# Patient Record
Sex: MALE | Race: BLACK OR AFRICAN AMERICAN | NOT HISPANIC OR LATINO
[De-identification: names, ages, dates, MRNs, and addresses within clinical notes are randomized per-mention and may not be internally consistent; named-entity substitution may affect disease eponyms.]

---

## 2018-01-03 PROBLEM — Z00.00 ENCOUNTER FOR PREVENTIVE HEALTH EXAMINATION: Status: ACTIVE | Noted: 2018-01-03

## 2019-06-24 ENCOUNTER — RX RENEWAL (OUTPATIENT)
Age: 54
End: 2019-06-24

## 2019-06-24 RX ORDER — PEN NEEDLE, DIABETIC 29 G X1/2"
32G X 4 MM NEEDLE, DISPOSABLE MISCELLANEOUS
Qty: 3 | Refills: 1 | Status: ACTIVE | COMMUNITY
Start: 2019-06-24 | End: 1900-01-01

## 2019-06-26 ENCOUNTER — RX RENEWAL (OUTPATIENT)
Age: 54
End: 2019-06-26

## 2019-06-27 ENCOUNTER — MEDICATION RENEWAL (OUTPATIENT)
Age: 54
End: 2019-06-27

## 2019-07-10 ENCOUNTER — RX RENEWAL (OUTPATIENT)
Age: 54
End: 2019-07-10

## 2019-07-11 ENCOUNTER — APPOINTMENT (OUTPATIENT)
Dept: ENDOCRINOLOGY | Facility: CLINIC | Age: 54
End: 2019-07-11
Payer: MEDICAID

## 2019-07-11 VITALS
OXYGEN SATURATION: 96 % | HEART RATE: 96 BPM | BODY MASS INDEX: 39.17 KG/M2 | SYSTOLIC BLOOD PRESSURE: 120 MMHG | WEIGHT: 315 LBS | HEIGHT: 75 IN | DIASTOLIC BLOOD PRESSURE: 80 MMHG | TEMPERATURE: 98.6 F

## 2019-07-11 DIAGNOSIS — Z56.0 UNEMPLOYMENT, UNSPECIFIED: ICD-10-CM

## 2019-07-11 DIAGNOSIS — E66.9 OBESITY, UNSPECIFIED: ICD-10-CM

## 2019-07-11 DIAGNOSIS — Z78.9 OTHER SPECIFIED HEALTH STATUS: ICD-10-CM

## 2019-07-11 DIAGNOSIS — E11.65 TYPE 2 DIABETES MELLITUS WITH HYPERGLYCEMIA: ICD-10-CM

## 2019-07-11 DIAGNOSIS — N52.9 MALE ERECTILE DYSFUNCTION, UNSPECIFIED: ICD-10-CM

## 2019-07-11 DIAGNOSIS — R79.89 OTHER SPECIFIED ABNORMAL FINDINGS OF BLOOD CHEMISTRY: ICD-10-CM

## 2019-07-11 DIAGNOSIS — L97.529 NON-PRESSURE CHRONIC ULCER OF OTHER PART OF LEFT FOOT WITH UNSPECIFIED SEVERITY: ICD-10-CM

## 2019-07-11 DIAGNOSIS — E55.9 VITAMIN D DEFICIENCY, UNSPECIFIED: ICD-10-CM

## 2019-07-11 PROCEDURE — 83036 HEMOGLOBIN GLYCOSYLATED A1C: CPT | Mod: QW

## 2019-07-11 PROCEDURE — 36415 COLL VENOUS BLD VENIPUNCTURE: CPT

## 2019-07-11 PROCEDURE — 82962 GLUCOSE BLOOD TEST: CPT

## 2019-07-11 PROCEDURE — 99215 OFFICE O/P EST HI 40 MIN: CPT | Mod: 25

## 2019-07-11 RX ORDER — SITAGLIPTIN 100 MG/1
100 TABLET, FILM COATED ORAL
Refills: 0 | Status: ACTIVE | COMMUNITY

## 2019-07-11 RX ORDER — REPAGLINIDE 2 MG/1
2 TABLET ORAL
Qty: 360 | Refills: 0 | Status: ACTIVE | COMMUNITY
Start: 2019-06-26 | End: 1900-01-01

## 2019-07-11 SDOH — ECONOMIC STABILITY - INCOME SECURITY: UNEMPLOYMENT, UNSPECIFIED: Z56.0

## 2019-07-11 NOTE — HISTORY OF PRESENT ILLNESS
[FreeTextEntry1] : Mr. Mead  returns today in follow up with regard to a history of type 2 diabetes mellitus.  Diabetes has been present for eight years . There is no known history of retinopathy, nephropathy. He too denies any history of neuropathy to fingers and feet. .  Last Hba1c returned at 9.6 % and  POCT bg 263 mg/dl .  Current dm medication include  Januvia 100 mg po one tablet daily , Repaglinide 2 mg po daily  two tablet twice  a day in am and prior to dinner and  glimepiride 2 mg  two tablets twice  a day , basaglar 10 units-had stopped for some time twice  a day  .  HGM of late has not been tested.*/ . There was one episode this morning of hyperglycemia , patient felt light headed . Patient had laser eye surgery  three week ago. He also had left toe ulcer since fall last year. In March x ray  done  and was negative for osteomyelitis. He denies any chest pain, sob, or ophthalmologic complaints. He too . He is up to date with his ophthalmologic visit.\par He is willing to try kelly.\par \par laser surgery  three week ago. Patient had left toe ulcer  since fall last year. In March x ray  done  and was negative for osteomyelitis.

## 2019-07-11 NOTE — PHYSICAL EXAM
[Alert] : alert [No Acute Distress] : no acute distress [Well Nourished] : well nourished [Well Developed] : well developed [Normal Sclera/Conjunctiva] : normal sclera/conjunctiva [EOMI] : extra ocular movement intact [No Proptosis] : no proptosis [Normal Oropharynx] : the oropharynx was normal [No Thyroid Nodules] : there were no palpable thyroid nodules [Thyroid Not Enlarged] : the thyroid was not enlarged [No Accessory Muscle Use] : no accessory muscle use [No Respiratory Distress] : no respiratory distress [Clear to Auscultation] : lungs were clear to auscultation bilaterally [Normal Rate] : heart rate was normal  [Normal S1, S2] : normal S1 and S2 [Regular Rhythm] : with a regular rhythm [Pedal Pulses Normal] : the pedal pulses are present [Normal Bowel Sounds] : normal bowel sounds [No Edema] : there was no peripheral edema [Soft] : abdomen soft [Not Tender] : non-tender [Not Distended] : not distended [Post Cervical Nodes] : posterior cervical nodes [Anterior Cervical Nodes] : anterior cervical nodes [Axillary Nodes] : axillary nodes [Spine Straight] : spine straight [Normal] : normal and non tender [No Spinal Tenderness] : no spinal tenderness [No Stigmata of Cushings Syndrome] : no stigmata of cushings syndrome [Normal Strength/Tone] : muscle strength and tone were normal [No Rash] : no rash [Normal Gait] : normal gait [Normal Reflexes] : deep tendon reflexes were 2+ and symmetric [No Tremors] : no tremors [Oriented x3] : oriented to person, place, and time [Acanthosis Nigricans] : no acanthosis nigricans

## 2019-07-16 LAB
GLUCOSE BLDC GLUCOMTR-MCNC: 263
HBA1C MFR BLD HPLC: 9.6

## 2019-07-20 RX ORDER — LANCETS
EACH MISCELLANEOUS
Qty: 4 | Refills: 3 | Status: ACTIVE | COMMUNITY
Start: 2019-07-20 | End: 1900-01-01

## 2019-08-14 ENCOUNTER — APPOINTMENT (OUTPATIENT)
Dept: ENDOCRINOLOGY | Facility: CLINIC | Age: 54
End: 2019-08-14

## 2019-08-15 LAB
25(OH)D3 SERPL-MCNC: 25.3 NG/ML
ALBUMIN SERPL ELPH-MCNC: 3.1 G/DL
ALP BLD-CCNC: 87 U/L
ALT SERPL-CCNC: 15 U/L
ANION GAP SERPL CALC-SCNC: 11 MMOL/L
AST SERPL-CCNC: 17 U/L
BASOPHILS # BLD AUTO: 0.04 K/UL
BASOPHILS NFR BLD AUTO: 0.4 %
BILIRUB SERPL-MCNC: 0.3 MG/DL
BUN SERPL-MCNC: 26 MG/DL
CALCIUM SERPL-MCNC: 9.1 MG/DL
CHLORIDE SERPL-SCNC: 103 MMOL/L
CO2 SERPL-SCNC: 24 MMOL/L
CREAT SERPL-MCNC: 1.85 MG/DL
CREAT SPEC-SCNC: 214 MG/DL
EOSINOPHIL # BLD AUTO: 0.14 K/UL
EOSINOPHIL NFR BLD AUTO: 1.5 %
ESTIMATED AVERAGE GLUCOSE: 209 MG/DL
FRUCTOSAMINE SERPL-MCNC: 306 UMOL/L
GLUCOSE SERPL-MCNC: 302 MG/DL
GLYCOMARK.: 2.9 UG/ML
HBA1C MFR BLD HPLC: 8.9 %
HCT VFR BLD CALC: 25.5 %
HDLC SERPL-MCNC: 36 MG/DL
HGB BLD-MCNC: 8.5 G/DL
IMM GRANULOCYTES NFR BLD AUTO: 0.3 %
LDLC SERPL DIRECT ASSAY-MCNC: 97 MG/DL
LYMPHOCYTES # BLD AUTO: 1.86 K/UL
LYMPHOCYTES NFR BLD AUTO: 19.4 %
MAN DIFF?: NORMAL
MCHC RBC-ENTMCNC: 26.7 PG
MCHC RBC-ENTMCNC: 33.3 GM/DL
MCV RBC AUTO: 80.2 FL
MICROALBUMIN 24H UR DL<=1MG/L-MCNC: 45.1 MG/DL
MICROALBUMIN/CREAT 24H UR-RTO: 211 MG/G
MONOCYTES # BLD AUTO: 0.48 K/UL
MONOCYTES NFR BLD AUTO: 5 %
NEUTROPHILS # BLD AUTO: 7.05 K/UL
NEUTROPHILS NFR BLD AUTO: 73.4 %
PLATELET # BLD AUTO: 250 K/UL
POTASSIUM SERPL-SCNC: 4.3 MMOL/L
PROT SERPL-MCNC: 8.3 G/DL
PSA FREE FLD-MCNC: 17 %
PSA FREE SERPL-MCNC: 0.43 NG/ML
PSA SERPL-MCNC: 2.54 NG/ML
RBC # BLD: 3.18 M/UL
RBC # FLD: 14.3 %
SODIUM SERPL-SCNC: 138 MMOL/L
T4 FREE SERPL-MCNC: 1 NG/DL
TESTOST BND SERPL-MCNC: 5 PG/ML
TESTOST SERPL-MCNC: 296.6 NG/DL
TRIGL SERPL-MCNC: 63 MG/DL
TSH SERPL-ACNC: 2.75 UIU/ML
WBC # FLD AUTO: 9.6 K/UL

## 2019-08-19 ENCOUNTER — RX RENEWAL (OUTPATIENT)
Age: 54
End: 2019-08-19

## 2019-11-29 ENCOUNTER — RX RENEWAL (OUTPATIENT)
Age: 54
End: 2019-11-29

## 2019-11-29 RX ORDER — INSULIN GLARGINE 100 [IU]/ML
100 INJECTION, SOLUTION SUBCUTANEOUS
Qty: 2 | Refills: 3 | Status: ACTIVE | COMMUNITY
Start: 2019-11-29 | End: 1900-01-01

## 2019-12-24 ENCOUNTER — RX RENEWAL (OUTPATIENT)
Age: 54
End: 2019-12-24

## 2019-12-24 RX ORDER — ERGOCALCIFEROL 1.25 MG/1
1.25 MG CAPSULE, LIQUID FILLED ORAL
Qty: 12 | Refills: 1 | Status: ACTIVE | COMMUNITY
Start: 2019-08-19 | End: 1900-01-01

## 2020-02-26 ENCOUNTER — RX RENEWAL (OUTPATIENT)
Age: 55
End: 2020-02-26

## 2020-02-26 RX ORDER — GLIMEPIRIDE 2 MG/1
2 TABLET ORAL
Qty: 360 | Refills: 1 | Status: ACTIVE | COMMUNITY
Start: 2020-02-26 | End: 1900-01-01

## 2020-05-08 ENCOUNTER — EMERGENCY (EMERGENCY)
Facility: HOSPITAL | Age: 55
LOS: 1 days | Discharge: HOME | End: 2020-05-08
Attending: EMERGENCY MEDICINE
Payer: SELF-PAY

## 2020-05-08 VITALS
DIASTOLIC BLOOD PRESSURE: 87 MMHG | OXYGEN SATURATION: 98 % | SYSTOLIC BLOOD PRESSURE: 172 MMHG | RESPIRATION RATE: 20 BRPM | HEART RATE: 87 BPM

## 2020-05-08 VITALS
DIASTOLIC BLOOD PRESSURE: 76 MMHG | SYSTOLIC BLOOD PRESSURE: 186 MMHG | HEART RATE: 83 BPM | OXYGEN SATURATION: 100 % | TEMPERATURE: 98 F | RESPIRATION RATE: 19 BRPM

## 2020-05-08 LAB
ALBUMIN SERPL ELPH-MCNC: 2.9 G/DL — LOW (ref 3.3–5)
ALBUMIN SERPL ELPH-MCNC: 2.9 G/DL — LOW (ref 3.3–5)
ALP SERPL-CCNC: 82 U/L — SIGNIFICANT CHANGE UP (ref 40–120)
ALP SERPL-CCNC: 83 U/L — SIGNIFICANT CHANGE UP (ref 40–120)
ALT FLD-CCNC: 11 U/L — SIGNIFICANT CHANGE UP (ref 10–45)
ALT FLD-CCNC: 11 U/L — SIGNIFICANT CHANGE UP (ref 10–45)
ANION GAP SERPL CALC-SCNC: 10 MMOL/L — SIGNIFICANT CHANGE UP (ref 5–17)
ANION GAP SERPL CALC-SCNC: 13 MMOL/L — SIGNIFICANT CHANGE UP (ref 5–17)
APPEARANCE UR: CLEAR — SIGNIFICANT CHANGE UP
APTT BLD: 32.1 SEC — SIGNIFICANT CHANGE UP (ref 27.5–36.3)
AST SERPL-CCNC: 16 U/L — SIGNIFICANT CHANGE UP (ref 10–40)
AST SERPL-CCNC: 18 U/L — SIGNIFICANT CHANGE UP (ref 10–40)
BASOPHILS # BLD AUTO: 0.02 K/UL — SIGNIFICANT CHANGE UP (ref 0–0.2)
BASOPHILS # BLD AUTO: 0.03 K/UL — SIGNIFICANT CHANGE UP (ref 0–0.2)
BASOPHILS NFR BLD AUTO: 0.2 % — SIGNIFICANT CHANGE UP (ref 0–2)
BASOPHILS NFR BLD AUTO: 0.3 % — SIGNIFICANT CHANGE UP (ref 0–2)
BILIRUB SERPL-MCNC: 0.3 MG/DL — SIGNIFICANT CHANGE UP (ref 0.2–1.2)
BILIRUB SERPL-MCNC: 0.3 MG/DL — SIGNIFICANT CHANGE UP (ref 0.2–1.2)
BILIRUB UR-MCNC: NEGATIVE — SIGNIFICANT CHANGE UP
BLD GP AB SCN SERPL QL: NEGATIVE — SIGNIFICANT CHANGE UP
BUN SERPL-MCNC: 21 MG/DL — SIGNIFICANT CHANGE UP (ref 7–23)
BUN SERPL-MCNC: 23 MG/DL — SIGNIFICANT CHANGE UP (ref 7–23)
CALCIUM SERPL-MCNC: 9 MG/DL — SIGNIFICANT CHANGE UP (ref 8.4–10.5)
CALCIUM SERPL-MCNC: 9.2 MG/DL — SIGNIFICANT CHANGE UP (ref 8.4–10.5)
CHLORIDE SERPL-SCNC: 102 MMOL/L — SIGNIFICANT CHANGE UP (ref 96–108)
CHLORIDE SERPL-SCNC: 103 MMOL/L — SIGNIFICANT CHANGE UP (ref 96–108)
CO2 SERPL-SCNC: 23 MMOL/L — SIGNIFICANT CHANGE UP (ref 22–31)
CO2 SERPL-SCNC: 23 MMOL/L — SIGNIFICANT CHANGE UP (ref 22–31)
COLOR SPEC: YELLOW — SIGNIFICANT CHANGE UP
CREAT SERPL-MCNC: 1.3 MG/DL — SIGNIFICANT CHANGE UP (ref 0.5–1.3)
CREAT SERPL-MCNC: 1.49 MG/DL — HIGH (ref 0.5–1.3)
DIFF PNL FLD: ABNORMAL
EOSINOPHIL # BLD AUTO: 0.11 K/UL — SIGNIFICANT CHANGE UP (ref 0–0.5)
EOSINOPHIL # BLD AUTO: 0.33 K/UL — SIGNIFICANT CHANGE UP (ref 0–0.5)
EOSINOPHIL NFR BLD AUTO: 1 % — SIGNIFICANT CHANGE UP (ref 0–6)
EOSINOPHIL NFR BLD AUTO: 3.3 % — SIGNIFICANT CHANGE UP (ref 0–6)
GLUCOSE SERPL-MCNC: 138 MG/DL — HIGH (ref 70–99)
GLUCOSE SERPL-MCNC: 177 MG/DL — HIGH (ref 70–99)
GLUCOSE UR QL: NEGATIVE — SIGNIFICANT CHANGE UP
HCT VFR BLD CALC: 24.2 % — LOW (ref 39–50)
HCT VFR BLD CALC: 24.3 % — LOW (ref 39–50)
HGB BLD-MCNC: 8 G/DL — LOW (ref 13–17)
HGB BLD-MCNC: 8.1 G/DL — LOW (ref 13–17)
IMM GRANULOCYTES NFR BLD AUTO: 0.4 % — SIGNIFICANT CHANGE UP (ref 0–1.5)
IMM GRANULOCYTES NFR BLD AUTO: 0.5 % — SIGNIFICANT CHANGE UP (ref 0–1.5)
INR BLD: 1.2 RATIO — HIGH (ref 0.88–1.16)
KETONES UR-MCNC: NEGATIVE — SIGNIFICANT CHANGE UP
LEUKOCYTE ESTERASE UR-ACNC: ABNORMAL
LYMPHOCYTES # BLD AUTO: 1.47 K/UL — SIGNIFICANT CHANGE UP (ref 1–3.3)
LYMPHOCYTES # BLD AUTO: 13.8 % — SIGNIFICANT CHANGE UP (ref 13–44)
LYMPHOCYTES # BLD AUTO: 3.71 K/UL — HIGH (ref 1–3.3)
LYMPHOCYTES # BLD AUTO: 37 % — SIGNIFICANT CHANGE UP (ref 13–44)
MCHC RBC-ENTMCNC: 25.4 PG — LOW (ref 27–34)
MCHC RBC-ENTMCNC: 25.9 PG — LOW (ref 27–34)
MCHC RBC-ENTMCNC: 33.1 GM/DL — SIGNIFICANT CHANGE UP (ref 32–36)
MCHC RBC-ENTMCNC: 33.3 GM/DL — SIGNIFICANT CHANGE UP (ref 32–36)
MCV RBC AUTO: 76.8 FL — LOW (ref 80–100)
MCV RBC AUTO: 77.6 FL — LOW (ref 80–100)
MONOCYTES # BLD AUTO: 0.54 K/UL — SIGNIFICANT CHANGE UP (ref 0–0.9)
MONOCYTES # BLD AUTO: 0.62 K/UL — SIGNIFICANT CHANGE UP (ref 0–0.9)
MONOCYTES NFR BLD AUTO: 5.1 % — SIGNIFICANT CHANGE UP (ref 2–14)
MONOCYTES NFR BLD AUTO: 6.2 % — SIGNIFICANT CHANGE UP (ref 2–14)
NEUTROPHILS # BLD AUTO: 5.28 K/UL — SIGNIFICANT CHANGE UP (ref 1.8–7.4)
NEUTROPHILS # BLD AUTO: 8.47 K/UL — HIGH (ref 1.8–7.4)
NEUTROPHILS NFR BLD AUTO: 52.7 % — SIGNIFICANT CHANGE UP (ref 43–77)
NEUTROPHILS NFR BLD AUTO: 79.5 % — HIGH (ref 43–77)
NITRITE UR-MCNC: NEGATIVE — SIGNIFICANT CHANGE UP
NRBC # BLD: 0 /100 WBCS — SIGNIFICANT CHANGE UP (ref 0–0)
NRBC # BLD: 0 /100 WBCS — SIGNIFICANT CHANGE UP (ref 0–0)
PH UR: 6.5 — SIGNIFICANT CHANGE UP (ref 5–8)
PLATELET # BLD AUTO: 257 K/UL — SIGNIFICANT CHANGE UP (ref 150–400)
PLATELET # BLD AUTO: 259 K/UL — SIGNIFICANT CHANGE UP (ref 150–400)
POTASSIUM SERPL-MCNC: 3.5 MMOL/L — SIGNIFICANT CHANGE UP (ref 3.5–5.3)
POTASSIUM SERPL-MCNC: 3.6 MMOL/L — SIGNIFICANT CHANGE UP (ref 3.5–5.3)
POTASSIUM SERPL-SCNC: 3.5 MMOL/L — SIGNIFICANT CHANGE UP (ref 3.5–5.3)
POTASSIUM SERPL-SCNC: 3.6 MMOL/L — SIGNIFICANT CHANGE UP (ref 3.5–5.3)
PROT SERPL-MCNC: 8.7 G/DL — HIGH (ref 6–8.3)
PROT SERPL-MCNC: 8.9 G/DL — HIGH (ref 6–8.3)
PROT UR-MCNC: 100 — SIGNIFICANT CHANGE UP
PROTHROM AB SERPL-ACNC: 13.8 SEC — HIGH (ref 10–12.9)
RBC # BLD: 3.13 M/UL — LOW (ref 4.2–5.8)
RBC # BLD: 3.15 M/UL — LOW (ref 4.2–5.8)
RBC # FLD: 15.4 % — HIGH (ref 10.3–14.5)
RBC # FLD: 15.7 % — HIGH (ref 10.3–14.5)
RH IG SCN BLD-IMP: NEGATIVE — SIGNIFICANT CHANGE UP
SARS-COV-2 RNA SPEC QL NAA+PROBE: SIGNIFICANT CHANGE UP
SARS-COV-2 RNA SPEC QL NAA+PROBE: SIGNIFICANT CHANGE UP
SODIUM SERPL-SCNC: 135 MMOL/L — SIGNIFICANT CHANGE UP (ref 135–145)
SODIUM SERPL-SCNC: 139 MMOL/L — SIGNIFICANT CHANGE UP (ref 135–145)
SP GR SPEC: 1.01 — SIGNIFICANT CHANGE UP (ref 1.01–1.02)
UROBILINOGEN FLD QL: NEGATIVE — SIGNIFICANT CHANGE UP
WBC # BLD: 10.02 K/UL — SIGNIFICANT CHANGE UP (ref 3.8–10.5)
WBC # BLD: 10.65 K/UL — HIGH (ref 3.8–10.5)
WBC # FLD AUTO: 10.02 K/UL — SIGNIFICANT CHANGE UP (ref 3.8–10.5)
WBC # FLD AUTO: 10.65 K/UL — HIGH (ref 3.8–10.5)

## 2020-05-08 PROCEDURE — 93010 ELECTROCARDIOGRAM REPORT: CPT

## 2020-05-08 PROCEDURE — 72125 CT NECK SPINE W/O DYE: CPT | Mod: 26

## 2020-05-08 PROCEDURE — 72141 MRI NECK SPINE W/O DYE: CPT | Mod: 26

## 2020-05-08 PROCEDURE — 70450 CT HEAD/BRAIN W/O DYE: CPT | Mod: 26

## 2020-05-08 PROCEDURE — 71260 CT THORAX DX C+: CPT | Mod: 26

## 2020-05-08 PROCEDURE — 70551 MRI BRAIN STEM W/O DYE: CPT | Mod: 26

## 2020-05-08 PROCEDURE — 99218: CPT

## 2020-05-08 PROCEDURE — 74177 CT ABD & PELVIS W/CONTRAST: CPT | Mod: 26

## 2020-05-08 PROCEDURE — 70498 CT ANGIOGRAPHY NECK: CPT | Mod: 26

## 2020-05-08 PROCEDURE — 70549 MR ANGIOGRAPH NECK W/O&W/DYE: CPT | Mod: 26

## 2020-05-08 PROCEDURE — 70544 MR ANGIOGRAPHY HEAD W/O DYE: CPT | Mod: 26,59

## 2020-05-08 RX ORDER — GLUCAGON INJECTION, SOLUTION 0.5 MG/.1ML
1 INJECTION, SOLUTION SUBCUTANEOUS ONCE
Refills: 0 | Status: DISCONTINUED | OUTPATIENT
Start: 2020-05-08 | End: 2020-05-12

## 2020-05-08 RX ORDER — DEXTROSE 50 % IN WATER 50 %
12.5 SYRINGE (ML) INTRAVENOUS ONCE
Refills: 0 | Status: DISCONTINUED | OUTPATIENT
Start: 2020-05-08 | End: 2020-05-12

## 2020-05-08 RX ORDER — CEFTRIAXONE 500 MG/1
1000 INJECTION, POWDER, FOR SOLUTION INTRAMUSCULAR; INTRAVENOUS ONCE
Refills: 0 | Status: COMPLETED | OUTPATIENT
Start: 2020-05-08 | End: 2020-05-08

## 2020-05-08 RX ORDER — INSULIN LISPRO 100/ML
VIAL (ML) SUBCUTANEOUS
Refills: 0 | Status: DISCONTINUED | OUTPATIENT
Start: 2020-05-08 | End: 2020-05-12

## 2020-05-08 RX ORDER — LABETALOL HCL 100 MG
10 TABLET ORAL ONCE
Refills: 0 | Status: COMPLETED | OUTPATIENT
Start: 2020-05-08 | End: 2020-05-08

## 2020-05-08 RX ORDER — DEXTROSE 50 % IN WATER 50 %
15 SYRINGE (ML) INTRAVENOUS ONCE
Refills: 0 | Status: DISCONTINUED | OUTPATIENT
Start: 2020-05-08 | End: 2020-05-12

## 2020-05-08 RX ORDER — INSULIN GLARGINE 100 [IU]/ML
15 INJECTION, SOLUTION SUBCUTANEOUS AT BEDTIME
Refills: 0 | Status: DISCONTINUED | OUTPATIENT
Start: 2020-05-08 | End: 2020-05-08

## 2020-05-08 RX ORDER — DEXTROSE 50 % IN WATER 50 %
25 SYRINGE (ML) INTRAVENOUS ONCE
Refills: 0 | Status: DISCONTINUED | OUTPATIENT
Start: 2020-05-08 | End: 2020-05-12

## 2020-05-08 RX ORDER — ACETAMINOPHEN 500 MG
1000 TABLET ORAL ONCE
Refills: 0 | Status: COMPLETED | OUTPATIENT
Start: 2020-05-08 | End: 2020-05-08

## 2020-05-08 RX ORDER — CEPHALEXIN 500 MG
1 CAPSULE ORAL
Qty: 14 | Refills: 0
Start: 2020-05-08 | End: 2020-05-14

## 2020-05-08 RX ORDER — INSULIN LISPRO 100/ML
VIAL (ML) SUBCUTANEOUS AT BEDTIME
Refills: 0 | Status: DISCONTINUED | OUTPATIENT
Start: 2020-05-08 | End: 2020-05-12

## 2020-05-08 RX ORDER — ACETAMINOPHEN 500 MG
975 TABLET ORAL EVERY 6 HOURS
Refills: 0 | Status: DISCONTINUED | OUTPATIENT
Start: 2020-05-08 | End: 2020-05-12

## 2020-05-08 RX ORDER — SODIUM CHLORIDE 9 MG/ML
1000 INJECTION INTRAMUSCULAR; INTRAVENOUS; SUBCUTANEOUS ONCE
Refills: 0 | Status: COMPLETED | OUTPATIENT
Start: 2020-05-08 | End: 2020-05-08

## 2020-05-08 RX ORDER — ACETAMINOPHEN 500 MG
975 TABLET ORAL ONCE
Refills: 0 | Status: DISCONTINUED | OUTPATIENT
Start: 2020-05-08 | End: 2020-05-08

## 2020-05-08 RX ORDER — SODIUM CHLORIDE 9 MG/ML
1000 INJECTION, SOLUTION INTRAVENOUS
Refills: 0 | Status: DISCONTINUED | OUTPATIENT
Start: 2020-05-08 | End: 2020-05-12

## 2020-05-08 RX ORDER — TETANUS TOXOID, REDUCED DIPHTHERIA TOXOID AND ACELLULAR PERTUSSIS VACCINE, ADSORBED 5; 2.5; 8; 8; 2.5 [IU]/.5ML; [IU]/.5ML; UG/.5ML; UG/.5ML; UG/.5ML
0.5 SUSPENSION INTRAMUSCULAR ONCE
Refills: 0 | Status: COMPLETED | OUTPATIENT
Start: 2020-05-08 | End: 2020-05-08

## 2020-05-08 RX ORDER — SODIUM CHLORIDE 9 MG/ML
3 INJECTION INTRAMUSCULAR; INTRAVENOUS; SUBCUTANEOUS EVERY 8 HOURS
Refills: 0 | Status: DISCONTINUED | OUTPATIENT
Start: 2020-05-08 | End: 2020-05-12

## 2020-05-08 RX ORDER — MORPHINE SULFATE 50 MG/1
4 CAPSULE, EXTENDED RELEASE ORAL ONCE
Refills: 0 | Status: DISCONTINUED | OUTPATIENT
Start: 2020-05-08 | End: 2020-05-08

## 2020-05-08 RX ADMIN — Medication 975 MILLIGRAM(S): at 22:08

## 2020-05-08 RX ADMIN — MORPHINE SULFATE 4 MILLIGRAM(S): 50 CAPSULE, EXTENDED RELEASE ORAL at 06:14

## 2020-05-08 RX ADMIN — Medication 10 MILLIGRAM(S): at 16:17

## 2020-05-08 RX ADMIN — Medication 1: at 22:43

## 2020-05-08 RX ADMIN — Medication 1000 MILLIGRAM(S): at 04:34

## 2020-05-08 RX ADMIN — SODIUM CHLORIDE 1000 MILLILITER(S): 9 INJECTION INTRAMUSCULAR; INTRAVENOUS; SUBCUTANEOUS at 04:33

## 2020-05-08 RX ADMIN — TETANUS TOXOID, REDUCED DIPHTHERIA TOXOID AND ACELLULAR PERTUSSIS VACCINE, ADSORBED 0.5 MILLILITER(S): 5; 2.5; 8; 8; 2.5 SUSPENSION INTRAMUSCULAR at 06:33

## 2020-05-08 RX ADMIN — SODIUM CHLORIDE 3 MILLILITER(S): 9 INJECTION INTRAMUSCULAR; INTRAVENOUS; SUBCUTANEOUS at 21:57

## 2020-05-08 RX ADMIN — CEFTRIAXONE 100 MILLIGRAM(S): 500 INJECTION, POWDER, FOR SOLUTION INTRAMUSCULAR; INTRAVENOUS at 08:34

## 2020-05-08 RX ADMIN — Medication 400 MILLIGRAM(S): at 03:49

## 2020-05-08 NOTE — ED ADULT NURSE NOTE - NSIMPLEMENTINTERV_GEN_ALL_ED
Implemented All Fall with Harm Risk Interventions:  [place] to call system. Call bell, personal items and telephone within reach. Instruct patient to call for assistance. Room bathroom lighting operational. Non-slip footwear when patient is off stretcher. Physically safe environment: no spills, clutter or unnecessary equipment. Stretcher in lowest position, wheels locked, appropriate side rails in place. Provide visual cue, wrist band, yellow gown, etc. Monitor gait and stability. Monitor for mental status changes and reorient to person, place, and time. Review medications for side effects contributing to fall risk. Reinforce activity limits and safety measures with patient and family. Provide visual clues: red socks.

## 2020-05-08 NOTE — ED CDU PROVIDER INITIAL DAY NOTE - DETAILS
- frequent re-eval  - vitals q 4hrs  - neurochecks q 4hrs  - pain control  - MRI/MRA head and neck  - neurosx following  - case discussed with attending Dr. Stacy and Dr. De La Fuente (who got signout from Dr. Guerra)

## 2020-05-08 NOTE — CHART NOTE - NSCHARTNOTEFT_GEN_A_CORE
MRI brain, C spine and MRA neck reviewed.  Patient has no Vertebral artery injury. No indication for any anticoagulation or anti platelet agent.  Patient does have some ligamentous injury involving, the ALL, Alar ligaments and interspinous ligaments. Reviewed final read with Radiology.    - Would recommend C collar at all times. Can call Carson Paula at [phone] as outpatient to have collar fitted (Would discharge with Yolanda NICOLAS). Please have patient follow up with Dr. Radford in 4 weeks in clinic   - No neurosurgical contraindication to discharge at this point

## 2020-05-08 NOTE — ED CDU PROVIDER DISPOSITION NOTE - PATIENT PORTAL LINK FT
You can access the FollowMyHealth Patient Portal offered by [facility] by registering at the following website: [email or web address]. By joining [employer]’s FollowMyHealth portal, you will also be able to view your health information using other applications (apps) compatible with our system.
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
## 2020-05-08 NOTE — ED PROVIDER NOTE - PHYSICAL EXAMINATION
Gen: NAD, non-toxic, conversational  Eyes: PERRLA, EOMI   HENT: Normocephalic, atraumatic. External ears normal, no rhinorrhea, moist mucous membranes.   CV: RRR, no M/R/G  Resp: CTAB, non-labored, speaking without difficulty on room air  Abd: soft, obese, non tender, non rigid, no guarding or rebound tenderness  Skin: abrasion to the left elbow, abrasion to the forehead with dried blood, no active bleeding, chronic vascular changes to lower extremities worse on the left  Neuro: AOx3, speech is fluent and appropriate, sensory intact, normal rectal tone  Msk: moving all 4 extremities, no midline cervical spine tenderness, no midline tenderness throughout the rest of the thoracic/lumbar spine,
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
## 2020-05-08 NOTE — ED CDU PROVIDER INITIAL DAY NOTE - PROGRESS NOTE DETAILS
CDU NOTE DAVE UGARTE: Pt resting comfortably, had 4-5/10 neck pain and 2/10 RUE, pt given tylenol. NAD, VSS. neuro exam was normal aside from ttp c-spine. MRI/A head negative, MRA N negative. MRI N shows marked paravertebral soft tissue swelling C2-6, disruption of anterior longitudinal ligament at C3-4 and C4-5, small avulsed fx of C3, alar and interspinous ligamentous injuries, disc bulge/osteophytes/facet degenerative changes resulting in multilevel mild to moderate spinal canal stenosis throughout the cervical spine. Spoke with neurosx re: MRI/A results, pt stable for discharge with collar and outpt f/u. case and plan discussed with Dr. De La Fuente; pt stable for discharge with outpt f/u.

## 2020-05-08 NOTE — ED CDU PROVIDER DISPOSITION NOTE - NSFOLLOWUPINSTRUCTIONS_ED_ALL_ED_FT
1. Rest and KEEP CERVICAL COLLAR ON AT ALL TIMES.  Take Tylenol 650-1000mg every 6-8 hrs as needed for pain.   2. Take keflex for urine infection as prescribed.   3. Follow up with PMD within 48-72 hrs. Your blood pressure was high, this should be monitored and evaluated. Start a low sodium diet. You had some abnormal blood and urine tests showing increased Creatinine, anemia, blood in urine; repeat blood work and urine within 1 week.  You also had an abnormal chest CT, recommend repeat CT in 6 months.   4. Follow up with Carson Paula at [phone] as outpatient to have collar fitted. You must wear collar at all times.    5. Follow up with Neurosurgeon Dr. Radford in 4 weeks in clinic. call [phone] for an appointment.   6. Any worsening pain, swelling, weakness, numbness return to ED.
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
## 2020-05-08 NOTE — ED PROVIDER NOTE - NS ED ROS FT
General: No fevers / chills  HENT: No head trauma, ear pain, runny nose, or sore throat  Eyes: No visual changes  CP: No chest pain, palpitations, or lightheadedness  Resp: No shortness of breath, no cough  GI: No abdominal pain, diarrhea, constipation, nausea, or vomiting  : No urinary fz, dysuria, or hematuria  Neuro: No numbness, tingling, or weakness  Msk: +neck pain

## 2020-05-08 NOTE — ED CDU PROVIDER DISPOSITION NOTE - CARE PROVIDER_API CALL
Baltazar Radford (DO)  Neurosurgery  [address] [facility], [facility] [address]  [place] [address]  Phone: [phone]  Fax: [phone]  Follow Up Time:

## 2020-05-08 NOTE — ED ADULT NURSE REASSESSMENT NOTE - COMFORT CARE
warm blanket provided
repositioned/plan of care explained/darkened lights/meal provided/warm blanket provided/po fluids offered
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
## 2020-05-09 PROCEDURE — 87086 URINE CULTURE/COLONY COUNT: CPT

## 2020-05-09 PROCEDURE — G0378: CPT

## 2020-05-09 PROCEDURE — 81001 URINALYSIS AUTO W/SCOPE: CPT

## 2020-05-09 PROCEDURE — 72125 CT NECK SPINE W/O DYE: CPT

## 2020-05-09 PROCEDURE — 86900 BLOOD TYPING SEROLOGIC ABO: CPT

## 2020-05-09 PROCEDURE — 96375 TX/PRO/DX INJ NEW DRUG ADDON: CPT

## 2020-05-09 PROCEDURE — 70551 MRI BRAIN STEM W/O DYE: CPT

## 2020-05-09 PROCEDURE — 85027 COMPLETE CBC AUTOMATED: CPT

## 2020-05-09 PROCEDURE — 85730 THROMBOPLASTIN TIME PARTIAL: CPT

## 2020-05-09 PROCEDURE — 70549 MR ANGIOGRAPH NECK W/O&W/DYE: CPT

## 2020-05-09 PROCEDURE — 96365 THER/PROPH/DIAG IV INF INIT: CPT | Mod: XU

## 2020-05-09 PROCEDURE — 93005 ELECTROCARDIOGRAM TRACING: CPT

## 2020-05-09 PROCEDURE — 70450 CT HEAD/BRAIN W/O DYE: CPT

## 2020-05-09 PROCEDURE — 90471 IMMUNIZATION ADMIN: CPT

## 2020-05-09 PROCEDURE — 80053 COMPREHEN METABOLIC PANEL: CPT

## 2020-05-09 PROCEDURE — 71260 CT THORAX DX C+: CPT

## 2020-05-09 PROCEDURE — 86901 BLOOD TYPING SEROLOGIC RH(D): CPT

## 2020-05-09 PROCEDURE — 70498 CT ANGIOGRAPHY NECK: CPT

## 2020-05-09 PROCEDURE — 72141 MRI NECK SPINE W/O DYE: CPT

## 2020-05-09 PROCEDURE — 70544 MR ANGIOGRAPHY HEAD W/O DYE: CPT

## 2020-05-09 PROCEDURE — 74177 CT ABD & PELVIS W/CONTRAST: CPT

## 2020-05-09 PROCEDURE — 99285 EMERGENCY DEPT VISIT HI MDM: CPT | Mod: 25

## 2020-05-09 PROCEDURE — 85610 PROTHROMBIN TIME: CPT

## 2020-05-09 PROCEDURE — 82962 GLUCOSE BLOOD TEST: CPT

## 2020-05-09 PROCEDURE — 90715 TDAP VACCINE 7 YRS/> IM: CPT

## 2020-05-09 PROCEDURE — 87635 SARS-COV-2 COVID-19 AMP PRB: CPT

## 2020-05-09 PROCEDURE — 86850 RBC ANTIBODY SCREEN: CPT

## 2020-05-10 LAB
CULTURE RESULTS: NO GROWTH — SIGNIFICANT CHANGE UP
SPECIMEN SOURCE: SIGNIFICANT CHANGE UP

## 2021-03-11 ENCOUNTER — INPATIENT (INPATIENT)
Facility: HOSPITAL | Age: 56
LOS: 7 days | Discharge: HOME | End: 2021-03-19
Attending: INTERNAL MEDICINE | Admitting: INTERNAL MEDICINE
Payer: MEDICAID

## 2021-03-11 VITALS
TEMPERATURE: 100 F | HEART RATE: 93 BPM | SYSTOLIC BLOOD PRESSURE: 176 MMHG | DIASTOLIC BLOOD PRESSURE: 82 MMHG | OXYGEN SATURATION: 94 % | RESPIRATION RATE: 16 BRPM

## 2021-03-11 DIAGNOSIS — I10 ESSENTIAL (PRIMARY) HYPERTENSION: ICD-10-CM

## 2021-03-11 DIAGNOSIS — E11.65 TYPE 2 DIABETES MELLITUS WITH HYPERGLYCEMIA: ICD-10-CM

## 2021-03-11 DIAGNOSIS — R73.9 HYPERGLYCEMIA, UNSPECIFIED: ICD-10-CM

## 2021-03-11 DIAGNOSIS — Z01.818 ENCOUNTER FOR OTHER PREPROCEDURAL EXAMINATION: ICD-10-CM

## 2021-03-11 DIAGNOSIS — E11.621 TYPE 2 DIABETES MELLITUS WITH FOOT ULCER: ICD-10-CM

## 2021-03-11 PROBLEM — E11.9 TYPE 2 DIABETES MELLITUS WITHOUT COMPLICATIONS: Chronic | Status: ACTIVE | Noted: 2020-05-08

## 2021-03-11 LAB
ALBUMIN SERPL ELPH-MCNC: 2.9 G/DL — LOW (ref 3.3–5)
ALP SERPL-CCNC: 162 U/L — HIGH (ref 40–120)
ALT FLD-CCNC: 15 U/L — SIGNIFICANT CHANGE UP (ref 4–41)
ANION GAP SERPL CALC-SCNC: 10 MMOL/L — SIGNIFICANT CHANGE UP (ref 7–14)
APTT BLD: 31.6 SEC — SIGNIFICANT CHANGE UP (ref 27–36.3)
AST SERPL-CCNC: 15 U/L — SIGNIFICANT CHANGE UP (ref 4–40)
B-OH-BUTYR SERPL-SCNC: <0 MMOL/L — SIGNIFICANT CHANGE UP (ref 0–0.4)
BASE EXCESS BLDV CALC-SCNC: 0.6 MMOL/L — SIGNIFICANT CHANGE UP (ref -3–2)
BASOPHILS # BLD AUTO: 0.03 K/UL — SIGNIFICANT CHANGE UP (ref 0–0.2)
BASOPHILS NFR BLD AUTO: 0.2 % — SIGNIFICANT CHANGE UP (ref 0–2)
BILIRUB SERPL-MCNC: 0.3 MG/DL — SIGNIFICANT CHANGE UP (ref 0.2–1.2)
BUN SERPL-MCNC: 34 MG/DL — HIGH (ref 7–23)
CALCIUM SERPL-MCNC: 9.1 MG/DL — SIGNIFICANT CHANGE UP (ref 8.4–10.5)
CHLORIDE SERPL-SCNC: 98 MMOL/L — SIGNIFICANT CHANGE UP (ref 98–107)
CO2 SERPL-SCNC: 25 MMOL/L — SIGNIFICANT CHANGE UP (ref 22–31)
CREAT SERPL-MCNC: 1.53 MG/DL — HIGH (ref 0.5–1.3)
EOSINOPHIL # BLD AUTO: 0.16 K/UL — SIGNIFICANT CHANGE UP (ref 0–0.5)
EOSINOPHIL NFR BLD AUTO: 1.1 % — SIGNIFICANT CHANGE UP (ref 0–6)
GLUCOSE BLDC GLUCOMTR-MCNC: 478 MG/DL — CRITICAL HIGH (ref 70–99)
GLUCOSE SERPL-MCNC: 659 MG/DL — CRITICAL HIGH (ref 70–99)
HCO3 BLDV-SCNC: 24 MMOL/L — SIGNIFICANT CHANGE UP (ref 20–27)
HCT VFR BLD CALC: 24.4 % — LOW (ref 39–50)
HGB BLD-MCNC: 8.2 G/DL — LOW (ref 13–17)
IANC: 12.32 K/UL — HIGH (ref 1.5–8.5)
IMM GRANULOCYTES NFR BLD AUTO: 0.8 % — SIGNIFICANT CHANGE UP (ref 0–1.5)
INR BLD: 1.26 RATIO — HIGH (ref 0.88–1.16)
LYMPHOCYTES # BLD AUTO: 1.24 K/UL — SIGNIFICANT CHANGE UP (ref 1–3.3)
LYMPHOCYTES # BLD AUTO: 8.3 % — LOW (ref 13–44)
MAGNESIUM SERPL-MCNC: 1.8 MG/DL — SIGNIFICANT CHANGE UP (ref 1.6–2.6)
MCHC RBC-ENTMCNC: 25.3 PG — LOW (ref 27–34)
MCHC RBC-ENTMCNC: 33.6 GM/DL — SIGNIFICANT CHANGE UP (ref 32–36)
MCV RBC AUTO: 75.3 FL — LOW (ref 80–100)
MONOCYTES # BLD AUTO: 1.03 K/UL — HIGH (ref 0–0.9)
MONOCYTES NFR BLD AUTO: 6.9 % — SIGNIFICANT CHANGE UP (ref 2–14)
NEUTROPHILS # BLD AUTO: 12.32 K/UL — HIGH (ref 1.8–7.4)
NEUTROPHILS NFR BLD AUTO: 82.7 % — HIGH (ref 43–77)
NRBC # BLD: 0 /100 WBCS — SIGNIFICANT CHANGE UP
NRBC # FLD: 0 K/UL — SIGNIFICANT CHANGE UP
PCO2 BLDV: 49 MMHG — SIGNIFICANT CHANGE UP (ref 41–51)
PH BLDV: 7.34 — SIGNIFICANT CHANGE UP (ref 7.32–7.43)
PLATELET # BLD AUTO: 233 K/UL — SIGNIFICANT CHANGE UP (ref 150–400)
PO2 BLDV: 40 MMHG — SIGNIFICANT CHANGE UP (ref 35–40)
POTASSIUM SERPL-MCNC: 4.7 MMOL/L — SIGNIFICANT CHANGE UP (ref 3.5–5.3)
POTASSIUM SERPL-SCNC: 4.7 MMOL/L — SIGNIFICANT CHANGE UP (ref 3.5–5.3)
PROT SERPL-MCNC: 8.4 G/DL — HIGH (ref 6–8.3)
PROTHROM AB SERPL-ACNC: 14.2 SEC — HIGH (ref 10.6–13.6)
RBC # BLD: 3.24 M/UL — LOW (ref 4.2–5.8)
RBC # FLD: 13.9 % — SIGNIFICANT CHANGE UP (ref 10.3–14.5)
SAO2 % BLDV: 73.5 % — SIGNIFICANT CHANGE UP (ref 60–85)
SODIUM SERPL-SCNC: 133 MMOL/L — LOW (ref 135–145)
WBC # BLD: 14.9 K/UL — HIGH (ref 3.8–10.5)
WBC # FLD AUTO: 14.9 K/UL — HIGH (ref 3.8–10.5)

## 2021-03-11 PROCEDURE — 99223 1ST HOSP IP/OBS HIGH 75: CPT

## 2021-03-11 PROCEDURE — 93010 ELECTROCARDIOGRAM REPORT: CPT

## 2021-03-11 PROCEDURE — 71045 X-RAY EXAM CHEST 1 VIEW: CPT | Mod: 26

## 2021-03-11 PROCEDURE — 73630 X-RAY EXAM OF FOOT: CPT | Mod: 26,LT

## 2021-03-11 PROCEDURE — 99285 EMERGENCY DEPT VISIT HI MDM: CPT | Mod: 25

## 2021-03-11 PROCEDURE — 93971 EXTREMITY STUDY: CPT | Mod: 26,LT

## 2021-03-11 RX ORDER — INSULIN GLARGINE 100 [IU]/ML
0 INJECTION, SOLUTION SUBCUTANEOUS
Qty: 0 | Refills: 0 | DISCHARGE

## 2021-03-11 RX ORDER — INSULIN GLARGINE 100 [IU]/ML
15 INJECTION, SOLUTION SUBCUTANEOUS AT BEDTIME
Refills: 0 | Status: DISCONTINUED | OUTPATIENT
Start: 2021-03-11 | End: 2021-03-14

## 2021-03-11 RX ORDER — INSULIN HUMAN 100 [IU]/ML
6 INJECTION, SOLUTION SUBCUTANEOUS ONCE
Refills: 0 | Status: DISCONTINUED | OUTPATIENT
Start: 2021-03-11 | End: 2021-03-11

## 2021-03-11 RX ORDER — SODIUM CHLORIDE 9 MG/ML
1000 INJECTION, SOLUTION INTRAVENOUS ONCE
Refills: 0 | Status: COMPLETED | OUTPATIENT
Start: 2021-03-11 | End: 2021-03-11

## 2021-03-11 RX ORDER — DEXTROSE 50 % IN WATER 50 %
12.5 SYRINGE (ML) INTRAVENOUS ONCE
Refills: 0 | Status: DISCONTINUED | OUTPATIENT
Start: 2021-03-11 | End: 2021-03-19

## 2021-03-11 RX ORDER — PIPERACILLIN AND TAZOBACTAM 4; .5 G/20ML; G/20ML
3.38 INJECTION, POWDER, LYOPHILIZED, FOR SOLUTION INTRAVENOUS EVERY 8 HOURS
Refills: 0 | Status: DISCONTINUED | OUTPATIENT
Start: 2021-03-11 | End: 2021-03-19

## 2021-03-11 RX ORDER — SODIUM CHLORIDE 9 MG/ML
1000 INJECTION, SOLUTION INTRAVENOUS
Refills: 0 | Status: DISCONTINUED | OUTPATIENT
Start: 2021-03-11 | End: 2021-03-14

## 2021-03-11 RX ORDER — DEXTROSE 50 % IN WATER 50 %
15 SYRINGE (ML) INTRAVENOUS ONCE
Refills: 0 | Status: DISCONTINUED | OUTPATIENT
Start: 2021-03-11 | End: 2021-03-19

## 2021-03-11 RX ORDER — INSULIN GLARGINE 100 [IU]/ML
20 INJECTION, SOLUTION SUBCUTANEOUS AT BEDTIME
Refills: 0 | Status: DISCONTINUED | OUTPATIENT
Start: 2021-03-11 | End: 2021-03-11

## 2021-03-11 RX ORDER — INSULIN LISPRO 100/ML
VIAL (ML) SUBCUTANEOUS EVERY 6 HOURS
Refills: 0 | Status: DISCONTINUED | OUTPATIENT
Start: 2021-03-11 | End: 2021-03-14

## 2021-03-11 RX ORDER — DEXTROSE 50 % IN WATER 50 %
25 SYRINGE (ML) INTRAVENOUS ONCE
Refills: 0 | Status: DISCONTINUED | OUTPATIENT
Start: 2021-03-11 | End: 2021-03-19

## 2021-03-11 RX ORDER — VANCOMYCIN HCL 1 G
1000 VIAL (EA) INTRAVENOUS ONCE
Refills: 0 | Status: COMPLETED | OUTPATIENT
Start: 2021-03-11 | End: 2021-03-11

## 2021-03-11 RX ORDER — INSULIN LISPRO 100/ML
VIAL (ML) SUBCUTANEOUS
Refills: 0 | Status: DISCONTINUED | OUTPATIENT
Start: 2021-03-11 | End: 2021-03-11

## 2021-03-11 RX ORDER — GLUCAGON INJECTION, SOLUTION 0.5 MG/.1ML
1 INJECTION, SOLUTION SUBCUTANEOUS ONCE
Refills: 0 | Status: DISCONTINUED | OUTPATIENT
Start: 2021-03-11 | End: 2021-03-19

## 2021-03-11 RX ORDER — SODIUM CHLORIDE 9 MG/ML
1000 INJECTION, SOLUTION INTRAVENOUS
Refills: 0 | Status: DISCONTINUED | OUTPATIENT
Start: 2021-03-11 | End: 2021-03-11

## 2021-03-11 RX ORDER — INSULIN LISPRO 100/ML
VIAL (ML) SUBCUTANEOUS AT BEDTIME
Refills: 0 | Status: DISCONTINUED | OUTPATIENT
Start: 2021-03-11 | End: 2021-03-11

## 2021-03-11 RX ORDER — AMLODIPINE BESYLATE 2.5 MG/1
2.5 TABLET ORAL DAILY
Refills: 0 | Status: DISCONTINUED | OUTPATIENT
Start: 2021-03-11 | End: 2021-03-13

## 2021-03-11 RX ADMIN — Medication 100 MILLIGRAM(S): at 18:47

## 2021-03-11 RX ADMIN — INSULIN HUMAN 6 UNIT(S): 100 INJECTION, SOLUTION SUBCUTANEOUS at 18:40

## 2021-03-11 RX ADMIN — SODIUM CHLORIDE 150 MILLILITER(S): 9 INJECTION, SOLUTION INTRAVENOUS at 19:17

## 2021-03-11 RX ADMIN — Medication 250 MILLIGRAM(S): at 19:17

## 2021-03-11 RX ADMIN — AMLODIPINE BESYLATE 2.5 MILLIGRAM(S): 2.5 TABLET ORAL at 19:59

## 2021-03-11 RX ADMIN — SODIUM CHLORIDE 1000 MILLILITER(S): 9 INJECTION, SOLUTION INTRAVENOUS at 16:26

## 2021-03-11 RX ADMIN — INSULIN GLARGINE 15 UNIT(S): 100 INJECTION, SOLUTION SUBCUTANEOUS at 20:00

## 2021-03-11 RX ADMIN — PIPERACILLIN AND TAZOBACTAM 25 GRAM(S): 4; .5 INJECTION, POWDER, LYOPHILIZED, FOR SOLUTION INTRAVENOUS at 22:14

## 2021-03-11 RX ADMIN — SODIUM CHLORIDE 75 MILLILITER(S): 9 INJECTION, SOLUTION INTRAVENOUS at 21:58

## 2021-03-11 NOTE — ED PROVIDER NOTE - ATTENDING CONTRIBUTION TO CARE
56M DM on pills p/w hyperglycemia to 500s.  Also reports some LLE swelling x 1 week.  Eval for DKA/HHS.  LLE swollen greater than R.  L foot with DM foot ulcer L distal lateral foot, complex, with diffuse midfoot and toes swelling.  Able to move toes.  Likely severe DM foot infection c/b hyperglycemia.  Check DVT study, rx abx fluids, xrays of foot and tib fib, podiatry eval.   EKG SR at 94 no prerna no std no twi.    qtc 427.  Given diabetic foot ulcer and cellulitis would admit for further w/u and treat.  VS:  (+)LG temp, tachycardia    GEN - NAD;  malaise;   A+O x3  Obese  HEAD - NC/AT     ENT - PEERL, EOMI, mucous membranes  dry , no discharge      NECK: Neck supple, non-tender without lymphadenopathy, no masses, no JVD  PULM - CTA b/l,  symmetric breath sounds  COR -  normal heart sounds    ABD - , ND, NT, soft,  BACK - no CVA tenderness, nontender spine     EXTREMS - (+)LLE edema + warmth to knee, no deformity, warm and well perfused .  L foot with DM foot ulcer L distal lateral foot, complex, with diffuse midfoot and toes swelling.  Able to move toes.  SKIN - no rash    or bruising    except as noted.   NEUROLOGIC - alert, face symmetric, speech fluent, sensation nl, motor no focal deficit.
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
## 2021-03-11 NOTE — H&P ADULT - NSHPPOADEEPVENOUSTHROMB_GEN_A_CORE
Patient went to get a xray and is asking if she can get a work excuse for today.  She is in a lot of pain. She would like to stop by on her way home after the xray to pick it up   no
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
## 2021-03-11 NOTE — H&P ADULT - NSHPREVIEWOFSYSTEMS_GEN_ALL_CORE
CONSTITUTIONAL: No weakness, fevers or chills  EYES/ENT: No visual changes;  No vertigo or throat pain   NECK: No pain or stiffness  RESPIRATORY: No cough, wheezing, hemoptysis; No shortness of breath  CARDIOVASCULAR: No chest pain or palpitations  GASTROINTESTINAL: No abdominal or epigastric pain. No nausea, vomiting, or hematemesis; No diarrhea or constipation. No melena or hematochezia.  GENITOURINARY: No dysuria, frequency or hematuria  NEUROLOGICAL: No numbness or weakness  SKIN: No itching, burning, rashes, or lesions   MSK: left foot infected  PSYCH: No Depression, no anxiety  All other review of systems is negative unless indicated above.

## 2021-03-11 NOTE — ED PROVIDER NOTE - CLINICAL SUMMARY MEDICAL DECISION MAKING FREE TEXT BOX
hyperglycemia for 1 day w. LE edema for 1 wk. no other symptoms, tachycardiac, concerning for dka vs HHS, and DVT, will u/s and give IVF, and DKA labs. nontoxic appearing.

## 2021-03-11 NOTE — ED PROVIDER NOTE - PHYSICAL EXAMINATION
General: NAD, good hygiene, large body habitus   HENT: Atraumatic, EOMI, no conjunctivae injection, moist mucosa.  Neck: normal ROM and trachea midline   Cardiovascular: tachycardiac, S1&2, no M or R, radial pulses equal and b/l  Respiratory: CTABL, no wheezes or crackles, no decreased breath sounds  Abdominal: soft and non-tender non distended, neg for guarding, no CVA tenderness   Extremities: left side LE edema of the legs/feet  Skin: warm, well perfused  Neurologic: nonfocal, AAOx3  Psych: normal mood and affect General: NAD, good hygiene, large body habitus   HENT: Atraumatic, EOMI, no conjunctivae injection, moist mucosa.  Neck: normal ROM and trachea midline   Cardiovascular: tachycardiac, S1&2, no M or R, radial pulses equal and b/l  Respiratory: CTABL, no wheezes or crackles, no decreased breath sounds  Abdominal: soft and non-tender non distended, neg for guarding, no CVA tenderness   Extremities: left side LE edema of the legs, left 4th and 5th toe showed extensive infection with pus drainage.   Skin: warm, well perfused  Neurologic: nonfocal, AAOx3  Psych: normal mood and affect

## 2021-03-11 NOTE — H&P ADULT - PROBLEM SELECTOR PLAN 3
- Patient not on bp meds. However, bp remains elevated during previous and this admission.  - Will start low dose amlodipine and monitor response. Uptitrate as needed.

## 2021-03-11 NOTE — H&P ADULT - PROBLEM SELECTOR PLAN 4
- RCRI score 0 with 3.9% MACE and able to perform met>4 activities; no previous cardiac/pulm history.  - No current contraindication to proceed with planned low risk procedure pending optimization of BP and diabetes/hyperglycemia.

## 2021-03-11 NOTE — H&P ADULT - HISTORY OF PRESENT ILLNESS
55yo M hx of HTN, diabetes presented with cc of left foot infection 1 week. Patient reported continue to ambulate without significant pain. Swelling is worsening with redness/warmth around the area. Patient was seen by PCP earlier today at a routine follow up and was advised to come to ED for further eval. Patient otherwise reported baseline functional status and able to ambulate two flight of stairs without sob or cp. Patient was noted hyperglycemic in the ED 600s and hypertensive 180s systolic. Given regular insulin 6u.

## 2021-03-11 NOTE — CONSULT NOTE ADULT - SUBJECTIVE AND OBJECTIVE BOX
Podiatry pager #: [phone] ([place])/ [phone] ([facility])    Patient is a 56y old  Male who presents with a chief complaint of     HPI:      PAST MEDICAL & SURGICAL HISTORY:  Diabetes    No significant past surgical history        MEDICATIONS  (STANDING):  clindamycin IVPB 900 milliGRAM(s) IV Intermittent Once  vancomycin  IVPB. 1000 milliGRAM(s) IV Intermittent once    MEDICATIONS  (PRN):      Allergies    No Known Drug Allergies  shellfish (Angioedema)    Intolerances        VITALS:    Vital Signs Last 24 Hrs  T(C): 37.7 (11 Mar 2021 15:20), Max: 37.7 (11 Mar 2021 15:20)  T(F): 99.8 (11 Mar 2021 15:20), Max: 99.8 (11 Mar 2021 15:20)  HR: 93 (11 Mar 2021 15:20) (93 - 93)  BP: 176/82 (11 Mar 2021 15:20) (176/82 - 176/82)  BP(mean): --  RR: 16 (11 Mar 2021 15:20) (16 - 16)  SpO2: 94% (11 Mar 2021 15:20) (94% - 94%)    LABS:                          8.2    14.90 )-----------( 233      ( 11 Mar 2021 17:54 )             24.4       03-11    133<L>  |  98  |  34<H>  ----------------------------<  x   4.7   |  25  |  1.53<H>    Ca    9.1      11 Mar 2021 17:54  Mg     1.8     03-11    TPro  8.4<H>  /  Alb  2.9<L>  /  TBili  0.3  /  DBili  x   /  AST  15  /  ALT  15  /  AlkPhos  162<H>  03-11      CAPILLARY BLOOD GLUCOSE      POCT Blood Glucose.: 586 mg/dL (11 Mar 2021 15:27)      PT/INR - ( 11 Mar 2021 17:54 )   PT: 14.2 sec;   INR: 1.26 ratio         PTT - ( 11 Mar 2021 17:54 )  PTT:31.6 sec    LOWER EXTREMITY PHYSICAL EXAM:    Vascular: DP/PT 2/4 B/L, CFT <3 seconds B/L, Temperature gradient warm to cool B/L  Neuro: Epicritic sensation dimished to the level of ankle B/L  Musculoskeletal/Ortho: unremarkable  Skin: left foot dorsal 5th met wound to bone, tracking up extensor tendon, 3+ edema, scant purulence, mild malodor, plantar blister, etiology: diabetic neuropathy     RADIOLOGY & ADDITIONAL STUDIES:    < from: Xray Foot AP + Lateral + Oblique, Left (03.11.21 @ 17:06) >    EXAM:  RAD FOOT MIN 3 VIEWS LT        PROCEDURE DATE:  Mar 11 2021         INTERPRETATION:  CLINICAL INDICATION: left foot infection; lateral forefoot soft tissue ulceration    EXAM:  Frontal, oblique, and lateral left foot from 3/11/2021 at 1706. No similar prior studies available for comparison.    IMPRESSION:  Diffuse soft tissue swelling. Focal soft tissue defect/ulceration over 5th IP joint region. Eroded appearing 5th proximal phalanx head with subluxed fused block distal phalanx suspicious for osteomyelitis. No tracking gas collections beyond this region and no additional suspected areas of acute osteomyelitis.    Flattened and slightly sclerotic appearing hallux distal phalanx may reflect sequelae of old trauma and/or old infection.    Intact and normally aligned remaining osteoarticular structures with preserved remaining joint spaces and no joint margin erosions.    Thick plantar and posterior calcaneal and smaller dorsal midfoot articular margin enthesophytes.    No discrete lytic or blastic lesions.              JUAN J MELVIN MD; Attending Radiologist  This document has been electronically signed. Mar 11 2021  5:36PM    < end of copied text >

## 2021-03-11 NOTE — H&P ADULT - NSHPPHYSICALEXAM_GEN_ALL_CORE
T(C): 37.7 (03-11-21 @ 15:20), Max: 37.7 (03-11-21 @ 15:20)  HR: 93 (03-11-21 @ 15:20) (93 - 93)  BP: 176/82 (03-11-21 @ 15:20) (176/82 - 176/82)  RR: 16 (03-11-21 @ 15:20) (16 - 16)  SpO2: 94% (03-11-21 @ 15:20) (94% - 94%)  Wt(kg): --  GENERAL: NAD, well-developed  HEAD:  Atraumatic, Normocephalic  EYES: EOMI, PERRLA, conjunctiva and sclera clear  NECK: Supple, No JVD  CHEST/LUNG: Clear to auscultation bilaterally; No wheeze  HEART: Regular rate and rhythm; No murmurs, rubs, or gallops  ABDOMEN: Soft, Nontender, Nondistended; Bowel sounds present  EXTREMITIES: left LE swelling, foot wound in dressing, CDI  PSYCH: AAOx3  NEUROLOGY: non-focal  SKIN: No rashes or lesions

## 2021-03-11 NOTE — ED PROVIDER NOTE - OBJECTIVE STATEMENT
56M, DM2, p.w elevated FS 500s today w. left leg swelling for 1 wk w.o any pain, fever, cough, shortness of breath. No dysuria or polyuria. Patient doesn't take insulin.

## 2021-03-11 NOTE — ED PROVIDER NOTE - PROGRESS NOTE DETAILS
Coleman Chew, PGY 3: after taking the socks off, patient has open wound and severe infection of the 4/5 toes, will cover for OM and podiatry is notified and will see the patient.

## 2021-03-11 NOTE — ED ADULT NURSE NOTE - OBJECTIVE STATEMENT
break coverage RN - pt received in room 2, A&OX4. Pt has history of DM, HTN, is non complaint with medications. Pt was at doctor's office today for evaluation of LLE swelling, was sent to ED d/t elevated blood sugar. Pt's fingerstick upon arrival to . Pt denies any CP, SOB, fevers, N/v/d, abdominal pain or any other symptoms. BLE noted to edematous, L>R. Resp even and unlabored. arrives w/ 18G IV placed to L forearm. Labs drawn & sent. Will continue to monitor. break coverage RN - pt received in room 2, A&OX4. Pt has history of DM, HTN, is non complaint with medications. Pt was at doctor's office today for evaluation of LLE swelling, was sent to ED d/t elevated blood sugar. Pt's fingerstick upon arrival to . Pt denies any CP, SOB, fevers, N/v/d, abdominal pain or any other symptoms. BLE noted to edematous, L>R. L pinky toe noted with purulent drainage, redness. Resp even and unlabored. arrives w/ 18G IV placed to L forearm. Labs drawn & sent. Will continue to monitor.

## 2021-03-11 NOTE — H&P ADULT - PROBLEM SELECTOR PLAN 2
- Restart lantus 15u at bedtime.  - Monitor FSG and cover with SSI.  - C/w IVF for hydration.  - NPO for now.

## 2021-03-11 NOTE — H&P ADULT - NSHPLABSRESULTS_GEN_ALL_CORE
(03-11 @ 17:54)                      8.2  14.90 )-----------( 233                 24.4    Neutrophils = 12.32 (82.7%)  Lymphocytes = 1.24 (8.3%)  Eosinophils = 0.16 (1.1%)  Basophils = 0.03 (0.2%)  Monocytes = 1.03 (6.9%)  Bands = --%    03-11    133<L>  |  98  |  34<H>  ----------------------------<  659<HH>  4.7   |  25  |  1.53<H>    Ca    9.1      11 Mar 2021 17:54  Mg     1.8     03-11    TPro  8.4<H>  /  Alb  2.9<L>  /  TBili  0.3  /  DBili  x   /  AST  15  /  ALT  15  /  AlkPhos  162<H>  03-11    ( 11 Mar 2021 17:54 )   PT: 14.2 sec;   INR: 1.26 ratio;    PTT:31.6 sec    Venous Blood Gas:  03-11 @ 17:54  7.34/49/40/24/73.5  VBG Lactate: -

## 2021-03-12 LAB
A1C WITH ESTIMATED AVERAGE GLUCOSE RESULT: 12.2 % — HIGH (ref 4–5.6)
ALBUMIN SERPL ELPH-MCNC: 2 G/DL — LOW (ref 3.3–5)
ALP SERPL-CCNC: 121 U/L — HIGH (ref 40–120)
ALT FLD-CCNC: 14 U/L — SIGNIFICANT CHANGE UP (ref 4–41)
ANION GAP SERPL CALC-SCNC: 7 MMOL/L — SIGNIFICANT CHANGE UP (ref 7–14)
AST SERPL-CCNC: 14 U/L — SIGNIFICANT CHANGE UP (ref 4–40)
BILIRUB SERPL-MCNC: 0.4 MG/DL — SIGNIFICANT CHANGE UP (ref 0.2–1.2)
BUN SERPL-MCNC: 26 MG/DL — HIGH (ref 7–23)
CALCIUM SERPL-MCNC: 8.8 MG/DL — SIGNIFICANT CHANGE UP (ref 8.4–10.5)
CHLORIDE SERPL-SCNC: 103 MMOL/L — SIGNIFICANT CHANGE UP (ref 98–107)
CO2 SERPL-SCNC: 24 MMOL/L — SIGNIFICANT CHANGE UP (ref 22–31)
CREAT SERPL-MCNC: 1.32 MG/DL — HIGH (ref 0.5–1.3)
ESTIMATED AVERAGE GLUCOSE: 303 MG/DL — HIGH (ref 68–114)
GLUCOSE BLDC GLUCOMTR-MCNC: 209 MG/DL — HIGH (ref 70–99)
GLUCOSE BLDC GLUCOMTR-MCNC: 215 MG/DL — HIGH (ref 70–99)
GLUCOSE BLDC GLUCOMTR-MCNC: 218 MG/DL — HIGH (ref 70–99)
GLUCOSE BLDC GLUCOMTR-MCNC: 249 MG/DL — HIGH (ref 70–99)
GLUCOSE BLDC GLUCOMTR-MCNC: 419 MG/DL — HIGH (ref 70–99)
GLUCOSE SERPL-MCNC: 225 MG/DL — HIGH (ref 70–99)
HCT VFR BLD CALC: 21.7 % — LOW (ref 39–50)
HCT VFR BLD CALC: 22.5 % — LOW (ref 39–50)
HCV AB S/CO SERPL IA: 0.17 S/CO — SIGNIFICANT CHANGE UP (ref 0–0.99)
HCV AB SERPL-IMP: SIGNIFICANT CHANGE UP
HGB BLD-MCNC: 7.3 G/DL — LOW (ref 13–17)
HGB BLD-MCNC: 7.5 G/DL — LOW (ref 13–17)
MAGNESIUM SERPL-MCNC: 1.8 MG/DL — SIGNIFICANT CHANGE UP (ref 1.6–2.6)
MCHC RBC-ENTMCNC: 25.7 PG — LOW (ref 27–34)
MCHC RBC-ENTMCNC: 25.8 PG — LOW (ref 27–34)
MCHC RBC-ENTMCNC: 33.3 GM/DL — SIGNIFICANT CHANGE UP (ref 32–36)
MCHC RBC-ENTMCNC: 33.6 GM/DL — SIGNIFICANT CHANGE UP (ref 32–36)
MCV RBC AUTO: 76.4 FL — LOW (ref 80–100)
MCV RBC AUTO: 77.3 FL — LOW (ref 80–100)
NRBC # BLD: 0 /100 WBCS — SIGNIFICANT CHANGE UP
NRBC # BLD: 0 /100 WBCS — SIGNIFICANT CHANGE UP
NRBC # FLD: 0 K/UL — SIGNIFICANT CHANGE UP
NRBC # FLD: 0 K/UL — SIGNIFICANT CHANGE UP
PLATELET # BLD AUTO: 221 K/UL — SIGNIFICANT CHANGE UP (ref 150–400)
PLATELET # BLD AUTO: 243 K/UL — SIGNIFICANT CHANGE UP (ref 150–400)
POTASSIUM SERPL-MCNC: 4.5 MMOL/L — SIGNIFICANT CHANGE UP (ref 3.5–5.3)
POTASSIUM SERPL-SCNC: 4.5 MMOL/L — SIGNIFICANT CHANGE UP (ref 3.5–5.3)
PROT SERPL-MCNC: 7.1 G/DL — SIGNIFICANT CHANGE UP (ref 6–8.3)
RBC # BLD: 2.84 M/UL — LOW (ref 4.2–5.8)
RBC # BLD: 2.91 M/UL — LOW (ref 4.2–5.8)
RBC # FLD: 13.7 % — SIGNIFICANT CHANGE UP (ref 10.3–14.5)
RBC # FLD: 14 % — SIGNIFICANT CHANGE UP (ref 10.3–14.5)
SARS-COV-2 RNA SPEC QL NAA+PROBE: SIGNIFICANT CHANGE UP
SODIUM SERPL-SCNC: 134 MMOL/L — LOW (ref 135–145)
WBC # BLD: 14.67 K/UL — HIGH (ref 3.8–10.5)
WBC # BLD: 15.05 K/UL — HIGH (ref 3.8–10.5)
WBC # FLD AUTO: 14.67 K/UL — HIGH (ref 3.8–10.5)
WBC # FLD AUTO: 15.05 K/UL — HIGH (ref 3.8–10.5)

## 2021-03-12 RX ORDER — AMLODIPINE BESYLATE 2.5 MG/1
5 TABLET ORAL ONCE
Refills: 0 | Status: COMPLETED | OUTPATIENT
Start: 2021-03-12 | End: 2021-03-12

## 2021-03-12 RX ADMIN — PIPERACILLIN AND TAZOBACTAM 25 GRAM(S): 4; .5 INJECTION, POWDER, LYOPHILIZED, FOR SOLUTION INTRAVENOUS at 22:43

## 2021-03-12 RX ADMIN — Medication 2: at 12:56

## 2021-03-12 RX ADMIN — SODIUM CHLORIDE 75 MILLILITER(S): 9 INJECTION, SOLUTION INTRAVENOUS at 02:50

## 2021-03-12 RX ADMIN — INSULIN GLARGINE 15 UNIT(S): 100 INJECTION, SOLUTION SUBCUTANEOUS at 23:08

## 2021-03-12 RX ADMIN — Medication 2: at 23:07

## 2021-03-12 RX ADMIN — PIPERACILLIN AND TAZOBACTAM 25 GRAM(S): 4; .5 INJECTION, POWDER, LYOPHILIZED, FOR SOLUTION INTRAVENOUS at 15:56

## 2021-03-12 RX ADMIN — Medication 6: at 00:45

## 2021-03-12 RX ADMIN — AMLODIPINE BESYLATE 5 MILLIGRAM(S): 2.5 TABLET ORAL at 23:48

## 2021-03-12 RX ADMIN — Medication 2: at 06:18

## 2021-03-12 RX ADMIN — PIPERACILLIN AND TAZOBACTAM 25 GRAM(S): 4; .5 INJECTION, POWDER, LYOPHILIZED, FOR SOLUTION INTRAVENOUS at 06:03

## 2021-03-12 RX ADMIN — Medication 2: at 17:27

## 2021-03-12 NOTE — PROGRESS NOTE ADULT - ASSESSMENT
Plan:   Pt is scheduled for LF partial 5th ray resection with Dr. Magana at 4pm. Patient is aware of procedure and is NPO since midnight.  CXR on sunrise.  EKG on sunrise.  Medical clearance since 3/11and documented in chart.  Consent signed and in chart.  Procedure was explained to patient in detail. All alternatives, risks and complications were discussed. All questions answered. Plan:   Pt is scheduled for LF partial 5th ray resection with Dr. Magana at 4pm. Patient is aware of procedure and is NPO since midnight.  CXR on sunrise.  EKG on sunrise.  Medical clearance since 3/11and documented in chart.  Consent signed and in chart.  Procedure was explained to patient in detail. All alternatives, risks and complications were discussed. All questions answered.  At this time patient decided against surgery. Lengthy explanation and still refused

## 2021-03-13 ENCOUNTER — TRANSCRIPTION ENCOUNTER (OUTPATIENT)
Age: 56
End: 2021-03-13

## 2021-03-13 LAB
-  AMIKACIN: SIGNIFICANT CHANGE UP
-  AMOXICILLIN/CLAVULANIC ACID: SIGNIFICANT CHANGE UP
-  AMPICILLIN/SULBACTAM: SIGNIFICANT CHANGE UP
-  AMPICILLIN: SIGNIFICANT CHANGE UP
-  AZTREONAM: SIGNIFICANT CHANGE UP
-  CEFAZOLIN: SIGNIFICANT CHANGE UP
-  CEFEPIME: SIGNIFICANT CHANGE UP
-  CEFOXITIN: SIGNIFICANT CHANGE UP
-  CEFTRIAXONE: SIGNIFICANT CHANGE UP
-  CIPROFLOXACIN: SIGNIFICANT CHANGE UP
-  ERTAPENEM: SIGNIFICANT CHANGE UP
-  GENTAMICIN: SIGNIFICANT CHANGE UP
-  IMIPENEM: SIGNIFICANT CHANGE UP
-  LEVOFLOXACIN: SIGNIFICANT CHANGE UP
-  MEROPENEM: SIGNIFICANT CHANGE UP
-  PIPERACILLIN/TAZOBACTAM: SIGNIFICANT CHANGE UP
-  TOBRAMYCIN: SIGNIFICANT CHANGE UP
-  TRIMETHOPRIM/SULFAMETHOXAZOLE: SIGNIFICANT CHANGE UP
ALBUMIN SERPL ELPH-MCNC: 2.2 G/DL — LOW (ref 3.3–5)
ALP SERPL-CCNC: 126 U/L — HIGH (ref 40–120)
ALT FLD-CCNC: 14 U/L — SIGNIFICANT CHANGE UP (ref 4–41)
ANION GAP SERPL CALC-SCNC: 6 MMOL/L — LOW (ref 7–14)
AST SERPL-CCNC: 17 U/L — SIGNIFICANT CHANGE UP (ref 4–40)
BILIRUB SERPL-MCNC: 0.4 MG/DL — SIGNIFICANT CHANGE UP (ref 0.2–1.2)
BUN SERPL-MCNC: 20 MG/DL — SIGNIFICANT CHANGE UP (ref 7–23)
CALCIUM SERPL-MCNC: 8.8 MG/DL — SIGNIFICANT CHANGE UP (ref 8.4–10.5)
CHLORIDE SERPL-SCNC: 103 MMOL/L — SIGNIFICANT CHANGE UP (ref 98–107)
CO2 SERPL-SCNC: 27 MMOL/L — SIGNIFICANT CHANGE UP (ref 22–31)
CREAT SERPL-MCNC: 1.37 MG/DL — HIGH (ref 0.5–1.3)
GLUCOSE BLDC GLUCOMTR-MCNC: 183 MG/DL — HIGH (ref 70–99)
GLUCOSE BLDC GLUCOMTR-MCNC: 203 MG/DL — HIGH (ref 70–99)
GLUCOSE BLDC GLUCOMTR-MCNC: 214 MG/DL — HIGH (ref 70–99)
GLUCOSE BLDC GLUCOMTR-MCNC: 288 MG/DL — HIGH (ref 70–99)
GLUCOSE SERPL-MCNC: 171 MG/DL — HIGH (ref 70–99)
HCT VFR BLD CALC: 23.1 % — LOW (ref 39–50)
HGB BLD-MCNC: 7.8 G/DL — LOW (ref 13–17)
INR BLD: 1.39 RATIO — HIGH (ref 0.88–1.16)
MAGNESIUM SERPL-MCNC: 1.7 MG/DL — SIGNIFICANT CHANGE UP (ref 1.6–2.6)
MCHC RBC-ENTMCNC: 26.3 PG — LOW (ref 27–34)
MCHC RBC-ENTMCNC: 33.8 GM/DL — SIGNIFICANT CHANGE UP (ref 32–36)
MCV RBC AUTO: 77.8 FL — LOW (ref 80–100)
METHOD TYPE: SIGNIFICANT CHANGE UP
NRBC # BLD: 0 /100 WBCS — SIGNIFICANT CHANGE UP
NRBC # FLD: 0 K/UL — SIGNIFICANT CHANGE UP
PHOSPHATE SERPL-MCNC: 3.3 MG/DL — SIGNIFICANT CHANGE UP (ref 2.5–4.5)
PLATELET # BLD AUTO: 252 K/UL — SIGNIFICANT CHANGE UP (ref 150–400)
POTASSIUM SERPL-MCNC: 3.8 MMOL/L — SIGNIFICANT CHANGE UP (ref 3.5–5.3)
POTASSIUM SERPL-SCNC: 3.8 MMOL/L — SIGNIFICANT CHANGE UP (ref 3.5–5.3)
PROT SERPL-MCNC: 7.3 G/DL — SIGNIFICANT CHANGE UP (ref 6–8.3)
PROTHROM AB SERPL-ACNC: 15.6 SEC — HIGH (ref 10.6–13.6)
RBC # BLD: 2.97 M/UL — LOW (ref 4.2–5.8)
RBC # FLD: 13.8 % — SIGNIFICANT CHANGE UP (ref 10.3–14.5)
SARS-COV-2 RNA SPEC QL NAA+PROBE: SIGNIFICANT CHANGE UP
SODIUM SERPL-SCNC: 136 MMOL/L — SIGNIFICANT CHANGE UP (ref 135–145)
WBC # BLD: 12.92 K/UL — HIGH (ref 3.8–10.5)
WBC # FLD AUTO: 12.92 K/UL — HIGH (ref 3.8–10.5)

## 2021-03-13 PROCEDURE — 99223 1ST HOSP IP/OBS HIGH 75: CPT

## 2021-03-13 RX ORDER — AMLODIPINE BESYLATE 2.5 MG/1
5 TABLET ORAL DAILY
Refills: 0 | Status: DISCONTINUED | OUTPATIENT
Start: 2021-03-14 | End: 2021-03-15

## 2021-03-13 RX ORDER — HYDRALAZINE HCL 50 MG
25 TABLET ORAL ONCE
Refills: 0 | Status: COMPLETED | OUTPATIENT
Start: 2021-03-13 | End: 2021-03-13

## 2021-03-13 RX ORDER — HYDRALAZINE HCL 50 MG
10 TABLET ORAL THREE TIMES A DAY
Refills: 0 | Status: DISCONTINUED | OUTPATIENT
Start: 2021-03-13 | End: 2021-03-16

## 2021-03-13 RX ORDER — AMLODIPINE BESYLATE 2.5 MG/1
2.5 TABLET ORAL ONCE
Refills: 0 | Status: COMPLETED | OUTPATIENT
Start: 2021-03-13 | End: 2021-03-13

## 2021-03-13 RX ADMIN — AMLODIPINE BESYLATE 2.5 MILLIGRAM(S): 2.5 TABLET ORAL at 06:11

## 2021-03-13 RX ADMIN — Medication 2: at 12:50

## 2021-03-13 RX ADMIN — INSULIN GLARGINE 15 UNIT(S): 100 INJECTION, SOLUTION SUBCUTANEOUS at 23:05

## 2021-03-13 RX ADMIN — PIPERACILLIN AND TAZOBACTAM 25 GRAM(S): 4; .5 INJECTION, POWDER, LYOPHILIZED, FOR SOLUTION INTRAVENOUS at 06:11

## 2021-03-13 RX ADMIN — PIPERACILLIN AND TAZOBACTAM 25 GRAM(S): 4; .5 INJECTION, POWDER, LYOPHILIZED, FOR SOLUTION INTRAVENOUS at 22:52

## 2021-03-13 RX ADMIN — Medication 3: at 17:57

## 2021-03-13 RX ADMIN — Medication 1: at 06:21

## 2021-03-13 RX ADMIN — Medication 10 MILLIGRAM(S): at 17:25

## 2021-03-13 RX ADMIN — PIPERACILLIN AND TAZOBACTAM 25 GRAM(S): 4; .5 INJECTION, POWDER, LYOPHILIZED, FOR SOLUTION INTRAVENOUS at 12:53

## 2021-03-13 RX ADMIN — Medication 2: at 23:04

## 2021-03-13 RX ADMIN — AMLODIPINE BESYLATE 2.5 MILLIGRAM(S): 2.5 TABLET ORAL at 08:39

## 2021-03-13 RX ADMIN — Medication 10 MILLIGRAM(S): at 22:52

## 2021-03-13 NOTE — CONSULT NOTE ADULT - ASSESSMENT
ASSESSMENT:    RECOMMENDATIONS:    ALYSSA Gandhi, MD  Fellow, Infectious Diseases  Pager: [phone]  If no response, after 5pm and on Weekends: Call [phone] 56/M with PMH DM (HbA1c 12.2).  P/w worsening lt foot pain, swelling discharge X 1 week. No systemic symptoms, prior trauma  In ED, afebrile with elevated BP. Labs revealed WBC 14,900, Hb 8.2, BUN 34, Cr 1.53, hyperglycemic.   Eval by Podiatry in ER - left foot dorsal 5th met wound to bone, tracking up extensor tendon, 3+ edema, scant purulence, mild malodor, plantar blister. I&D performed, was planned for Lt foot partial 5th ray resection 3/12, but pt refused  ID consulted for abx recs  ======  Left 5th toe diabetic foot infection  - prelim wound cx showing Strep. agalactiae, Rare K. pneumoniae  - HbA1c 12.2. D/w patient about risk of worsening infection in DM, concern for underlying bone involvement and that abx alone would not control the infection. Discussed that surgery with adjunctive abx would help in appropriate mgmt  - LLE U/S no DVT with Lt groin LNpathy. CXR clear, Hba1c 12.2. COVID19 negative    RECOMMENDATIONS:  - continue Zosyn 3.375g IV q8h  - f/u with Podiatry  - f/u blood cx and wound cx  - local dressing recs per Podiatry    ALYSSA Gandhi, MD  Fellow, Infectious Diseases  Pager: [phone]  If no response, after 5pm and on Weekends: Call [phone]

## 2021-03-13 NOTE — CONSULT NOTE ADULT - SUBJECTIVE AND OBJECTIVE BOX
Patient is a 56y old  Male who presents with a chief complaint of Foot infection (12 Mar 2021 10:51)    HPI:  55yo M hx of HTN, diabetes presented with cc of left foot infection 1 week. Patient reported continue to ambulate without significant pain. Swelling is worsening with redness/warmth around the area. Patient was seen by PCP earlier today at a routine follow up and was advised to come to ED for further eval. Patient otherwise reported baseline functional status and able to ambulate two flight of stairs without sob or cp. Patient was noted hyperglycemic in the ED 600s and hypertensive 180s systolic. Given regular insulin 6u. (11 Mar 2021 19:44)  =======  - Eval by Podiatry in ER - Skin: left foot dorsal 5th met wound to bone, tracking up extensor tendon, 3+ edema, scant purulence, mild malodor, plantar blister. Incision and drainage performed using sterile #15 blade along dorsal area of tracking down to SubQ but not beyond, flushed, packed, hemostasis achieved w/ surgicel. Wound culture taken  - Was planned for Lt foot partial 5th ray resection 3/12, but pt refused  - ID consulted for recs    prior hospital charts reviewed [  ]  primary team notes reviewed [  ]  other consultant notes reviewed [  ]    PAST MEDICAL & SURGICAL HISTORY:  Diabetes    No significant past surgical history      Allergies  No Known Drug Allergies  shellfish (Angioedema)    ANTIMICROBIALS (past 90 days)  MEDICATIONS  (STANDING):  clindamycin IVPB   100 mL/Hr IV Intermittent (03-11-21 @ 18:47)    piperacillin/tazobactam IVPB..   25 mL/Hr IV Intermittent (03-13-21 @ 06:11)   25 mL/Hr IV Intermittent (03-12-21 @ 22:43)   25 mL/Hr IV Intermittent (03-12-21 @ 15:56)   25 mL/Hr IV Intermittent (03-12-21 @ 06:03)   25 mL/Hr IV Intermittent (03-11-21 @ 22:14)    vancomycin  IVPB.   250 mL/Hr IV Intermittent (03-11-21 @ 19:17)      ANTIMICROBIALS:    piperacillin/tazobactam IVPB.. 3.375 every 8 hours    OTHER MEDS: MEDICATIONS  (STANDING):  amLODIPine   Tablet 2.5 daily  dextrose 40% Gel 15 once  dextrose 50% Injectable 25 once  dextrose 50% Injectable 12.5 once  dextrose 50% Injectable 25 once  glucagon  Injectable 1 once  insulin glargine Injectable (LANTUS) 15 at bedtime  insulin lispro (ADMELOG) corrective regimen sliding scale  every 6 hours    SOCIAL HISTORY:   hx smoking  non-smoker    FAMILY HISTORY:  No pertinent family history in first degree relatives      REVIEW OF SYSTEMS  [  ] ROS unobtainable because:    [  ] All other systems negative except as noted below:	    Constitutional:  [ ] fever [ ] chills  [ ] weight loss  [ ] weakness  Skin:  [ ] rash [ ] phlebitis	  Eyes: [ ] icterus [ ] pain  [ ] discharge	  ENMT: [ ] sore throat  [ ] thrush [ ] ulcers [ ] exudates  Respiratory: [ ] dyspnea [ ] hemoptysis [ ] cough [ ] sputum	  Cardiovascular:  [ ] chest pain [ ] palpitations [ ] edema	  Gastrointestinal:  [ ] nausea [ ] vomiting [ ] diarrhea [ ] constipation [ ] pain	  Genitourinary:  [ ] dysuria [ ] frequency [ ] hematuria [ ] discharge [ ] flank pain  [ ] incontinence  Musculoskeletal:  [ ] myalgias [ ] arthralgias [ ] arthritis  [ ] back pain  Neurological:  [ ] headache [ ] seizures  [ ] confusion/altered mental status  Psychiatric:  [ ] anxiety [ ] depression	  Hematology/Lymphatics:  [ ] lymphadenopathy  Endocrine:  [ ] adrenal [ ] thyroid  Allergic/Immunologic:	 [ ] transplant [ ] seasonal    Vital Signs Last 24 Hrs  T(F): 98.9 (03-13-21 @ 06:10), Max: 99.8 (03-11-21 @ 15:20)  Vital Signs Last 24 Hrs  HR: 78 (03-13-21 @ 06:10) (60 - 89)  BP: 151/68 (03-13-21 @ 06:10) (151/68 - 180/80)  RR: 17 (03-13-21 @ 06:10)  SpO2: 95% (03-13-21 @ 06:10) (95% - 97%)  Wt(kg): --    EXAM:  Constitutional: Not in acute distress  Eyes: pupils bilaterally reactive to light. No icterus.  Oral cavity: Clear, no lesions  Neck: No neck vein distension noted  RS: Chest clear to auscultation bilaterally. No wheeze/rhonchi/crepitations.  CVS: S1, S2 heard. Regular rate and rhythm. No murmurs/rubs/gallops.  Abdomen: Soft. No guarding/rigidity/tenderness.  : No acute abnormalities  Extremities: Warm. No pedal edema  Skin: No lesions noted  Vascular: No evidence of phlebitis  Neuro: Alert, oriented to time/place/person                          7.5    15.05 )-----------( 243      ( 12 Mar 2021 11:38 )             22.5     03-12    134<L>  |  103  |  26<H>  ----------------------------<  225<H>  4.5   |  24  |  1.32<H>    Ca    8.8      12 Mar 2021 08:52  Mg     1.8     03-12    TPro  7.1  /  Alb  2.0<L>  /  TBili  0.4  /  DBili  x   /  AST  14  /  ALT  14  /  AlkPhos  121<H>  03-12    MICROBIOLOGY:  Culture - Abscess with Gram Stain (collected 11 Mar 2021 23:12)  Source: .Abscess foot  Preliminary Report (12 Mar 2021 21:08):    Rare Klebsiella pneumoniae    Moderate Streptococcus agalactiae (Group B)    Streptococcus agalactiae (Group B) isolated    Group B streptococci are susceptible to ampicillin,    penicillin and cefazolin, but may be resistant to    erythromycin and clindamycin.    Recommendations for intrapartum prophylaxis for Group B    streptococci are penicillin or ampicillin.    Culture - Blood (collected 11 Mar 2021 23:05)  Source: .Blood Blood-Venous  Preliminary Report (13 Mar 2021 01:02):    No growth to date.    Culture - Blood (collected 11 Mar 2021 23:04)  Source: .Blood Blood-Peripheral  Preliminary Report (13 Mar 2021 01:02):    No growth to date.                    RADIOLOGY:  imaging below personally reviewed  < from: Xray Chest 1 View AP/PA (03.11.21 @ 20:55) >  Asymmetric elevation of the right hemidiaphragm. Right basilar linear atelectasis, otherwise clear lungs.  < end of copied text >    < from: US Duplex Venous Lower Ext Ltd, Left (ED) (03.11.21 @ 17:04) >  Impression: no proximal DVT (  left    ) lower extremity.  Multiple lymph nodes noted L femoral area especially.  < end of copied text >      OTHER TESTS:     Patient is a 56y old  Male who presents with a chief complaint of Foot infection (12 Mar 2021 10:51)    HPI:  55yo M hx of HTN, diabetes presented with cc of left foot infection 1 week. Patient reported continue to ambulate without significant pain. Swelling is worsening with redness/warmth around the area. Patient was seen by PCP earlier today at a routine follow up and was advised to come to ED for further eval. Patient otherwise reported baseline functional status and able to ambulate two flight of stairs without sob or cp. Patient was noted hyperglycemic in the ED 600s and hypertensive 180s systolic. Given regular insulin 6u. (11 Mar 2021 19:44)  =======  - Eval by Podiatry in ER - Skin: left foot dorsal 5th met wound to bone, tracking up extensor tendon, 3+ edema, scant purulence, mild malodor, plantar blister. Incision and drainage performed using sterile #15 blade along dorsal area of tracking down to SubQ but not beyond, flushed, packed, hemostasis achieved w/ surgicel. Wound culture taken  - Was planned for Lt foot partial 5th ray resection 3/12, but pt refused  - ID consulted for recs  -------  - Above HPI reviewed and reconciled with patient  - Denied fevers, chills, cough, coryza, dysuria, loose stools, recent travel, sick contacts, trauma to leg, pets/outdoors exposure, recent dental work    prior hospital charts reviewed [x]  primary team notes reviewed [x]  other consultant notes reviewed [x]    PAST MEDICAL & SURGICAL HISTORY:  Diabetes    No significant past surgical history      Allergies  No Known Drug Allergies  shellfish (Angioedema)    ANTIMICROBIALS (past 90 days)  MEDICATIONS  (STANDING):  clindamycin IVPB   100 mL/Hr IV Intermittent (03-11-21 @ 18:47)    piperacillin/tazobactam IVPB..   25 mL/Hr IV Intermittent (03-13-21 @ 06:11)   25 mL/Hr IV Intermittent (03-12-21 @ 22:43)   25 mL/Hr IV Intermittent (03-12-21 @ 15:56)   25 mL/Hr IV Intermittent (03-12-21 @ 06:03)   25 mL/Hr IV Intermittent (03-11-21 @ 22:14)    vancomycin  IVPB.   250 mL/Hr IV Intermittent (03-11-21 @ 19:17)      ANTIMICROBIALS:    piperacillin/tazobactam IVPB.. 3.375 every 8 hours    OTHER MEDS: MEDICATIONS  (STANDING):  amLODIPine   Tablet 2.5 daily  dextrose 40% Gel 15 once  dextrose 50% Injectable 25 once  dextrose 50% Injectable 12.5 once  dextrose 50% Injectable 25 once  glucagon  Injectable 1 once  insulin glargine Injectable (LANTUS) 15 at bedtime  insulin lispro (ADMELOG) corrective regimen sliding scale  every 6 hours    SOCIAL HISTORY:  - lives in [place]  - denied smoking/alcohol/recreational drug use    FAMILY HISTORY: No h/o DM in family      REVIEW OF SYSTEMS  [  ] ROS unobtainable because:    [x] All other systems negative except as noted below:	  Constitutional:  [ ] fever [ ] chills  [ ] weight loss  [ ] weakness  Skin:  [ ] rash [ ] phlebitis	  Eyes: [ ] icterus [ ] pain  [ ] discharge	  ENMT: [ ] sore throat  [ ] thrush [ ] ulcers [ ] exudates  Respiratory: [ ] dyspnea [ ] hemoptysis [ ] cough [ ] sputum	  Cardiovascular:  [ ] chest pain [ ] palpitations [ ] edema	  Gastrointestinal:  [ ] nausea [ ] vomiting [ ] diarrhea [ ] constipation [ ] pain	  Genitourinary:  [ ] dysuria [ ] frequency [ ] hematuria [ ] discharge [ ] flank pain  [ ] incontinence  Musculoskeletal:  [ ] myalgias [ ] arthralgias [ ] arthritis  [ ] back pain [x] leg wound  Neurological:  [ ] headache [ ] seizures  [ ] confusion/altered mental status  Psychiatric:  [ ] anxiety [ ] depression	  Hematology/Lymphatics:  [ ] lymphadenopathy  Endocrine:  [ ] adrenal [ ] thyroid  Allergic/Immunologic:	 [ ] transplant [ ] seasonal    Vital Signs Last 24 Hrs  T(F): 98.9 (03-13-21 @ 06:10), Max: 99.8 (03-11-21 @ 15:20)  Vital Signs Last 24 Hrs  HR: 78 (03-13-21 @ 06:10) (60 - 89)  BP: 151/68 (03-13-21 @ 06:10) (151/68 - 180/80)  RR: 17 (03-13-21 @ 06:10)  SpO2: 95% (03-13-21 @ 06:10) (95% - 97%)  Wt(kg): --    EXAM:  Constitutional: Not in acute distress. On RA  Eyes: pupils bilaterally reactive to light. No icterus.  Oral cavity: Clear. Dental caries and dental prosthesis noted.  Neck: No neck vein distension noted  RS: Chest clear to auscultation bilaterally. No wheeze/rhonchi/crepitations.  CVS: S1, S2 heard. Regular rate and rhythm. No murmurs/rubs/gallops.  Abdomen: Soft. No guarding/rigidity/tenderness.  : No acute abnormalities  Extremities: Warm. B/l Non-pitting pedal edema  Skin: Left 5th toe - swelling and purulent discharge noted. No tenderness or periwound erythema. Foot appears warm.  Vascular: No evidence of phlebitis  Neuro: Alert, oriented to time/place/person                          7.5    15.05 )-----------( 243      ( 12 Mar 2021 11:38 )             22.5     03-12    134<L>  |  103  |  26<H>  ----------------------------<  225<H>  4.5   |  24  |  1.32<H>    Ca    8.8      12 Mar 2021 08:52  Mg     1.8     03-12    TPro  7.1  /  Alb  2.0<L>  /  TBili  0.4  /  DBili  x   /  AST  14  /  ALT  14  /  AlkPhos  121<H>  03-12    MICROBIOLOGY:  Culture - Abscess with Gram Stain (collected 11 Mar 2021 23:12)  Source: .Abscess foot  Preliminary Report (12 Mar 2021 21:08):    Rare Klebsiella pneumoniae    Moderate Streptococcus agalactiae (Group B)    Streptococcus agalactiae (Group B) isolated    Group B streptococci are susceptible to ampicillin,    penicillin and cefazolin, but may be resistant to    erythromycin and clindamycin.    Recommendations for intrapartum prophylaxis for Group B    streptococci are penicillin or ampicillin.    Culture - Blood (collected 11 Mar 2021 23:05)  Source: .Blood Blood-Venous  Preliminary Report (13 Mar 2021 01:02):    No growth to date.    Culture - Blood (collected 11 Mar 2021 23:04)  Source: .Blood Blood-Peripheral  Preliminary Report (13 Mar 2021 01:02):    No growth to date.        RADIOLOGY:  imaging below personally reviewed  < from: Xray Chest 1 View AP/PA (03.11.21 @ 20:55) >  Asymmetric elevation of the right hemidiaphragm. Right basilar linear atelectasis, otherwise clear lungs.  < end of copied text >    < from: US Duplex Venous Lower Ext Ltd, Left (ED) (03.11.21 @ 17:04) >  Impression: no proximal DVT (  left    ) lower extremity.  Multiple lymph nodes noted L femoral area especially.  < end of copied text >      OTHER TESTS:

## 2021-03-13 NOTE — CONSULT NOTE ADULT - ATTENDING COMMENTS
Patient seen and examined  Above verified  Agree with above unless as noted below.  56/M with PMH DM (HbA1c 12.2).  P/w worsening lt foot pain, swelling discharge X 1 week. No systemic symptoms, prior trauma  In ED, afebrile with elevated BP. Labs revealed WBC 14,900, Hb 8.2, BUN 34, Cr 1.53, hyperglycemic.   Eval by Podiatry in ER - left foot dorsal 5th met wound to bone, tracking up extensor tendon, 3+ edema, scant purulence, mild malodor, plantar blister. I&D performed, was planned for Lt foot partial 5th ray resection 3/12, but pt refused  ID consulted for abx recs  Given extensive nature of toe involvement + poorly controlled DM I am not certain medical therapy alone will be curative  Have d/w pt-he in any case has reconsidered and d/w podiatry and is scheduled for toe resection tomorrow  Rec:  A) Toe cellulitis/OM  follow Cx  surgical plan per podiatry  continue zosyn  Tailor per results and course  duration will depend on margins and wound healing    B) DM  optimized DM control to promote wound healing  consider endocrine consult    Will tailor plan for ID issues  per course,results.Will defer to primary team on management of other issues.  Assessment, plan and recommendations as detailed above were discussed with the medical/primary  team.  Will Follow.  Beeper [phone] [facility] [id number].   Wknd/afterhours/No response-[phone] or Fellow on call

## 2021-03-13 NOTE — PROVIDER CONTACT NOTE (OTHER) - RECOMMENDATIONS
pressure to be closely monitored
provider was notified on course of action and new reading of 178/90, hydralazine 10mg was administred.
pressure to be closely monitored
pressure to be closely monitored
provider was notified on course of action and new reading of 178/90
provider was notified on course of action

## 2021-03-13 NOTE — PROGRESS NOTE ADULT - ASSESSMENT
56 t/o M L foot 5th dorsal metatarsal wound to bone  - pt seen and evaluated  - WBC 12.9  - left foot dorsal 5th met wound to bone, tracking up extensor tendon, 3+ edema, scant purulence, mild malodor, plantar blister, etiology: diabetic neuropathy   - s/p bedside I&D  - Consented for surgery in chart  - rec'd admit to medicine for IV ABx  - Pt refused surgery on friday, pt now agreeable to having surgery Sunday  - Pt to be ADDED ON TO OR TOMORROW FOR L FOOT PARTIAL 5TH RAY RESECTION  W/ DR Woodard  -please repeat COVID prior to OR  - NPO after midnight   - will follow  - discussed w/ attending

## 2021-03-13 NOTE — PROVIDER CONTACT NOTE (OTHER) - ACTION/TREATMENT ORDERED:
Give 6 units Admelog as per sliding scale.
Provider made aware. Ok to give one time dose of Norvasc 5mg PO. Will continue to monitor.
Provider made aware. One time dose of Norvasc 5mg PO to be ordered. Will continue to monitor.
provider ordered STat dose of hydralazine 10 mg PO. BP upon reassessment was 173/86
provider ordered STat dose of hydralazine 10 mg PO
provider requested manual reading

## 2021-03-13 NOTE — PROVIDER CONTACT NOTE (OTHER) - SITUATION
Provider was contacted that pt has elevated blood pressure. pt was made aware of the change provider asked for the value to be re-assessed with a manual monitor. provider ordered STAT dose
Patient's 12 AM 
Provider was contacted that pt has elevated blood pressure. pt was made aware of the change provider asked for the value to be re-assessed with a manual monitor.
pt blood pressure is elevated, all other vitals are stable. scheduled surgery for amputation.
Patient currently has BP of 160/75, ok to give 5mg PO Norvasc?
Patient has manual BP of 180/80
Provider was contacted that pt has elevated blood pressure. pt was made aware of the change provider asked for the value to be re-assessed with a manual monitor.

## 2021-03-13 NOTE — PROVIDER CONTACT NOTE (OTHER) - BACKGROUND
Patient admitted for hyperglycemia.
Patient admitted for hyperglycemia.
provider was notified that the systolic pressure was elevated
pt showed an elevated BP of 181/92
pt showed an elevated BP of 181/92, provider requested manual reading wich was 178/90
Patient admitted for hyperglycemia.
pt showed an elevated BP of 181/92, provider requested manual reading which was 178/90, on reassessment BP was 173/86.

## 2021-03-13 NOTE — PROVIDER CONTACT NOTE (OTHER) - ASSESSMENT
Patient has BP of 160/75, no s/s of acute distress noted. Denies headaches, blurry vision, dizziness.
Patient has manual BP of 180/80, no s/s of acute distress noted. Denies headaches, blurry vision, dizziness.
pt was asymptomatic , denies any pain no elevated HR denies chest pain denies headache and dizziness
Patient in no acute distress.
/76
pt was asymptomatic , denies any pain no elevated HR denies chest pain denies headache and dizziness
pt was asymptomatic , denies any pain no elevated HR denies chest pain denies headache and dizziness

## 2021-03-13 NOTE — PROVIDER CONTACT NOTE (OTHER) - REASON
blood pressure 179/76
pt has elevated blood pressure
12 AM 
pt has elevated blood pressure
pt has elevated blood pressure
Patient currently has BP of 160/75, ok to give 5mg PO Norvasc?
Patient has manual BP of 180/80

## 2021-03-14 ENCOUNTER — RESULT REVIEW (OUTPATIENT)
Age: 56
End: 2021-03-14

## 2021-03-14 LAB
ALBUMIN SERPL ELPH-MCNC: 2.1 G/DL — LOW (ref 3.3–5)
ALP SERPL-CCNC: 148 U/L — HIGH (ref 40–120)
ALT FLD-CCNC: 14 U/L — SIGNIFICANT CHANGE UP (ref 4–41)
ANION GAP SERPL CALC-SCNC: 10 MMOL/L — SIGNIFICANT CHANGE UP (ref 7–14)
APTT BLD: 30.4 SEC — SIGNIFICANT CHANGE UP (ref 27–36.3)
AST SERPL-CCNC: 15 U/L — SIGNIFICANT CHANGE UP (ref 4–40)
BASOPHILS # BLD AUTO: 0.04 K/UL — SIGNIFICANT CHANGE UP (ref 0–0.2)
BASOPHILS NFR BLD AUTO: 0.3 % — SIGNIFICANT CHANGE UP (ref 0–2)
BILIRUB SERPL-MCNC: 0.4 MG/DL — SIGNIFICANT CHANGE UP (ref 0.2–1.2)
BLD GP AB SCN SERPL QL: NEGATIVE — SIGNIFICANT CHANGE UP
BUN SERPL-MCNC: 19 MG/DL — SIGNIFICANT CHANGE UP (ref 7–23)
CALCIUM SERPL-MCNC: 8.7 MG/DL — SIGNIFICANT CHANGE UP (ref 8.4–10.5)
CHLORIDE SERPL-SCNC: 99 MMOL/L — SIGNIFICANT CHANGE UP (ref 98–107)
CO2 SERPL-SCNC: 25 MMOL/L — SIGNIFICANT CHANGE UP (ref 22–31)
CREAT SERPL-MCNC: 1.45 MG/DL — HIGH (ref 0.5–1.3)
EOSINOPHIL # BLD AUTO: 0.37 K/UL — SIGNIFICANT CHANGE UP (ref 0–0.5)
EOSINOPHIL NFR BLD AUTO: 2.7 % — SIGNIFICANT CHANGE UP (ref 0–6)
GLUCOSE BLDC GLUCOMTR-MCNC: 163 MG/DL — HIGH (ref 70–99)
GLUCOSE BLDC GLUCOMTR-MCNC: 180 MG/DL — HIGH (ref 70–99)
GLUCOSE BLDC GLUCOMTR-MCNC: 186 MG/DL — HIGH (ref 70–99)
GLUCOSE BLDC GLUCOMTR-MCNC: 212 MG/DL — HIGH (ref 70–99)
GLUCOSE BLDC GLUCOMTR-MCNC: 214 MG/DL — HIGH (ref 70–99)
GLUCOSE SERPL-MCNC: 211 MG/DL — HIGH (ref 70–99)
GRAM STN FLD: SIGNIFICANT CHANGE UP
HCT VFR BLD CALC: 22.8 % — LOW (ref 39–50)
HCT VFR BLD CALC: 23.7 % — LOW (ref 39–50)
HGB BLD-MCNC: 7.7 G/DL — LOW (ref 13–17)
HGB BLD-MCNC: 7.8 G/DL — LOW (ref 13–17)
IANC: 9.85 K/UL — HIGH (ref 1.5–8.5)
IMM GRANULOCYTES NFR BLD AUTO: 0.4 % — SIGNIFICANT CHANGE UP (ref 0–1.5)
INR BLD: 1.27 RATIO — HIGH (ref 0.88–1.16)
LYMPHOCYTES # BLD AUTO: 15.6 % — SIGNIFICANT CHANGE UP (ref 13–44)
LYMPHOCYTES # BLD AUTO: 2.12 K/UL — SIGNIFICANT CHANGE UP (ref 1–3.3)
MAGNESIUM SERPL-MCNC: 1.7 MG/DL — SIGNIFICANT CHANGE UP (ref 1.6–2.6)
MCHC RBC-ENTMCNC: 25.2 PG — LOW (ref 27–34)
MCHC RBC-ENTMCNC: 26.1 PG — LOW (ref 27–34)
MCHC RBC-ENTMCNC: 32.9 GM/DL — SIGNIFICANT CHANGE UP (ref 32–36)
MCHC RBC-ENTMCNC: 33.8 GM/DL — SIGNIFICANT CHANGE UP (ref 32–36)
MCV RBC AUTO: 76.7 FL — LOW (ref 80–100)
MCV RBC AUTO: 77.3 FL — LOW (ref 80–100)
MONOCYTES # BLD AUTO: 1.15 K/UL — HIGH (ref 0–0.9)
MONOCYTES NFR BLD AUTO: 8.5 % — SIGNIFICANT CHANGE UP (ref 2–14)
NEUTROPHILS # BLD AUTO: 9.85 K/UL — HIGH (ref 1.8–7.4)
NEUTROPHILS NFR BLD AUTO: 72.5 % — SIGNIFICANT CHANGE UP (ref 43–77)
NRBC # BLD: 0 /100 WBCS — SIGNIFICANT CHANGE UP
NRBC # BLD: 0 /100 WBCS — SIGNIFICANT CHANGE UP
NRBC # FLD: 0 K/UL — SIGNIFICANT CHANGE UP
NRBC # FLD: 0 K/UL — SIGNIFICANT CHANGE UP
PLATELET # BLD AUTO: 247 K/UL — SIGNIFICANT CHANGE UP (ref 150–400)
PLATELET # BLD AUTO: 249 K/UL — SIGNIFICANT CHANGE UP (ref 150–400)
POTASSIUM SERPL-MCNC: 4.1 MMOL/L — SIGNIFICANT CHANGE UP (ref 3.5–5.3)
POTASSIUM SERPL-SCNC: 4.1 MMOL/L — SIGNIFICANT CHANGE UP (ref 3.5–5.3)
PROT SERPL-MCNC: 7.5 G/DL — SIGNIFICANT CHANGE UP (ref 6–8.3)
PROTHROM AB SERPL-ACNC: 14.3 SEC — HIGH (ref 10.6–13.6)
RBC # BLD: 2.95 M/UL — LOW (ref 4.2–5.8)
RBC # BLD: 3.09 M/UL — LOW (ref 4.2–5.8)
RBC # FLD: 13.6 % — SIGNIFICANT CHANGE UP (ref 10.3–14.5)
RBC # FLD: 13.6 % — SIGNIFICANT CHANGE UP (ref 10.3–14.5)
RH IG SCN BLD-IMP: NEGATIVE — SIGNIFICANT CHANGE UP
SODIUM SERPL-SCNC: 134 MMOL/L — LOW (ref 135–145)
SPECIMEN SOURCE: SIGNIFICANT CHANGE UP
WBC # BLD: 13.59 K/UL — HIGH (ref 3.8–10.5)
WBC # BLD: 14.73 K/UL — HIGH (ref 3.8–10.5)
WBC # FLD AUTO: 13.59 K/UL — HIGH (ref 3.8–10.5)
WBC # FLD AUTO: 14.73 K/UL — HIGH (ref 3.8–10.5)

## 2021-03-14 PROCEDURE — 88305 TISSUE EXAM BY PATHOLOGIST: CPT | Mod: 26

## 2021-03-14 PROCEDURE — 73630 X-RAY EXAM OF FOOT: CPT | Mod: 26,LT

## 2021-03-14 PROCEDURE — 99232 SBSQ HOSP IP/OBS MODERATE 35: CPT

## 2021-03-14 PROCEDURE — 88311 DECALCIFY TISSUE: CPT | Mod: 26

## 2021-03-14 PROCEDURE — 99223 1ST HOSP IP/OBS HIGH 75: CPT

## 2021-03-14 RX ORDER — INSULIN LISPRO 100/ML
4 VIAL (ML) SUBCUTANEOUS
Refills: 0 | Status: DISCONTINUED | OUTPATIENT
Start: 2021-03-14 | End: 2021-03-19

## 2021-03-14 RX ORDER — ONDANSETRON 8 MG/1
4 TABLET, FILM COATED ORAL ONCE
Refills: 0 | Status: DISCONTINUED | OUTPATIENT
Start: 2021-03-14 | End: 2021-03-14

## 2021-03-14 RX ORDER — INSULIN LISPRO 100/ML
VIAL (ML) SUBCUTANEOUS
Refills: 0 | Status: DISCONTINUED | OUTPATIENT
Start: 2021-03-14 | End: 2021-03-19

## 2021-03-14 RX ORDER — INSULIN LISPRO 100/ML
VIAL (ML) SUBCUTANEOUS AT BEDTIME
Refills: 0 | Status: DISCONTINUED | OUTPATIENT
Start: 2021-03-14 | End: 2021-03-19

## 2021-03-14 RX ORDER — OXYCODONE AND ACETAMINOPHEN 5; 325 MG/1; MG/1
1 TABLET ORAL EVERY 4 HOURS
Refills: 0 | Status: DISCONTINUED | OUTPATIENT
Start: 2021-03-14 | End: 2021-03-18

## 2021-03-14 RX ORDER — ACETAMINOPHEN 500 MG
650 TABLET ORAL EVERY 6 HOURS
Refills: 0 | Status: DISCONTINUED | OUTPATIENT
Start: 2021-03-14 | End: 2021-03-19

## 2021-03-14 RX ORDER — FENTANYL CITRATE 50 UG/ML
25 INJECTION INTRAVENOUS
Refills: 0 | Status: DISCONTINUED | OUTPATIENT
Start: 2021-03-14 | End: 2021-03-14

## 2021-03-14 RX ORDER — SODIUM CHLORIDE 9 MG/ML
1000 INJECTION, SOLUTION INTRAVENOUS
Refills: 0 | Status: DISCONTINUED | OUTPATIENT
Start: 2021-03-14 | End: 2021-03-19

## 2021-03-14 RX ORDER — HYDROMORPHONE HYDROCHLORIDE 2 MG/ML
0.5 INJECTION INTRAMUSCULAR; INTRAVENOUS; SUBCUTANEOUS EVERY 4 HOURS
Refills: 0 | Status: DISCONTINUED | OUTPATIENT
Start: 2021-03-14 | End: 2021-03-17

## 2021-03-14 RX ORDER — INSULIN GLARGINE 100 [IU]/ML
18 INJECTION, SOLUTION SUBCUTANEOUS AT BEDTIME
Refills: 0 | Status: DISCONTINUED | OUTPATIENT
Start: 2021-03-14 | End: 2021-03-19

## 2021-03-14 RX ADMIN — OXYCODONE AND ACETAMINOPHEN 1 TABLET(S): 5; 325 TABLET ORAL at 19:02

## 2021-03-14 RX ADMIN — OXYCODONE AND ACETAMINOPHEN 1 TABLET(S): 5; 325 TABLET ORAL at 23:54

## 2021-03-14 RX ADMIN — Medication 2: at 05:43

## 2021-03-14 RX ADMIN — AMLODIPINE BESYLATE 5 MILLIGRAM(S): 2.5 TABLET ORAL at 05:29

## 2021-03-14 RX ADMIN — Medication 1: at 17:49

## 2021-03-14 RX ADMIN — PIPERACILLIN AND TAZOBACTAM 25 GRAM(S): 4; .5 INJECTION, POWDER, LYOPHILIZED, FOR SOLUTION INTRAVENOUS at 21:34

## 2021-03-14 RX ADMIN — PIPERACILLIN AND TAZOBACTAM 25 GRAM(S): 4; .5 INJECTION, POWDER, LYOPHILIZED, FOR SOLUTION INTRAVENOUS at 05:29

## 2021-03-14 RX ADMIN — Medication 4 UNIT(S): at 17:49

## 2021-03-14 RX ADMIN — PIPERACILLIN AND TAZOBACTAM 25 GRAM(S): 4; .5 INJECTION, POWDER, LYOPHILIZED, FOR SOLUTION INTRAVENOUS at 14:45

## 2021-03-14 RX ADMIN — Medication 10 MILLIGRAM(S): at 14:55

## 2021-03-14 RX ADMIN — INSULIN GLARGINE 18 UNIT(S): 100 INJECTION, SOLUTION SUBCUTANEOUS at 22:12

## 2021-03-14 RX ADMIN — OXYCODONE AND ACETAMINOPHEN 1 TABLET(S): 5; 325 TABLET ORAL at 18:09

## 2021-03-14 RX ADMIN — Medication 1: at 13:12

## 2021-03-14 RX ADMIN — Medication 10 MILLIGRAM(S): at 21:35

## 2021-03-14 RX ADMIN — Medication 10 MILLIGRAM(S): at 05:29

## 2021-03-14 NOTE — PROGRESS NOTE ADULT - ASSESSMENT
Pt is scheduled for Left foot partial 5th ray amputation with Dr. Woodard at 9am. Patient is aware of procedure and is NPO since midnight.  CXR on sunrise.  EKG on sunrise.  Medical/Cardiac clearance since 3/11and documented in chart.  Consent signed and in chart.  Procedure was explained to patient in detail. All alternatives, risks and complications were discussed. All questions answered.

## 2021-03-14 NOTE — PROGRESS NOTE ADULT - ASSESSMENT
56/M with PMH DM (HbA1c 12.2).  P/w worsening lt foot pain, swelling discharge X 1 week. No systemic symptoms, prior trauma  In ED, afebrile with elevated BP. Labs revealed WBC 14,900, Hb 8.2, BUN 34, Cr 1.53, hyperglycemic.   Eval by Podiatry in ER - left foot dorsal 5th met wound to bone, tracking up extensor tendon, 3+ edema, scant purulence, mild malodor, plantar blister. I&D performed, was planned for Lt foot partial 5th ray resection 3/12, but pt refused  ID consulted for abx recs  Given extensive nature of toe involvement + poorly controlled DM I am not certain medical therapy alone will be curative  Have d/w pt-he in any case has reconsidered and d/w podiatry and is scheduled for toe resection today  Rec:  A) Toe cellulitis/OM  follow Cx  surgical plan per podiatry  continue zosyn  Tailor per results and course  duration will depend on margins and wound healing    B) DM  optimized DM control to promote wound healing  consider endocrine consult    Will tailor plan for ID issues  per course,results.Will defer to primary team on management of other issues.  Assessment, plan and recommendations as detailed above were discussed with the medical/primary  team.  Will Follow.  Beeper [phone] [facility] [id number].   Wknd/afterhours/No response-[phone] or Fellow on call .

## 2021-03-14 NOTE — CONSULT NOTE ADULT - ASSESSMENT
57 y/o male with severely uncontrolled T2DM (A1c 12.2%) complicated with neuropathy, retinopathy and now s/p partial ray amputation of left leg. Also with hypertension and HLD    T2DM  - Recommend cons carb diet  - Recommend Lantus 15 units QHS  - Recommend Admelog 3 units TID AC  - Recommend Admelog low SSI AC and HS  - Recommend insulin pen teaching by RN as a refresher   Outpatient Plan  - Basal-bolus insulin  - F/u [address] [facility], call [phone] for apt    Hypertension  - BP has significantly improved at this time  - Continue Hydralazine and Amlodipine  - Goal BP <140/80    Diabetic Foot Ulcer  - We had an extensive discussion regarding importance of glucose control at home  - Recommend tight glycemic control inpatient with follow-up outpatient for surgical site healing    R/o HLD  - Check fasting lipid in AM    Vladimir Beyer DO  Pager: [phone] 55 y/o male with severely uncontrolled T2DM (A1c 12.2%) complicated with neuropathy, retinopathy and now s/p partial ray amputation of left leg. Also with hypertension and HLD    T2DM  - Recommend cons carb diet  - Recommend Lantus 18 units QHS  - Recommend Admelog 4 units TID AC  - Recommend Admelog low SSI AC and HS  - Recommend insulin pen teaching by RN as a refresher   Outpatient Plan  - Basal-bolus insulin  - F/u [address] [facility], call [phone] for apt    Hypertension  - BP has significantly improved at this time  - Continue Hydralazine and Amlodipine  - Goal BP <140/80    Diabetic Foot Ulcer  - We had an extensive discussion regarding importance of glucose control at home  - Recommend tight glycemic control inpatient with follow-up outpatient for surgical site healing    R/o HLD  - Check fasting lipid in AM    Vladimir Beyer DO  Pager: [phone]

## 2021-03-14 NOTE — BRIEF OPERATIVE NOTE - OPERATION/FINDINGS
There was 10cc of purulence noted intra-op, there is high concern for residual bone infection and low concern for viability.

## 2021-03-14 NOTE — BRIEF OPERATIVE NOTE - SPECIMENS
LF deep wound culture, clean bone margin from the left 5th MT to pathology and microbiology, left 5th dirty toe to pathology

## 2021-03-14 NOTE — BRIEF OPERATIVE NOTE - COMMENTS
There is high concern for residual soft tissue infection, patient may need secondary procedure/ grafting.

## 2021-03-14 NOTE — CONSULT NOTE ADULT - ASSESSMENT
EKG - NSR     A/P     1) HTN - increase norvasc to 10mg daily cont hydralalzine     2) Left foot 5th toe infection - s/p amputation doing well     3) DM2 - on insulin

## 2021-03-14 NOTE — BRIEF OPERATIVE NOTE - NSICDXBRIEFPROCEDURE_GEN_ALL_CORE_FT
PROCEDURES:  Partial amputation of fifth ray of left foot by open approach 14-Mar-2021 14:10:55  Cirilo Cohen  Open biopsy of bone of foot 14-Mar-2021 14:10:42  Cirilo Cohen

## 2021-03-14 NOTE — CONSULT NOTE ADULT - SUBJECTIVE AND OBJECTIVE BOX
Reason For Consult: T2DM    HPI: 55yo M hx of HTN, diabetes presented with cc of left foot infection 1 week. Patient reported continue to ambulate without significant pain. Swelling is worsening with redness/warmth around the area. Patient was seen by PCP earlier today at a routine follow up and was advised to come to ED for further eval. Patient otherwise reported baseline functional status and able to ambulate two flight of stairs without sob or cp. Patient was noted hyperglycemic in the ED 600s and hypertensive 180s systolic. Given regular insulin 6u. (11 Mar 2021 19:44)    Pt is s/p Left foot partial 5th ray amputation    Endocrine History:      PAST MEDICAL & SURGICAL HISTORY:  Diabetes    No significant past surgical history        FAMILY HISTORY:  No pertinent family history in first degree relatives        Social History:    Outpatient Medications: Glimepiride 2 mg BID    MEDICATIONS  (STANDING):  amLODIPine   Tablet 5 milliGRAM(s) Oral daily  dextrose 40% Gel 15 Gram(s) Oral once  dextrose 5%. 1000 milliLiter(s) (50 mL/Hr) IV Continuous <Continuous>  dextrose 5%. 1000 milliLiter(s) (100 mL/Hr) IV Continuous <Continuous>  dextrose 50% Injectable 25 Gram(s) IV Push once  dextrose 50% Injectable 12.5 Gram(s) IV Push once  dextrose 50% Injectable 25 Gram(s) IV Push once  glucagon  Injectable 1 milliGRAM(s) IntraMuscular once  hydrALAZINE 10 milliGRAM(s) Oral three times a day  insulin glargine Injectable (LANTUS) 15 Unit(s) SubCutaneous at bedtime  insulin lispro (ADMELOG) corrective regimen sliding scale   SubCutaneous every 6 hours  lactated ringers. 1000 milliLiter(s) (75 mL/Hr) IV Continuous <Continuous>  piperacillin/tazobactam IVPB.. 3.375 Gram(s) IV Intermittent every 8 hours    MEDICATIONS  (PRN):  acetaminophen   Tablet .. 650 milliGRAM(s) Oral every 6 hours PRN Mild Pain (1 - 3)  fentaNYL    Injectable 25 MICROGram(s) IV Push every 5 minutes PRN Moderate Pain (4 - 6)  HYDROmorphone  Injectable 0.5 milliGRAM(s) IV Push every 4 hours PRN Severe Pain (7 - 10)  ondansetron Injectable 4 milliGRAM(s) IV Push once PRN Nausea and/or Vomiting  oxycodone    5 mG/acetaminophen 325 mG 1 Tablet(s) Oral every 4 hours PRN Moderate Pain (4 - 6)      Allergies  No Known Drug Allergies  shellfish (Angioedema)    Review of Systems:  Constitutional: No fever  Eyes: No blurry vision  Neuro: No tremors  HEENT: No pain  Cardiovascular: No chest pain, palpitations  Respiratory: No SOB, no cough  GI: No nausea, vomiting, abdominal pain  : No dysuria  Skin: no rash  Psych: no depression  Endocrine: no polyuria, polydipsia  Hem/lymph: no swelling  Osteoporosis: no fractures    ALL OTHER SYSTEMS REVIEWED AND NEGATIVE    UNABLE TO OBTAIN    PHYSICAL EXAM:  VITALS: T(C): 37 (03-14-21 @ 11:00)  T(F): 98.6 (03-14-21 @ 11:00), Max: 98.8 (03-14-21 @ 10:25)  HR: 70 (03-14-21 @ 11:15) (68 - 82)  BP: 127/63 (03-14-21 @ 11:15) (127/63 - 181/92)  RR:  (13 - 17)  SpO2:  (94% - 100%)  Wt(kg): 142.9 kg    GENERAL: NAD, well-groomed, well-developed  EYES: No proptosis, no lid lag, anicteric  HEENT:  Atraumatic, Normocephalic, moist mucous membranes  THYROID: Normal size, no palpable nodules  RESPIRATORY: Clear to auscultation bilaterally; No rales, rhonchi, wheezing, or rubs  CARDIOVASCULAR: Regular rate and rhythm; No murmurs; no peripheral edema  GI: Soft, nontender, non distended, normal bowel sounds  SKIN: Dry, intact, No rashes or lesions  MUSCULOSKELETAL: Full range of motion, normal strength  NEURO: sensation intact, extraocular movements intact, no tremor, normal reflexes  PSYCH: Alert and oriented x 3, normal affect, normal mood  CUSHING'S SIGNS: no striae    POCT Blood Glucose.: 212 mg/dL (03-14-21 @ 08:04)  POCT Blood Glucose.: 214 mg/dL (03-14-21 @ 05:36) A2    POCT Blood Glucose.: 214 mg/dL (03-13-21 @ 23:01) Lantus 15, A2  POCT Blood Glucose.: 288 mg/dL (03-13-21 @ 17:33) A3  POCT Blood Glucose.: 203 mg/dL (03-13-21 @ 12:15) A2  POCT Blood Glucose.: 183 mg/dL (03-13-21 @ 06:18) A1    POCT Blood Glucose.: 209 mg/dL (03-12-21 @ 22:10) Lantus 15  POCT Blood Glucose.: 215 mg/dL (03-12-21 @ 16:55)  POCT Blood Glucose.: 218 mg/dL (03-12-21 @ 12:11)  POCT Blood Glucose.: 249 mg/dL (03-12-21 @ 06:16)  POCT Blood Glucose.: 419 mg/dL (03-12-21 @ 00:31)    POCT Blood Glucose.: 478 mg/dL (03-11-21 @ 19:40) Lantus 15  POCT Blood Glucose.: 586 mg/dL (03-11-21 @ 15:27)                            7.7    13.59 )-----------( 249      ( 14 Mar 2021 09:36 )             22.8       03-14    134<L>  |  99  |  19  ----------------------------<  211<H>  4.1   |  25  |  1.45<H>    EGFR if : 61  EGFR if non : 53<L>    Ca    8.7      03-14  Mg     1.7     03-14  Phos  3.3     03-13    TPro  7.5  /  Alb  2.1<L>  /  TBili  0.4  /  DBili  x   /  AST  15  /  ALT  14  /  AlkPhos  148<H>  03-14                   Reason For Consult: T2DM    HPI: 57yo M hx of HTN, diabetes presented with cc of left foot infection 1 week. Patient reported continue to ambulate without significant pain. Swelling is worsening with redness/warmth around the area. Patient was seen by PCP earlier today at a routine follow up and was advised to come to ED for further eval. Patient otherwise reported baseline functional status and able to ambulate two flight of stairs without sob or cp. Patient was noted hyperglycemic in the ED 600s and hypertensive 180s systolic. Given regular insulin 6u. (11 Mar 2021 19:44)    Pt is s/p Left foot partial 5th ray amputation    Endocrine History:  T2DM x 10 years. Admits to neuropathy, retinopathy (gets laser eye surgery, last visit was Jan 2021). No known nephropathy. No h/o CAD or CVA  Admits to using insulin pens in the past, cannot recall name or times of the day. Discontinued pens maybe 3 years ago  Admits that he was taking Januvia in the past, stopped it maybe 1 year ago  States about 5 days ago, ran out of Metformin and has not been on any medications. He does not know med name and thinks its Metformin  Glimepiride listed in his MAR but when asked about it, he states not taking or does not really know his medication name.  Does not check FS, has not checked FS in months  When asked about his routine, states "he helps out at night" and does not disclose his line of work. States he is awake all night usually and sleeps during the day  He mostly has eggs and meat for breakfast and then salad & meat for dinner. States he does not snack through the night and asleep during the day  States he is not on any other medications at home, not on anti-hypertensives or lipid agents  NO tobacco or alcohol or soda or juice intake      PAST MEDICAL & SURGICAL HISTORY:  Diabetes    No significant past surgical history    FAMILY HISTORY:  No pertinent family history in first degree relatives  NO DM in family per patient    Social History: NO tobacco or alcohol or soda or juice intake    Outpatient Medications: Glimepiride 2 mg BID    MEDICATIONS  (STANDING):  amLODIPine   Tablet 5 milliGRAM(s) Oral daily  dextrose 40% Gel 15 Gram(s) Oral once  dextrose 5%. 1000 milliLiter(s) (50 mL/Hr) IV Continuous <Continuous>  dextrose 5%. 1000 milliLiter(s) (100 mL/Hr) IV Continuous <Continuous>  dextrose 50% Injectable 25 Gram(s) IV Push once  dextrose 50% Injectable 12.5 Gram(s) IV Push once  dextrose 50% Injectable 25 Gram(s) IV Push once  glucagon  Injectable 1 milliGRAM(s) IntraMuscular once  hydrALAZINE 10 milliGRAM(s) Oral three times a day  insulin glargine Injectable (LANTUS) 15 Unit(s) SubCutaneous at bedtime  insulin lispro (ADMELOG) corrective regimen sliding scale   SubCutaneous every 6 hours  lactated ringers. 1000 milliLiter(s) (75 mL/Hr) IV Continuous <Continuous>  piperacillin/tazobactam IVPB.. 3.375 Gram(s) IV Intermittent every 8 hours    MEDICATIONS  (PRN):  acetaminophen   Tablet .. 650 milliGRAM(s) Oral every 6 hours PRN Mild Pain (1 - 3)  fentaNYL    Injectable 25 MICROGram(s) IV Push every 5 minutes PRN Moderate Pain (4 - 6)  HYDROmorphone  Injectable 0.5 milliGRAM(s) IV Push every 4 hours PRN Severe Pain (7 - 10)  ondansetron Injectable 4 milliGRAM(s) IV Push once PRN Nausea and/or Vomiting  oxycodone    5 mG/acetaminophen 325 mG 1 Tablet(s) Oral every 4 hours PRN Moderate Pain (4 - 6)      Allergies  No Known Drug Allergies  shellfish (Angioedema)    Review of Systems:  Constitutional: No fever  Eyes: + blurry vision  Neuro: No tremors  HEENT: No pain  Cardiovascular: No chest pain, palpitations  Respiratory: No SOB, no cough  GI: No nausea, vomiting, abdominal pain  : No dysuria  Skin: no rash  Endocrine: + polyuria, polydipsia  Hem/lymph: + leg swelling    ALL OTHER SYSTEMS REVIEWED AND NEGATIVE      PHYSICAL EXAM:  VITALS: T(C): 37 (03-14-21 @ 11:00)  T(F): 98.6 (03-14-21 @ 11:00), Max: 98.8 (03-14-21 @ 10:25)  HR: 70 (03-14-21 @ 11:15) (68 - 82)  BP: 127/63 (03-14-21 @ 11:15) (127/63 - 181/92)  RR:  (13 - 17)  SpO2:  (94% - 100%)  Wt(kg): 142.9 kg    GENERAL: NAD, well-groomed, well-developed, obese male  EYES: No proptosis, no lid lag, anicteric  HEENT:  Atraumatic, Normocephalic, moist mucous membranes  THYROID: Normal size, no palpable nodules  RESPIRATORY: Clear to auscultation bilaterally; No rales, rhonchi, wheezing, or rubs  CARDIOVASCULAR: Regular rate and rhythm; No murmurs; no peripheral edema, left foot in ace wrap  GI: Soft, nontender, non distended, normal bowel sounds  SKIN: Dry, intact, No rashes or lesions  MUSCULOSKELETAL: Limited range of motion, normal strength  NEURO: sensation intact, extraocular movements intact, no tremor  PSYCH: Alert and oriented x 3, normal affect, normal mood  CUSHING'S SIGNS: no striae    POCT Blood Glucose.: 212 mg/dL (03-14-21 @ 08:04)  POCT Blood Glucose.: 214 mg/dL (03-14-21 @ 05:36) A2    POCT Blood Glucose.: 214 mg/dL (03-13-21 @ 23:01) Lantus 15, A2  POCT Blood Glucose.: 288 mg/dL (03-13-21 @ 17:33) A3  POCT Blood Glucose.: 203 mg/dL (03-13-21 @ 12:15) A2  POCT Blood Glucose.: 183 mg/dL (03-13-21 @ 06:18) A1    POCT Blood Glucose.: 209 mg/dL (03-12-21 @ 22:10) Lantus 15  POCT Blood Glucose.: 215 mg/dL (03-12-21 @ 16:55)  POCT Blood Glucose.: 218 mg/dL (03-12-21 @ 12:11)  POCT Blood Glucose.: 249 mg/dL (03-12-21 @ 06:16)  POCT Blood Glucose.: 419 mg/dL (03-12-21 @ 00:31)    POCT Blood Glucose.: 478 mg/dL (03-11-21 @ 19:40) Lantus 15  POCT Blood Glucose.: 586 mg/dL (03-11-21 @ 15:27)                            7.7    13.59 )-----------( 249      ( 14 Mar 2021 09:36 )             22.8       03-14    134<L>  |  99  |  19  ----------------------------<  211<H>  4.1   |  25  |  1.45<H>    EGFR if : 61  EGFR if non : 53<L>    Ca    8.7      03-14  Mg     1.7     03-14  Phos  3.3     03-13    TPro  7.5  /  Alb  2.1<L>  /  TBili  0.4  /  DBili  x   /  AST  15  /  ALT  14  /  AlkPhos  148<H>  03-14

## 2021-03-15 LAB
ALBUMIN SERPL ELPH-MCNC: 2.5 G/DL — LOW (ref 3.3–5)
ALP SERPL-CCNC: 127 U/L — HIGH (ref 40–120)
ALT FLD-CCNC: 13 U/L — SIGNIFICANT CHANGE UP (ref 4–41)
ANION GAP SERPL CALC-SCNC: 6 MMOL/L — LOW (ref 7–14)
AST SERPL-CCNC: 11 U/L — SIGNIFICANT CHANGE UP (ref 4–40)
BILIRUB SERPL-MCNC: 0.3 MG/DL — SIGNIFICANT CHANGE UP (ref 0.2–1.2)
BUN SERPL-MCNC: 22 MG/DL — SIGNIFICANT CHANGE UP (ref 7–23)
CALCIUM SERPL-MCNC: 8.4 MG/DL — SIGNIFICANT CHANGE UP (ref 8.4–10.5)
CHLORIDE SERPL-SCNC: 103 MMOL/L — SIGNIFICANT CHANGE UP (ref 98–107)
CO2 SERPL-SCNC: 29 MMOL/L — SIGNIFICANT CHANGE UP (ref 22–31)
CREAT SERPL-MCNC: 1.77 MG/DL — HIGH (ref 0.5–1.3)
GLUCOSE BLDC GLUCOMTR-MCNC: 117 MG/DL — HIGH (ref 70–99)
GLUCOSE BLDC GLUCOMTR-MCNC: 144 MG/DL — HIGH (ref 70–99)
GLUCOSE BLDC GLUCOMTR-MCNC: 147 MG/DL — HIGH (ref 70–99)
GLUCOSE BLDC GLUCOMTR-MCNC: 177 MG/DL — HIGH (ref 70–99)
GLUCOSE SERPL-MCNC: 183 MG/DL — HIGH (ref 70–99)
HCT VFR BLD CALC: 22.5 % — LOW (ref 39–50)
HGB BLD-MCNC: 7.6 G/DL — LOW (ref 13–17)
MAGNESIUM SERPL-MCNC: 1.9 MG/DL — SIGNIFICANT CHANGE UP (ref 1.6–2.6)
MCHC RBC-ENTMCNC: 26 PG — LOW (ref 27–34)
MCHC RBC-ENTMCNC: 33.8 GM/DL — SIGNIFICANT CHANGE UP (ref 32–36)
MCV RBC AUTO: 77.1 FL — LOW (ref 80–100)
NRBC # BLD: 0 /100 WBCS — SIGNIFICANT CHANGE UP
NRBC # FLD: 0 K/UL — SIGNIFICANT CHANGE UP
PHOSPHATE SERPL-MCNC: 3.7 MG/DL — SIGNIFICANT CHANGE UP (ref 2.5–4.5)
PLATELET # BLD AUTO: 227 K/UL — SIGNIFICANT CHANGE UP (ref 150–400)
POTASSIUM SERPL-MCNC: 4.2 MMOL/L — SIGNIFICANT CHANGE UP (ref 3.5–5.3)
POTASSIUM SERPL-SCNC: 4.2 MMOL/L — SIGNIFICANT CHANGE UP (ref 3.5–5.3)
PROT SERPL-MCNC: 7.5 G/DL — SIGNIFICANT CHANGE UP (ref 6–8.3)
RBC # BLD: 2.92 M/UL — LOW (ref 4.2–5.8)
RBC # FLD: 14 % — SIGNIFICANT CHANGE UP (ref 10.3–14.5)
SODIUM SERPL-SCNC: 138 MMOL/L — SIGNIFICANT CHANGE UP (ref 135–145)
WBC # BLD: 12.31 K/UL — HIGH (ref 3.8–10.5)
WBC # FLD AUTO: 12.31 K/UL — HIGH (ref 3.8–10.5)

## 2021-03-15 PROCEDURE — 99232 SBSQ HOSP IP/OBS MODERATE 35: CPT

## 2021-03-15 RX ORDER — AMLODIPINE BESYLATE 2.5 MG/1
10 TABLET ORAL DAILY
Refills: 0 | Status: DISCONTINUED | OUTPATIENT
Start: 2021-03-16 | End: 2021-03-19

## 2021-03-15 RX ORDER — SODIUM CHLORIDE 9 MG/ML
1000 INJECTION, SOLUTION INTRAVENOUS
Refills: 0 | Status: DISCONTINUED | OUTPATIENT
Start: 2021-03-15 | End: 2021-03-16

## 2021-03-15 RX ORDER — POLYETHYLENE GLYCOL 3350 17 G/17G
17 POWDER, FOR SOLUTION ORAL DAILY
Refills: 0 | Status: DISCONTINUED | OUTPATIENT
Start: 2021-03-15 | End: 2021-03-19

## 2021-03-15 RX ORDER — SENNA PLUS 8.6 MG/1
2 TABLET ORAL AT BEDTIME
Refills: 0 | Status: DISCONTINUED | OUTPATIENT
Start: 2021-03-15 | End: 2021-03-19

## 2021-03-15 RX ADMIN — SENNA PLUS 2 TABLET(S): 8.6 TABLET ORAL at 21:18

## 2021-03-15 RX ADMIN — Medication 10 MILLIGRAM(S): at 15:08

## 2021-03-15 RX ADMIN — PIPERACILLIN AND TAZOBACTAM 25 GRAM(S): 4; .5 INJECTION, POWDER, LYOPHILIZED, FOR SOLUTION INTRAVENOUS at 15:08

## 2021-03-15 RX ADMIN — SODIUM CHLORIDE 75 MILLILITER(S): 9 INJECTION, SOLUTION INTRAVENOUS at 10:25

## 2021-03-15 RX ADMIN — PIPERACILLIN AND TAZOBACTAM 25 GRAM(S): 4; .5 INJECTION, POWDER, LYOPHILIZED, FOR SOLUTION INTRAVENOUS at 21:18

## 2021-03-15 RX ADMIN — Medication 1: at 08:35

## 2021-03-15 RX ADMIN — Medication 4 UNIT(S): at 12:18

## 2021-03-15 RX ADMIN — AMLODIPINE BESYLATE 5 MILLIGRAM(S): 2.5 TABLET ORAL at 05:13

## 2021-03-15 RX ADMIN — INSULIN GLARGINE 18 UNIT(S): 100 INJECTION, SOLUTION SUBCUTANEOUS at 21:19

## 2021-03-15 RX ADMIN — Medication 4 UNIT(S): at 08:35

## 2021-03-15 RX ADMIN — Medication 4 UNIT(S): at 17:00

## 2021-03-15 RX ADMIN — Medication 10 MILLIGRAM(S): at 05:13

## 2021-03-15 RX ADMIN — Medication 10 MILLIGRAM(S): at 21:18

## 2021-03-15 RX ADMIN — OXYCODONE AND ACETAMINOPHEN 1 TABLET(S): 5; 325 TABLET ORAL at 00:30

## 2021-03-15 RX ADMIN — PIPERACILLIN AND TAZOBACTAM 25 GRAM(S): 4; .5 INJECTION, POWDER, LYOPHILIZED, FOR SOLUTION INTRAVENOUS at 05:13

## 2021-03-15 NOTE — PROGRESS NOTE ADULT - ASSESSMENT
57 y/o s/p LF partial 5th ray resection left open DOS 3/14/21  - pt seen and evaluated  - WBC 13.59  - Pod 1 3/15: s/p LF partial 5th ray resection left open, surgical wound is fibrogranular, no drainage noted, no pus, no surrounding erythema, no malodor, no acute signs of infection. Wound probes laterally 2cmm.   - Continue IV antibiotics  - Pod plan for RTOR for possible graft or possible wound vac  - discussed w/ attending  57 y/o s/p LF partial 5th ray resection left open DOS 3/14/21  - pt seen and evaluated  - WBC 13.59  - Pod 1 3/15: s/p LF partial 5th ray resection left open, surgical wound is fibrogranular, no drainage noted, no pus, no surrounding erythema, no malodor, no acute signs of infection. Wound probes laterally 2cmm.   - Continue IV antibiotics  - ordered L foot MR to r/o OM 4th metatarsal head   - Pod plan for either wound VAC vs RTOR for 4th ray resection/ graft pending MR results   - discussed w/ attending  57 y/o s/p LF partial 5th ray resection left open DOS 3/14/21  - pt seen and evaluated  - WBC 13.59  - Pod 1 3/15: s/p LF partial 5th ray resection left open, surgical wound is fibrogranular, no drainage noted, no pus, no surrounding erythema, no malodor, no acute signs of infection. Wound probes laterally 2cmm.   - Continue IV antibiotics  -Site flushed and packed  - ordered L foot MR to r/o OM 4th metatarsal head   - Pod plan for either wound VAC vs RTOR for 4th ray resection/ graft pending MR results   - Seen w/ attending

## 2021-03-15 NOTE — PROGRESS NOTE ADULT - ASSESSMENT
56/M with PMH DM (HbA1c 12.2).  P/w worsening lt foot pain, swelling discharge X 1 week. No systemic symptoms, prior trauma  In ED, afebrile with elevated BP. Labs revealed WBC 14,900, Hb 8.2, BUN 34, Cr 1.53, hyperglycemic.   Eval by Podiatry in ER - left foot dorsal 5th met wound to bone, tracking up extensor tendon, 3+ edema, scant purulence, mild malodor, plantar blister. I&D performed, was planned for Lt foot partial 5th ray resection 3/12, but pt refused  ID consulted for abx recs  s/p ray resection-high concern for residual OM  Cx as above   Rec:  A) Toe cellulitis/OM  follow OR cx   Further surgical plan per podiatry  continue zosyn  will Tailor per results and course  duration will depend on margins and wound healing    B) DM  optimized DM control to promote wound healing      Will tailor plan for ID issues  per course,results.Will defer to primary team on management of other issues.    Will Follow.  Beeper [phone] [facility] [id number].   Wknd/afterhours/No response-[phone] or Fellow on call .

## 2021-03-16 LAB
-  AMIKACIN: SIGNIFICANT CHANGE UP
-  AMOXICILLIN/CLAVULANIC ACID: SIGNIFICANT CHANGE UP
-  AMPICILLIN/SULBACTAM: SIGNIFICANT CHANGE UP
-  AMPICILLIN: SIGNIFICANT CHANGE UP
-  AZTREONAM: SIGNIFICANT CHANGE UP
-  CEFAZOLIN: SIGNIFICANT CHANGE UP
-  CEFEPIME: SIGNIFICANT CHANGE UP
-  CEFOXITIN: SIGNIFICANT CHANGE UP
-  CEFTRIAXONE: SIGNIFICANT CHANGE UP
-  CIPROFLOXACIN: SIGNIFICANT CHANGE UP
-  ERTAPENEM: SIGNIFICANT CHANGE UP
-  GENTAMICIN: SIGNIFICANT CHANGE UP
-  IMIPENEM: SIGNIFICANT CHANGE UP
-  LEVOFLOXACIN: SIGNIFICANT CHANGE UP
-  MEROPENEM: SIGNIFICANT CHANGE UP
-  PIPERACILLIN/TAZOBACTAM: SIGNIFICANT CHANGE UP
-  TOBRAMYCIN: SIGNIFICANT CHANGE UP
-  TRIMETHOPRIM/SULFAMETHOXAZOLE: SIGNIFICANT CHANGE UP
ALBUMIN SERPL ELPH-MCNC: 2.5 G/DL — LOW (ref 3.3–5)
ALP SERPL-CCNC: 131 U/L — HIGH (ref 40–120)
ALT FLD-CCNC: 15 U/L — SIGNIFICANT CHANGE UP (ref 4–41)
ANION GAP SERPL CALC-SCNC: 7 MMOL/L — SIGNIFICANT CHANGE UP (ref 7–14)
AST SERPL-CCNC: 13 U/L — SIGNIFICANT CHANGE UP (ref 4–40)
BILIRUB SERPL-MCNC: 0.2 MG/DL — SIGNIFICANT CHANGE UP (ref 0.2–1.2)
BUN SERPL-MCNC: 20 MG/DL — SIGNIFICANT CHANGE UP (ref 7–23)
CALCIUM SERPL-MCNC: 8.6 MG/DL — SIGNIFICANT CHANGE UP (ref 8.4–10.5)
CHLORIDE SERPL-SCNC: 103 MMOL/L — SIGNIFICANT CHANGE UP (ref 98–107)
CHOLEST SERPL-MCNC: 114 MG/DL — SIGNIFICANT CHANGE UP
CO2 SERPL-SCNC: 29 MMOL/L — SIGNIFICANT CHANGE UP (ref 22–31)
CREAT SERPL-MCNC: 1.6 MG/DL — HIGH (ref 0.5–1.3)
CULTURE RESULTS: SIGNIFICANT CHANGE UP
CULTURE RESULTS: SIGNIFICANT CHANGE UP
GLUCOSE BLDC GLUCOMTR-MCNC: 149 MG/DL — HIGH (ref 70–99)
GLUCOSE BLDC GLUCOMTR-MCNC: 157 MG/DL — HIGH (ref 70–99)
GLUCOSE BLDC GLUCOMTR-MCNC: 182 MG/DL — HIGH (ref 70–99)
GLUCOSE BLDC GLUCOMTR-MCNC: 221 MG/DL — HIGH (ref 70–99)
GLUCOSE SERPL-MCNC: 140 MG/DL — HIGH (ref 70–99)
HCT VFR BLD CALC: 21.6 % — LOW (ref 39–50)
HDLC SERPL-MCNC: 29 MG/DL — LOW
HGB BLD-MCNC: 7.2 G/DL — LOW (ref 13–17)
LIPID PNL WITH DIRECT LDL SERPL: 73 MG/DL — SIGNIFICANT CHANGE UP
MAGNESIUM SERPL-MCNC: 1.9 MG/DL — SIGNIFICANT CHANGE UP (ref 1.6–2.6)
MCHC RBC-ENTMCNC: 26 PG — LOW (ref 27–34)
MCHC RBC-ENTMCNC: 33.3 GM/DL — SIGNIFICANT CHANGE UP (ref 32–36)
MCV RBC AUTO: 78 FL — LOW (ref 80–100)
METHOD TYPE: SIGNIFICANT CHANGE UP
NON HDL CHOLESTEROL: 85 MG/DL — SIGNIFICANT CHANGE UP
NRBC # BLD: 0 /100 WBCS — SIGNIFICANT CHANGE UP
NRBC # FLD: 0 K/UL — SIGNIFICANT CHANGE UP
ORGANISM # SPEC MICROSCOPIC CNT: SIGNIFICANT CHANGE UP
PHOSPHATE SERPL-MCNC: 3.4 MG/DL — SIGNIFICANT CHANGE UP (ref 2.5–4.5)
PLATELET # BLD AUTO: 251 K/UL — SIGNIFICANT CHANGE UP (ref 150–400)
POTASSIUM SERPL-MCNC: 3.9 MMOL/L — SIGNIFICANT CHANGE UP (ref 3.5–5.3)
POTASSIUM SERPL-SCNC: 3.9 MMOL/L — SIGNIFICANT CHANGE UP (ref 3.5–5.3)
PROT SERPL-MCNC: 7.6 G/DL — SIGNIFICANT CHANGE UP (ref 6–8.3)
RBC # BLD: 2.77 M/UL — LOW (ref 4.2–5.8)
RBC # FLD: 14.2 % — SIGNIFICANT CHANGE UP (ref 10.3–14.5)
SODIUM SERPL-SCNC: 139 MMOL/L — SIGNIFICANT CHANGE UP (ref 135–145)
SPECIMEN SOURCE: SIGNIFICANT CHANGE UP
SPECIMEN SOURCE: SIGNIFICANT CHANGE UP
TRIGL SERPL-MCNC: 61 MG/DL — SIGNIFICANT CHANGE UP
WBC # BLD: 10.79 K/UL — HIGH (ref 3.8–10.5)
WBC # FLD AUTO: 10.79 K/UL — HIGH (ref 3.8–10.5)

## 2021-03-16 PROCEDURE — 73718 MRI LOWER EXTREMITY W/O DYE: CPT | Mod: 26,LT

## 2021-03-16 PROCEDURE — 99232 SBSQ HOSP IP/OBS MODERATE 35: CPT

## 2021-03-16 RX ORDER — SODIUM CHLORIDE 9 MG/ML
1000 INJECTION, SOLUTION INTRAVENOUS
Refills: 0 | Status: DISCONTINUED | OUTPATIENT
Start: 2021-03-16 | End: 2021-03-19

## 2021-03-16 RX ORDER — COLLAGENASE CLOSTRIDIUM HIST. 250 UNIT/G
1 OINTMENT (GRAM) TOPICAL DAILY
Refills: 0 | Status: DISCONTINUED | OUTPATIENT
Start: 2021-03-16 | End: 2021-03-19

## 2021-03-16 RX ORDER — CARVEDILOL PHOSPHATE 80 MG/1
3.12 CAPSULE, EXTENDED RELEASE ORAL EVERY 12 HOURS
Refills: 0 | Status: DISCONTINUED | OUTPATIENT
Start: 2021-03-16 | End: 2021-03-17

## 2021-03-16 RX ADMIN — OXYCODONE AND ACETAMINOPHEN 1 TABLET(S): 5; 325 TABLET ORAL at 00:44

## 2021-03-16 RX ADMIN — Medication 10 MILLIGRAM(S): at 05:49

## 2021-03-16 RX ADMIN — CARVEDILOL PHOSPHATE 3.12 MILLIGRAM(S): 80 CAPSULE, EXTENDED RELEASE ORAL at 17:45

## 2021-03-16 RX ADMIN — PIPERACILLIN AND TAZOBACTAM 25 GRAM(S): 4; .5 INJECTION, POWDER, LYOPHILIZED, FOR SOLUTION INTRAVENOUS at 05:49

## 2021-03-16 RX ADMIN — INSULIN GLARGINE 18 UNIT(S): 100 INJECTION, SOLUTION SUBCUTANEOUS at 22:41

## 2021-03-16 RX ADMIN — Medication 2: at 17:44

## 2021-03-16 RX ADMIN — PIPERACILLIN AND TAZOBACTAM 25 GRAM(S): 4; .5 INJECTION, POWDER, LYOPHILIZED, FOR SOLUTION INTRAVENOUS at 13:50

## 2021-03-16 RX ADMIN — Medication 4 UNIT(S): at 08:29

## 2021-03-16 RX ADMIN — Medication 4 UNIT(S): at 17:43

## 2021-03-16 RX ADMIN — Medication 10 MILLIGRAM(S): at 13:50

## 2021-03-16 RX ADMIN — Medication 1: at 11:59

## 2021-03-16 RX ADMIN — AMLODIPINE BESYLATE 10 MILLIGRAM(S): 2.5 TABLET ORAL at 05:48

## 2021-03-16 RX ADMIN — OXYCODONE AND ACETAMINOPHEN 1 TABLET(S): 5; 325 TABLET ORAL at 00:07

## 2021-03-16 RX ADMIN — Medication 1: at 08:29

## 2021-03-16 RX ADMIN — Medication 4 UNIT(S): at 11:59

## 2021-03-16 RX ADMIN — SODIUM CHLORIDE 75 MILLILITER(S): 9 INJECTION, SOLUTION INTRAVENOUS at 10:05

## 2021-03-16 RX ADMIN — PIPERACILLIN AND TAZOBACTAM 25 GRAM(S): 4; .5 INJECTION, POWDER, LYOPHILIZED, FOR SOLUTION INTRAVENOUS at 21:04

## 2021-03-16 NOTE — CHART NOTE - NSCHARTNOTEFT_GEN_A_CORE
ACP MEDICINE COVERAGE - Medicine Subsequent Hospital Care Note    CC: CARRIE, anemia  HPI/Subjective: Patient is A&O x4, denies fever, chills, diaphoresis, generalized weakness, headache, changes in vision, cough, wheezing, hemoptysis, chest pain, palpitations, shortness of breath, nausea, vomiting, diarrhea, constipation, melena, hematochezia, dysuria, denies dysuria, numbness/weakness/tingling, any other complaints. Patient reports pain with foot movement     Vital Signs Last 24 Hrs  T(C): 36.7 (03-16-21 @ 05:46), Max: 37.1 (03-15-21 @ 21:14)  T(F): 98.1 (03-16-21 @ 05:46), Max: 98.7 (03-15-21 @ 21:14)  HR: 70 (03-16-21 @ 05:46) (69 - 80)  BP: 149/71 (03-16-21 @ 05:46) (135/64 - 164/71)  RR: 16 (03-16-21 @ 05:46) (16 - 17)  SpO2: 98% (03-16-21 @ 05:46) (97% - 98%) on (O2)    PHYSICAL EXAM:  Constitutional: NAD, well-developed, well-nourished, A&O x4  Ears, nose, Mouth, and Throat: normal external ears and nose, normal hearing, moist oral mucosa  Eyes: normal conjunctiva, EOMI, PEERLA  Respiratory: Clear to auscultation bilaterally. No wheezes, rales or rhonchi. Normal respiratory effort  Cardiovascular: regular rate and rhythm, S1 and S2, no murmurs  GI: abdomen soft, nontender, nondistented, +BS  Neurologic: sensation grossly intact, CN grossly intact, non-focal exam  Skin/Musculoskeletal: no clubbing, no cyanosis, no joint swelling, Left foot Dressing C/D/I    LABS:                        7.2    10.79 )-----------( 251      ( 16 Mar 2021 08:10 )             21.6     03-16    139  |  103  |  20  ----------------------------<  140<H>  3.9   |  29  |  1.60<H>    Ca    8.6      16 Mar 2021 08:10  Phos  3.4     03-16  Mg     1.9     03-16    TPro  7.6  /  Alb  2.5<L>  /  TBili  0.2  /  DBili  x   /  AST  13  /  ALT  15  /  AlkPhos  131<H>  03-16    PT/INR: PT: 14.3 sec | INR: 1.27 ratio (03-14-21 @ 07:35)  PTT: 30.4 sec (03-14-21 @ 07:35)  PT/INR: PT: 15.6 sec | INR: 1.39 ratio (03-13-21 @ 14:30)  PTT: x (03-13-21 @ 14:30)  PT/INR: PT: 14.2 sec | INR: 1.26 ratio (03-11-21 @ 17:54)  PTT: 31.6 sec (03-11-21 @ 17:54)    CAPILLARY BLOOD GLUCOSE:  POCT Blood Glucose: 157 mg/dL (03-16-21 @ 08:17)  POCT Blood Glucose: 144 mg/dL (03-15-21 @ 21:15)  POCT Blood Glucose: 147 mg/dL (03-15-21 @ 16:55)    COVID PCR:  NotDetec (03-13-21 @ 14:10)  NotDetec (03-11-21 @ 18:27)    I reviewed the above labs, radiology, medications, tests.    ASSESSMENT/PLAN:  55yo M hx of HTN, HLD, severely uncontrolled T2DM (A1c 12.2%) complicated with neuropathy, retinopathy presented with cc of left foot infection 1 week. s/p left 5th partial ray resection on 3/14, dressing C/D/I. Now with noted CARRIE and anemia.    S/P left 5th partial ray resection on 3/14  - Left foot dressing C/D/I  - Pain control with PO percocet and IV dilaudid, bowel regimen  - Per podiatry, pending MRI Left foot, called MRI tech to expedite, patient is on schedule for later afternoon  - Pending MR results, podiatry plan for either wound vac or OR  - Discussed culture results with ID Dr. Pollard-> continue with Zosyn for now    Anemia  - discussed above results and case with Dr. Rodriguez, monitor CBC, transfuse if Hgb <7  - iron studies ordered  - stool occult ordered    CARRIE  - gentle IVF for now  - encourage PO intake  - will monitor BMP    - Clinical findings, labs, tests, and diagnostics reviewed with attending, RN and patient. Will monitor patient closely.     medium complexity/ risk (45 min)
Pt no longer wants to have Left foot partial 5th ray resection today. Podiatry aware and at bedside. Pt wishes to treat with antibiotics for now. Risks of not doing procedure explained in detail by podiatry. Pt verbalized understanding. Will call infectious disease for antibiotic recommendations and continue to monitor
Patient is refusing surgery at this time. Patient would like to opt for antibiotics at this time as his primary treatment option. He understands the risks of delaying/ opting out of surgery, including worsening left foot infection and possible limb loss. Discussed the risks of delaying surgery w/ patient at length with Dr. Magana present, patient showed understanding of these risks.     Recommend ID consult at this time.

## 2021-03-16 NOTE — PHYSICAL THERAPY INITIAL EVALUATION ADULT - ASR EQUIP NEEDS DISCH PT EVAL
Patient given straight cane, educated on proper use, measured to proper height. Patient demonstrated understanding

## 2021-03-16 NOTE — PROGRESS NOTE ADULT - ASSESSMENT
57 y/o s/p LF partial 5th ray resection left open DOS 3/14/21    - pt seen and evaluated  - WBC trending down to 10  - Pod 1 3/15: s/p LF partial 5th ray resection left open, surgical wound is fibrogranular, no drainage noted, no pus, no surrounding erythema, no malodor, no acute signs of infection. Wound probes laterally 2cm  - Continue IV antibiotics  - awaiting L foot MR to r/o OM 4th metatarsal head   - VAC to be applied tomorrow   - Pod plan for either wound VAC vs RTOR for 4th ray resection/ graft pending MR results   - Discussed w/ attending

## 2021-03-16 NOTE — PROGRESS NOTE ADULT - ASSESSMENT
Assessment and Plan    55yo M hx of HTN, diabetes presented with cc of left foot infection 1 week    EKG - NSR     1) HTN   -BP remains elevated  - continue with norvasc 10mg daily  -will d/c hydralazine and start coreg 3.125mg BID    2) Left foot 5th toe infection   - s/p amputation doing well     3) DM2   - on insulin

## 2021-03-16 NOTE — PROGRESS NOTE ADULT - ASSESSMENT
56/M with PMH DM (HbA1c 12.2).  P/w worsening lt foot pain, swelling discharge X 1 week. No systemic symptoms, prior trauma  In ED, afebrile with elevated BP. Labs revealed WBC 14,900, Hb 8.2, BUN 34, Cr 1.53, hyperglycemic.   Eval by Podiatry in ER - left foot dorsal 5th met wound to bone, tracking up extensor tendon, 3+ edema, scant purulence, mild malodor, plantar blister. I&D performed, was planned for Lt foot partial 5th ray resection 3/12, but pt refused  ID consulted for abx recs  s/p ray resection-high concern for residual OM  Cx as above   Rec:  A) Toe cellulitis/OM  follow OR cx   sup cx polymicrobial with strep and kleb-sensis as above  Further surgical plan per podiatry  continue zosyn for now  will Tailor per results and course  Options including PICC vs po d/w patient.He is reluctant for PICC-given levaquins ensis-levaquin + po augmentin(or keflex) may be options-will decide per OR cx and pt preference after podiatry surgical interventions done  duration will depend on margins and wound healing  Monitor renal function-tailor doses per renal function    B) DM  optimized DM control to promote wound healing      Will tailor plan for ID issues  per course,results.Will defer to primary team on management of other issues.  Assessment, plan and recommendations as detailed above were discussed with the medical/primary  team.  I am away tomorrow.My Colleagues in ID service will cover -please call ID fellow on call using Web-exchange if any issues or questions.

## 2021-03-16 NOTE — PHYSICAL THERAPY INITIAL EVALUATION ADULT - PATIENT PROFILE REVIEW, REHAB EVAL
paged podiatry at [phone] to confirm weightbearing orders. As per Viridiana Cote WBAT to heel in Darco shoe/yes

## 2021-03-16 NOTE — PHYSICAL THERAPY INITIAL EVALUATION ADULT - PERTINENT HX OF CURRENT PROBLEM, REHAB EVAL
Patient is a 56 year old male admitted for diabetic foot ulcer, now s/p Left foot partial 5th ray resection. PMH listed below

## 2021-03-17 ENCOUNTER — TRANSCRIPTION ENCOUNTER (OUTPATIENT)
Age: 56
End: 2021-03-17

## 2021-03-17 DIAGNOSIS — E78.5 HYPERLIPIDEMIA, UNSPECIFIED: ICD-10-CM

## 2021-03-17 LAB
ALBUMIN SERPL ELPH-MCNC: 2.3 G/DL — LOW (ref 3.3–5)
ALP SERPL-CCNC: 117 U/L — SIGNIFICANT CHANGE UP (ref 40–120)
ALT FLD-CCNC: 14 U/L — SIGNIFICANT CHANGE UP (ref 4–41)
ANION GAP SERPL CALC-SCNC: 9 MMOL/L — SIGNIFICANT CHANGE UP (ref 7–14)
AST SERPL-CCNC: 21 U/L — SIGNIFICANT CHANGE UP (ref 4–40)
BILIRUB SERPL-MCNC: 0.3 MG/DL — SIGNIFICANT CHANGE UP (ref 0.2–1.2)
BLD GP AB SCN SERPL QL: NEGATIVE — SIGNIFICANT CHANGE UP
BUN SERPL-MCNC: 18 MG/DL — SIGNIFICANT CHANGE UP (ref 7–23)
CALCIUM SERPL-MCNC: 8.7 MG/DL — SIGNIFICANT CHANGE UP (ref 8.4–10.5)
CHLORIDE SERPL-SCNC: 104 MMOL/L — SIGNIFICANT CHANGE UP (ref 98–107)
CO2 SERPL-SCNC: 26 MMOL/L — SIGNIFICANT CHANGE UP (ref 22–31)
CREAT SERPL-MCNC: 1.45 MG/DL — HIGH (ref 0.5–1.3)
CULTURE RESULTS: SIGNIFICANT CHANGE UP
CULTURE RESULTS: SIGNIFICANT CHANGE UP
FERRITIN SERPL-MCNC: 504 NG/ML — HIGH (ref 30–400)
FOLATE SERPL-MCNC: 12.4 NG/ML — SIGNIFICANT CHANGE UP (ref 3.1–17.5)
GLUCOSE BLDC GLUCOMTR-MCNC: 120 MG/DL — HIGH (ref 70–99)
GLUCOSE BLDC GLUCOMTR-MCNC: 125 MG/DL — HIGH (ref 70–99)
GLUCOSE BLDC GLUCOMTR-MCNC: 148 MG/DL — HIGH (ref 70–99)
GLUCOSE BLDC GLUCOMTR-MCNC: 178 MG/DL — HIGH (ref 70–99)
GLUCOSE SERPL-MCNC: 125 MG/DL — HIGH (ref 70–99)
HCT VFR BLD CALC: 23 % — LOW (ref 39–50)
HGB BLD-MCNC: 7.7 G/DL — LOW (ref 13–17)
IRON SATN MFR SERPL: 36 % — SIGNIFICANT CHANGE UP (ref 14–50)
IRON SATN MFR SERPL: 43 UG/DL — LOW (ref 45–165)
MAGNESIUM SERPL-MCNC: 1.9 MG/DL — SIGNIFICANT CHANGE UP (ref 1.6–2.6)
MCHC RBC-ENTMCNC: 25.9 PG — LOW (ref 27–34)
MCHC RBC-ENTMCNC: 33.5 GM/DL — SIGNIFICANT CHANGE UP (ref 32–36)
MCV RBC AUTO: 77.4 FL — LOW (ref 80–100)
NRBC # BLD: 0 /100 WBCS — SIGNIFICANT CHANGE UP
NRBC # FLD: 0 K/UL — SIGNIFICANT CHANGE UP
PHOSPHATE SERPL-MCNC: 3 MG/DL — SIGNIFICANT CHANGE UP (ref 2.5–4.5)
PLATELET # BLD AUTO: 288 K/UL — SIGNIFICANT CHANGE UP (ref 150–400)
POTASSIUM SERPL-MCNC: 3.8 MMOL/L — SIGNIFICANT CHANGE UP (ref 3.5–5.3)
POTASSIUM SERPL-SCNC: 3.8 MMOL/L — SIGNIFICANT CHANGE UP (ref 3.5–5.3)
PROT SERPL-MCNC: 7.5 G/DL — SIGNIFICANT CHANGE UP (ref 6–8.3)
RBC # BLD: 2.97 M/UL — LOW (ref 4.2–5.8)
RBC # FLD: 14.2 % — SIGNIFICANT CHANGE UP (ref 10.3–14.5)
RH IG SCN BLD-IMP: NEGATIVE — SIGNIFICANT CHANGE UP
SODIUM SERPL-SCNC: 139 MMOL/L — SIGNIFICANT CHANGE UP (ref 135–145)
SPECIMEN SOURCE: SIGNIFICANT CHANGE UP
SPECIMEN SOURCE: SIGNIFICANT CHANGE UP
TIBC SERPL-MCNC: 121 UG/DL — LOW (ref 220–430)
UIBC SERPL-MCNC: 78 UG/DL — LOW (ref 110–370)
WBC # BLD: 10.68 K/UL — HIGH (ref 3.8–10.5)
WBC # FLD AUTO: 10.68 K/UL — HIGH (ref 3.8–10.5)

## 2021-03-17 PROCEDURE — 99232 SBSQ HOSP IP/OBS MODERATE 35: CPT

## 2021-03-17 PROCEDURE — 99233 SBSQ HOSP IP/OBS HIGH 50: CPT

## 2021-03-17 RX ORDER — CARVEDILOL PHOSPHATE 80 MG/1
6.25 CAPSULE, EXTENDED RELEASE ORAL EVERY 12 HOURS
Refills: 0 | Status: DISCONTINUED | OUTPATIENT
Start: 2021-03-17 | End: 2021-03-19

## 2021-03-17 RX ADMIN — PIPERACILLIN AND TAZOBACTAM 25 GRAM(S): 4; .5 INJECTION, POWDER, LYOPHILIZED, FOR SOLUTION INTRAVENOUS at 06:06

## 2021-03-17 RX ADMIN — INSULIN GLARGINE 18 UNIT(S): 100 INJECTION, SOLUTION SUBCUTANEOUS at 22:34

## 2021-03-17 RX ADMIN — CARVEDILOL PHOSPHATE 6.25 MILLIGRAM(S): 80 CAPSULE, EXTENDED RELEASE ORAL at 17:46

## 2021-03-17 RX ADMIN — PIPERACILLIN AND TAZOBACTAM 25 GRAM(S): 4; .5 INJECTION, POWDER, LYOPHILIZED, FOR SOLUTION INTRAVENOUS at 14:54

## 2021-03-17 RX ADMIN — Medication 4 UNIT(S): at 12:23

## 2021-03-17 RX ADMIN — Medication 1 APPLICATION(S): at 12:24

## 2021-03-17 RX ADMIN — Medication 4 UNIT(S): at 08:34

## 2021-03-17 RX ADMIN — CARVEDILOL PHOSPHATE 3.12 MILLIGRAM(S): 80 CAPSULE, EXTENDED RELEASE ORAL at 06:07

## 2021-03-17 RX ADMIN — AMLODIPINE BESYLATE 10 MILLIGRAM(S): 2.5 TABLET ORAL at 06:07

## 2021-03-17 RX ADMIN — Medication 4 UNIT(S): at 17:45

## 2021-03-17 RX ADMIN — PIPERACILLIN AND TAZOBACTAM 25 GRAM(S): 4; .5 INJECTION, POWDER, LYOPHILIZED, FOR SOLUTION INTRAVENOUS at 22:35

## 2021-03-17 NOTE — DIETITIAN INITIAL EVALUATION ADULT. - PERTINENT LABORATORY DATA
03-17 Na 139 mmol/L Glu 125 mg/dL<H> K+ 3.8 mmol/L Cr 1.45 mg/dL<H> BUN 18 mg/dL Phos 3.0 mg/dL  03-17 @ 08:20 POCT 125 mg/dL  03-16 @ 22:10 POCT 149 mg/dL  03-16 @ 17:03 POCT 221 mg/dL

## 2021-03-17 NOTE — DISCHARGE NOTE PROVIDER - NSDCMRMEDTOKEN_GEN_ALL_CORE_FT
GLIMEPIRIDE 2 MG TABLET: TAKE 2 TABLETS BY MOUTH TWICE A DAY   Acidophilus Probiotic Blend oral capsule: 1 cap(s) orally once a day   amLODIPine 10 mg oral tablet: 1 tab(s) orally once a day  Augmentin 875 mg-125 mg oral tablet: 1 tab(s) orally every 12 hours   carvedilol 12.5 mg oral tablet: 1 tab(s) orally every 12 hours  free style strips, FX 4 x day, E11.65: 1 each subcutaneous 4 times a day   freestyle glucometer, E 11.65: 1 each subcutaneous 4 times a day   freestyle lancets, FS 4 x day, E 11.65: 1 each subcutaneous 4 times a day   Lantus Solostar Pen 100 units/mL subcutaneous solution: 18 unit(s) subcutaneous once a day (at bedtime)   oxycodone-acetaminophen 5 mg-325 mg oral tablet: 1 tab(s) orally 1 to 3 times a day, As Needed  for pain MDD:3  pen needles, dx E 11.65, 4 x daily injection : 1 each subcutaneous 4 times a day   polyethylene glycol 3350 oral powder for reconstitution: 17 gram(s) orally once a day  senna oral tablet: 2 tab(s) orally once a day (at bedtime)   Acidophilus Probiotic Blend oral capsule: 1 cap(s) orally once a day   Admelog SoloStar 100 units/mL injectable solution: 4 unit(s) injectable 3 times a day   amLODIPine 10 mg oral tablet: 1 tab(s) orally once a day  Augmentin 875 mg-125 mg oral tablet: 1 tab(s) orally every 12 hours   carvedilol 12.5 mg oral tablet: 1 tab(s) orally every 12 hours  free style strips, FX 4 x day, E11.65: 1 each subcutaneous 4 times a day   freestyle glucometer, E 11.65: 1 each subcutaneous 4 times a day   freestyle lancets, FS 4 x day, E 11.65: 1 each subcutaneous 4 times a day   Lantus Solostar Pen 100 units/mL subcutaneous solution: 18 unit(s) subcutaneous once a day (at bedtime)   oxycodone-acetaminophen 5 mg-325 mg oral tablet: 1 tab(s) orally 1 to 3 times a day, As Needed  for pain MDD:3  pen needles, dx E 11.65, 4 x daily injection : 1 each subcutaneous 4 times a day   polyethylene glycol 3350 oral powder for reconstitution: 17 gram(s) orally once a day  senna oral tablet: 2 tab(s) orally once a day (at bedtime)

## 2021-03-17 NOTE — DISCHARGE NOTE PROVIDER - HOSPITAL COURSE
57yo M hx of HTN, HLD, severely uncontrolled T2DM (A1c 12.2%) complicated with neuropathy, retinopathy presented with cc of left foot infection 1 week.    Diabetic foot ulcer  - c/w abx Zosyn  - blood cultures- NTD  - Abscess wound cx 3/11-showing Strep. agalactiae, Rare K. pneumoniae  - Podiatry consulted- s/p left 5th partial ray resection, 3/14/21 w/ Dr Woodard, clean bone culture with GBS/Streptococcus agalactiae; pending culture, and OR path--------  - pain control with PO percocet, bowel regimen   - MR Left foot 3/16- mild marrow edema, early infection cannot be excluded; no OM 4th metatarsal. Dorsolateral subcutaneous fluid collection 1x0.8cm at 5th metatarsel stump- collection such as seroma or hematoma vs small abscess  - wound vac placed on 3/17-manage wound vac at 125mmHg to be changed three times a week w/ combo foam.  - FU with Podiatry Dr. Woodard within 1 week of discharge,[phone]    - Wound Care: Please manage wound vac at 125mmHg to be changed three times a week w/ combo foam.    Type 2 diabetes mellitus with hyperglycemia, without long-term current use of insulin  - HgA1C -12.2%. Monitor FSG and cover with SSI.  - Endo consulted- Lantus 18 units QHS, Admelog 4 units TID AC  Outpatient Plan- Basal-bolus insulin  - F/u [address] [facility], [phone], Follow up with endocrinologist Dr. Claros [address] [facility], [address]nd Floor, [facility] [address], [phone]; Emailed for APPT---------  - RD/diabetic specialist education done, Outpatient follow up with RD/CDE.    Essential hypertension/HLD  - Patient not on bp meds. However, bp remains elevated during previous and this admission. Goal BP <140/80  - c/w Amlodipine increased to 10mg qd, Hydralazine d/savi on 3/16 by cards  - started coreg 3.125mg BID on 3/16-> increased 6.25mg BID  - Cardiology consulted   - EKG NSR   - FLP wnl     CARRIE on CKD/hyponatremia   - IVF  - monitor BMP    Anemia  - pt states baseline Hgb 7-8  - monitor CBC closely, transfuse if <7  - iron studies consistent with chronic disease, FOBT--    Dispo: home with services    On_________, case was discussed with Dr. Rodriguze, patient is medically cleared and optimized for discharge today. All medications were reviewed with attending, and sent to mutually agreed upon pharmacy.     57yo M hx of HTN, HLD, severely uncontrolled T2DM (A1c 12.2%) complicated with neuropathy, retinopathy presented with cc of left foot infection 1 week.    Diabetic foot ulcer  - c/w abx Zosyn  - blood cultures- NTD  - Abscess wound cx 3/11-showing Strep. agalactiae, Rare K. pneumoniae  - Podiatry consulted- s/p left 5th partial ray resection, 3/14/21 w/ Dr Woodard, clean bone culture with GBS/Streptococcus agalactiae; pending culture, and OR path--------  - pain control with PO percocet, bowel regimen   - MR Left foot 3/16- mild marrow edema, early infection cannot be excluded; no OM 4th metatarsal. Dorsolateral subcutaneous fluid collection 1x0.8cm at 5th metatarsel stump- collection such as seroma or hematoma vs small abscess  - wound vac placed on 3/17-manage wound vac at 125mmHg to be changed three times a week w/ combo foam.  - FU with Podiatry Dr. Woodard within 1 week of discharge,[phone]    - Wound Care: Please manage wound vac at 125mmHg to be changed three times a week w/ combo foam.    Type 2 diabetes mellitus with hyperglycemia  - HgA1C -12.2%. Monitor FSG and cover with SSI.  - Endo consulted- Lantus 18 units QHS, Admelog 4 units TID AC  Outpatient Plan- Basal-bolus insulin  - F/u [address] [facility], [phone], Follow up with endocrinologist Dr. Claros [address] [facility], [address] Floor, [place] [address], [phone];   - RD/diabetic specialist education done, Outpatient follow up with RD/CDE.    Essential hypertension/HLD  - Patient not on bp meds. However, bp remains elevated during previous and this admission. Goal BP <140/80  - c/w Amlodipine increased to 10mg qd, Hydralazine d/savi on 3/16 by cards  - started coreg 3.125mg BID on 3/16-> increased 6.25mg BID  - Cardiology consulted   - EKG NSR   - FLP wnl     CARRIE on CKD/hyponatremia   - IVF  - monitor BMP    Anemia  - pt states baseline Hgb 7-8  - monitor CBC closely, transfuse if <7  - iron studies consistent with chronic disease,   Dispo: home with services    On 3/19, case was discussed with Dr. Rodriguez, patient is medically cleared and optimized for discharge today. All medications were reviewed with attending, and sent to mutually agreed upon pharmacy.     57yo M hx of HTN, HLD, severely uncontrolled T2DM (A1c 12.2%) complicated with neuropathy, retinopathy presented with cc of left foot infection 1 week.    Diabetic foot ulcer  - c/w abx Zosyn  - blood cultures- NTD  - Abscess wound cx 3/11-showing Strep. agalactiae, Rare K. pneumoniae  - Podiatry consulted- s/p left 5th partial ray resection, 3/14/21 w/ Dr Woodard, clean bone culture with GBS/Streptococcus agalactiae  - pain control with PO percocet, bowel regimen   - MR Left foot 3/16- mild marrow edema, early infection cannot be excluded; no OM 4th metatarsal. Dorsolateral subcutaneous fluid collection 1x0.8cm at 5th metatarsel stump- collection such as seroma or hematoma vs small abscess  - wound vac placed on 3/17-manage wound vac at 125mmHg to be changed three times a week w/ combo foam.  - FU with Podiatry Dr. Woodard within 1 week of discharge,[phone]    - Wound Care: Please manage wound vac at 125mmHg to be changed three times a week w/ combo foam.    Type 2 diabetes mellitus with hyperglycemia  - HgA1C -12.2%. Monitor FSG and cover with SSI.  - Endo consulted- Lantus 18 units QHS, Admelog 4 units TID AC  Outpatient Plan- Basal-bolus insulin  - F/u [address] [facility], [phone], Follow up with endocrinologist Dr. Claros [address] [facility], [address]nd Floor, [facility] [address], [phone];   - RD/diabetic specialist education done, Outpatient follow up with RD/CDE.    Essential hypertension/HLD  - Patient not on bp meds. However, bp remains elevated during previous and this admission. Goal BP <140/80  - c/w Amlodipine increased to 10mg qd, Hydralazine d/savi on 3/16 by cards  - started coreg 3.125mg BID on 3/16-> increased 6.25mg BID  - Cardiology consulted   - EKG NSR   - FLP wnl     CARRIE on CKD/hyponatremia   - IVF  - monitor BMP    Anemia  - pt states baseline Hgb 7-8  - monitor CBC closely, transfuse if <7  - iron studies consistent with chronic disease,   Dispo: home with services    On 3/19, case was discussed with Dr. Rodriguez, patient is medically cleared and optimized for discharge today. All medications were reviewed with attending, and sent to mutually agreed upon pharmacy.

## 2021-03-17 NOTE — PROGRESS NOTE ADULT - ASSESSMENT
55 y/o male with severely uncontrolled T2DM (A1c 12.2%) complicated with neuropathy, retinopathy and now s/p partial ray amputation of left leg. Also with hypertension and HLD     57 y/o male with severely uncontrolled T2DM (A1c 12.2%) complicated with neuropathy, retinopathy and now s/p partial ray amputation of left leg. Also with hypertension and HLD    1. Uncontrolled DM 2 with hyperglycemia  Patient with severely uncontrolled DM 2, A1c 12.2%, goal less than 7%  Previously on insulin pens, most recently stopped all meds for DM    While inpatient:  BG target 100-180 mg/dl  Glucose trending within target, no hypoglycemia  Continue Lantus 18 units SQ qHS  Continue Admelog 4 units SQ TID before meals (Hold if NPO/not eating meal)  Continue Admelog LOW dose correctional scale before meals and bedtime  Consistent carbohydrate diet, RD consult  DM Education: Patient endorses comfort with use of insulin pens/glucometer. Please continue to review all DM education while admitted    Discharge Plan:  Basal/bolus insulin PENS with dose TBD  Please check which insulin pens are covered by insurance. Lantus Solostar PEN (or alternative Basaglar, Tresiba, Toujeo, Levemir) and Admelog Solostar PEN (or alternative Humalog, Novolog, Apidra, Fiasp)  Please send prescriptions for supplies on discharge including glucometer, glucose test strips, lancets, alcohol swabs and insulin PEN needles   Followup with endocrinologist Dr. Claros [address] [facility], [address] [address], [phone]  *Emailed office staff to arrange appt, will update note with date once obtained    2. HTN  BP target less than 130/80  Management per primary team    3. HLD  LDL target less than 70  LDL resulted 73  Consider statin    Jeimy Lindsay  Nurse Practitioner  Division of Endocrinology & Diabetes  In house pager #[phone]/long range pager #[phone]    If before 9AM or after 6PM, or on weekends/holidays, please call endocrine answering service for assistance ([phone]).  For nonurgent matters email [email or web address] for assistance.    55 y/o male with severely uncontrolled T2DM (A1c 12.2%) complicated with neuropathy, retinopathy and now s/p partial ray amputation of left leg. Also with hypertension and HLD    1. Uncontrolled DM 2 with hyperglycemia  Patient with severely uncontrolled DM 2, A1c 12.2%, goal less than 7%  Previously on insulin pens, most recently stopped all meds for DM    While inpatient:  BG target 100-180 mg/dl  Glucose trending within target, no hypoglycemia  Continue Lantus 18 units SQ qHS  Continue Admelog 4 units SQ TID before meals (Hold if NPO/not eating meal)  Continue Admelog LOW dose correctional scale before meals and bedtime  Consistent carbohydrate diet, RD consult  DM Education: Patient endorses comfort with use of insulin pens/glucometer. Please continue to review all DM education while admitted    Discharge Plan:  Basal/bolus insulin PENS with dose TBD  Please check which insulin pens are covered by insurance. Lantus Solostar PEN (or alternative Basaglar, Tresiba, Toujeo, Levemir) and Admelog Solostar PEN (or alternative Humalog, Novolog, Apidra, Fiasp)  Please send prescriptions for supplies on discharge including glucometer, glucose test strips, lancets, alcohol swabs and insulin PEN needles   Followup with endocrinologist Dr. Claros [address] [facility], [address] [place] [address], [phone]  *Emailed office staff to arrange appt, will update note with date once obtained    2. HTN  BP target less than 130/80  Management per primary team    3. HLD  LDL target less than 70  LDL resulted 73  Consider statin    Greater than 35 minutes spent in assessment, coordination of care and education  Jeimy Lindsay  Nurse Practitioner  Division of Endocrinology & Diabetes  In house pager #[phone]/long range pager #[phone]    If before 9AM or after 6PM, or on weekends/holidays, please call endocrine answering service for assistance ([phone]).  For nonurgent matters email [email or web address] for assistance.

## 2021-03-17 NOTE — DIETITIAN INITIAL EVALUATION ADULT. - OTHER INFO
Pt reports good appetite with completion of >75% of meals. Denies any GI issues (nausea/vomiting/diarrhea/constipation.) Denies any chewing or swallowing difficulties at this time. Confirmed allergy to shellfish.  pounds. Unable to state any weight changes.     Provided pt with handout Type 2 Diabetes Nutrition Therapy. Discussed carbohydrate sources, carbohydrate portions, protein sources, mixed meals, and nutrition label reading. Discussed current A1c, goal A1c, and goal fingersticks. Focused on importance of balancing meals and eating regularly (suggested snack in between breakfast and dinner).

## 2021-03-17 NOTE — DIETITIAN INITIAL EVALUATION ADULT. - PERTINENT MEDS FT
MEDICATIONS  (STANDING):  amLODIPine   Tablet 10 milliGRAM(s) Oral daily  carvedilol 6.25 milliGRAM(s) Oral every 12 hours  collagenase Ointment 1 Application(s) Topical daily  dextrose 40% Gel 15 Gram(s) Oral once  dextrose 5%. 1000 milliLiter(s) (50 mL/Hr) IV Continuous <Continuous>  dextrose 5%. 1000 milliLiter(s) (100 mL/Hr) IV Continuous <Continuous>  dextrose 50% Injectable 25 Gram(s) IV Push once  dextrose 50% Injectable 12.5 Gram(s) IV Push once  dextrose 50% Injectable 25 Gram(s) IV Push once  glucagon  Injectable 1 milliGRAM(s) IntraMuscular once  insulin glargine Injectable (LANTUS) 18 Unit(s) SubCutaneous at bedtime  insulin lispro (ADMELOG) corrective regimen sliding scale   SubCutaneous three times a day before meals  insulin lispro (ADMELOG) corrective regimen sliding scale   SubCutaneous at bedtime  insulin lispro Injectable (ADMELOG) 4 Unit(s) SubCutaneous three times a day before meals  lactated ringers. 1000 milliLiter(s) (75 mL/Hr) IV Continuous <Continuous>  piperacillin/tazobactam IVPB.. 3.375 Gram(s) IV Intermittent every 8 hours  polyethylene glycol 3350 17 Gram(s) Oral daily  senna 2 Tablet(s) Oral at bedtime

## 2021-03-17 NOTE — DISCHARGE NOTE PROVIDER - NSDCACTIVITY_GEN_ALL_CORE
Bathing allowed Bathing allowed/Showering allowed/Walking - Indoors allowed/Walking - Outdoors allowed

## 2021-03-17 NOTE — DIETITIAN INITIAL EVALUATION ADULT. - ORAL INTAKE PTA/DIET HISTORY
DM history x10 years. States he eats breakfast and dinner. States during the week he usually eats salad with protein and then will eat more starches on the weekend. Attributes his high A1c to "running out of pills" to treat his blood sugars.

## 2021-03-17 NOTE — DISCHARGE NOTE PROVIDER - NSDCHHNEEDSERVICE_GEN_ALL_CORE
Medication teaching and assessment/Observation and assessment/Teaching and training/Wound care and assessment/Other, specify...

## 2021-03-17 NOTE — DISCHARGE NOTE PROVIDER - NSDCCPCAREPLAN_GEN_ALL_CORE_FT
PRINCIPAL DISCHARGE DIAGNOSIS  Diagnosis: Hyperglycemia  Assessment and Plan of Treatment: improving   Started on insulin   Follow up with endocrinologist outpatient      SECONDARY DISCHARGE DIAGNOSES  Diagnosis: Diabetic foot ulcer  Assessment and Plan of Treatment: ***You had a surgery performed on 3/14/21 for left 5th ray resection, keep dressing intact until your follow up  Wound Care: Please manage wound vac at 125mmHg to be changed three times a week w/ combo foam.  follow up with Podiatry Dr. Woodard within 1 week of discharge,[phone]        Diagnosis: Essential hypertension  Assessment and Plan of Treatment: elevated, medications adjusted by cardiologist. Now improving   Continue blood pressure medication regimen as directed. Follow a low salt/cholesterol diet. Monitor for any visual changes, headaches or dizziness.  Monitor blood pressure regularly.  Follow up with your primary care provider for further management for high blood pressure.  Recommend close follow up with PCP/Cardiologist for further management      Diagnosis: Type 2 diabetes mellitus with hyperglycemia, without long-term current use of insulin  Assessment and Plan of Treatment: Seen by endocrinologist, started on insulin  Follow up with endocrinologist Dr. Claros [address] [facility], [address]nd Floor, [place] [address], [phone]     PRINCIPAL DISCHARGE DIAGNOSIS  Diagnosis: Hyperglycemia  Assessment and Plan of Treatment: improving   Started on insulin   Follow up with endocrinologist outpatient      SECONDARY DISCHARGE DIAGNOSES  Diagnosis: Essential hypertension  Assessment and Plan of Treatment: elevated, medications adjusted by cardiologist. Now improving   Continue blood pressure medication regimen as directed. Follow a low salt/cholesterol diet. Monitor for any visual changes, headaches or dizziness.  Monitor blood pressure regularly.  Follow up with your primary care provider for further management for high blood pressure.  Recommend close follow up with PCP/Cardiologist for further management      Diagnosis: Type 2 diabetes mellitus with hyperglycemia, without long-term current use of insulin  Assessment and Plan of Treatment: Seen by endocrinologist, started on insulin  Follow up with endocrinologist Dr. Claros [address] [facility], [address]nd Floor, [place] [address], [phone]    Diagnosis: Diabetic foot ulcer  Assessment and Plan of Treatment: ***You had a surgery performed on 3/14/21 for left 5th ray resection, keep dressing intact until your follow up  Wound Care: Please manage wound vac at 125mmHg to be changed three times a week w/ combo foam.  follow up with Podiatry Dr. Woodard within 1 week of discharge,[phone]    Please complete  Augmentin 875 mg 2 x day x 6 weeks total       PRINCIPAL DISCHARGE DIAGNOSIS  Diagnosis: Hyperglycemia  Assessment and Plan of Treatment: Type 2 diabetes mellitus with hyperglycemia, HgA1C -12.2% goal below 7%  - Endo consulted recommended to take Lantus 18 units  at bed time and Admelog 4 units TID AC   Follow up with endocrinologist Dr. Claros [address] [facility], [address] [place] [address], [phone]        SECONDARY DISCHARGE DIAGNOSES  Diagnosis: Essential hypertension  Assessment and Plan of Treatment: elevated, medications adjusted by cardiologist. Now improving   Continue blood pressure medication regimen as directed. Follow a low salt/cholesterol diet. Monitor for any visual changes, headaches or dizziness.  Monitor blood pressure regularly.  Follow up with your primary care provider for further management for high blood pressure.  Recommend close follow up with PCP/Cardiologist for further management      Diagnosis: Type 2 diabetes mellitus with hyperglycemia, without long-term current use of insulin  Assessment and Plan of Treatment: Seen by endocrinologist, started on insulin  Follow up with endocrinologist Dr. Claros [address] [facility], [address] [place] [address], [place] [address], [phone]    Diagnosis: Hyperlipidemia with target LDL less than 70  Assessment and Plan of Treatment: Hyperlipidemia with target LDL less than 70    Diagnosis: Diabetic foot ulcer  Assessment and Plan of Treatment: ***You had a surgery performed on 3/14/21 for left 5th ray resection, keep dressing intact until your follow up  Wound Care: Please manage wound vac at 125mmHg to be changed three times a week w/ combo foam.  follow up with Podiatry Dr. Woodard within 1 week of discharge,[phone]    Please complete  Augmentin 875 mg 2 x day x 6 weeks total, last day 4/25/2021

## 2021-03-17 NOTE — DISCHARGE NOTE PROVIDER - CARE PROVIDER_API CALL
Corky Woodard (DPM)  Podiatric Medicine and Surgery  [address] [place] [address]  Phone: [phone]  Fax: [phone]  Follow Up Time:

## 2021-03-17 NOTE — DISCHARGE NOTE PROVIDER - NSDCFUADDAPPT_GEN_ALL_CORE_FT
-Follow up with endocrinologist Dr. Claros [address] [facility], [address]nd Floor, [place] [address], [phone]  -- FU with Podiatry Dr. Woodard within 1 week of discharge,[phone]

## 2021-03-17 NOTE — DISCHARGE NOTE PROVIDER - NSDCFUADDINST_GEN_ALL_CORE_FT
Podiatry Discharge Instructions:  Follow up: Please follow up with Dr. Woodard within 1 week of discharge from the hospital, please call [phone] for appointment and discuss that you recently were seen in the hospital.  Wound Care: Please manage wound vac at 125mmHg to be changed three times a week w/ combo foam.  Weight bearing: Please weight bear as tolerated in a surgical shoe.  Antibiotics: Please continue as instructed. Podiatry Discharge Instructions:  Follow up: Please follow up with Dr. Woodard within 1 week of discharge from the hospital, please call [phone] for appointment and discuss that you recently were seen in the hospital.  Wound Care: Please manage wound vac at 125mmHg to be changed three times a week w/ combo foam.  Weight bearing: Please weight bear as tolerated in a surgical shoe.  Antibiotics: Please continue as instructed.  ****wound vac at 125mmHg to be changed three times a week w/ combo foam.

## 2021-03-17 NOTE — PROGRESS NOTE ADULT - ASSESSMENT
56/M with PMH DM  P/w worsening lt foot pain, swelling discharge X 1 week  Eval by Podiatry in ER - left foot dorsal 5th met wound to bone, tracking up extensor tendon, 3+ edema, scant purulence, mild malodor, plantar blister. I&D 3/14 partial 5th ray amp  s/p ray resection-high concern for residual OM  Clean culture with strep, further concern for remaining OM  Overall,  A) Toe cellulitis/OM  - S/p OR, clean bone culture with strep; there is concern for residual OM  - Zosyn 3.375g q 8  - F/U pending culture, and OR path    B) DM  optimized DM control to promote wound healing    Jhon Galarza MD  Pager [phone]  After 5pm and on weekends call [phone]

## 2021-03-17 NOTE — PROGRESS NOTE ADULT - ASSESSMENT
Assessment and Plan    57yo M hx of HTN, diabetes presented with cc of left foot infection 1 week    EKG - NSR     1) HTN   -BP remains elevated  - continue with norvasc 10mg daily  - coreg increased to 6.25mg BID    2) Left foot 5th toe infection   - s/p amputation doing well     3) DM2   - on insulin

## 2021-03-18 ENCOUNTER — TRANSCRIPTION ENCOUNTER (OUTPATIENT)
Age: 56
End: 2021-03-18

## 2021-03-18 LAB
ALBUMIN SERPL ELPH-MCNC: 2.5 G/DL — LOW (ref 3.3–5)
ALP SERPL-CCNC: 110 U/L — SIGNIFICANT CHANGE UP (ref 40–120)
ALT FLD-CCNC: 19 U/L — SIGNIFICANT CHANGE UP (ref 4–41)
ANION GAP SERPL CALC-SCNC: 6 MMOL/L — LOW (ref 7–14)
AST SERPL-CCNC: 26 U/L — SIGNIFICANT CHANGE UP (ref 4–40)
BILIRUB SERPL-MCNC: 0.2 MG/DL — SIGNIFICANT CHANGE UP (ref 0.2–1.2)
BUN SERPL-MCNC: 19 MG/DL — SIGNIFICANT CHANGE UP (ref 7–23)
CALCIUM SERPL-MCNC: 8.5 MG/DL — SIGNIFICANT CHANGE UP (ref 8.4–10.5)
CHLORIDE SERPL-SCNC: 104 MMOL/L — SIGNIFICANT CHANGE UP (ref 98–107)
CO2 SERPL-SCNC: 29 MMOL/L — SIGNIFICANT CHANGE UP (ref 22–31)
CREAT SERPL-MCNC: 1.47 MG/DL — HIGH (ref 0.5–1.3)
GLUCOSE BLDC GLUCOMTR-MCNC: 132 MG/DL — HIGH (ref 70–99)
GLUCOSE BLDC GLUCOMTR-MCNC: 185 MG/DL — HIGH (ref 70–99)
GLUCOSE BLDC GLUCOMTR-MCNC: 187 MG/DL — HIGH (ref 70–99)
GLUCOSE BLDC GLUCOMTR-MCNC: 208 MG/DL — HIGH (ref 70–99)
GLUCOSE SERPL-MCNC: 179 MG/DL — HIGH (ref 70–99)
HCT VFR BLD CALC: 22.8 % — LOW (ref 39–50)
HGB BLD-MCNC: 7.6 G/DL — LOW (ref 13–17)
MAGNESIUM SERPL-MCNC: 2 MG/DL — SIGNIFICANT CHANGE UP (ref 1.6–2.6)
MCHC RBC-ENTMCNC: 25.9 PG — LOW (ref 27–34)
MCHC RBC-ENTMCNC: 33.3 GM/DL — SIGNIFICANT CHANGE UP (ref 32–36)
MCV RBC AUTO: 77.8 FL — LOW (ref 80–100)
NRBC # BLD: 0 /100 WBCS — SIGNIFICANT CHANGE UP
NRBC # FLD: 0 K/UL — SIGNIFICANT CHANGE UP
PHOSPHATE SERPL-MCNC: 3.4 MG/DL — SIGNIFICANT CHANGE UP (ref 2.5–4.5)
PLATELET # BLD AUTO: 286 K/UL — SIGNIFICANT CHANGE UP (ref 150–400)
POTASSIUM SERPL-MCNC: 3.8 MMOL/L — SIGNIFICANT CHANGE UP (ref 3.5–5.3)
POTASSIUM SERPL-SCNC: 3.8 MMOL/L — SIGNIFICANT CHANGE UP (ref 3.5–5.3)
PROT SERPL-MCNC: 7.5 G/DL — SIGNIFICANT CHANGE UP (ref 6–8.3)
RBC # BLD: 2.93 M/UL — LOW (ref 4.2–5.8)
RBC # FLD: 14.3 % — SIGNIFICANT CHANGE UP (ref 10.3–14.5)
SODIUM SERPL-SCNC: 139 MMOL/L — SIGNIFICANT CHANGE UP (ref 135–145)
WBC # BLD: 9.73 K/UL — SIGNIFICANT CHANGE UP (ref 3.8–10.5)
WBC # FLD AUTO: 9.73 K/UL — SIGNIFICANT CHANGE UP (ref 3.8–10.5)

## 2021-03-18 PROCEDURE — 99232 SBSQ HOSP IP/OBS MODERATE 35: CPT

## 2021-03-18 RX ORDER — OXYCODONE AND ACETAMINOPHEN 5; 325 MG/1; MG/1
1 TABLET ORAL EVERY 4 HOURS
Refills: 0 | Status: DISCONTINUED | OUTPATIENT
Start: 2021-03-18 | End: 2021-03-19

## 2021-03-18 RX ADMIN — Medication 4 UNIT(S): at 17:59

## 2021-03-18 RX ADMIN — Medication 4 UNIT(S): at 08:39

## 2021-03-18 RX ADMIN — Medication 1: at 12:22

## 2021-03-18 RX ADMIN — PIPERACILLIN AND TAZOBACTAM 25 GRAM(S): 4; .5 INJECTION, POWDER, LYOPHILIZED, FOR SOLUTION INTRAVENOUS at 21:44

## 2021-03-18 RX ADMIN — AMLODIPINE BESYLATE 10 MILLIGRAM(S): 2.5 TABLET ORAL at 06:17

## 2021-03-18 RX ADMIN — CARVEDILOL PHOSPHATE 6.25 MILLIGRAM(S): 80 CAPSULE, EXTENDED RELEASE ORAL at 06:17

## 2021-03-18 RX ADMIN — INSULIN GLARGINE 18 UNIT(S): 100 INJECTION, SOLUTION SUBCUTANEOUS at 21:44

## 2021-03-18 RX ADMIN — PIPERACILLIN AND TAZOBACTAM 25 GRAM(S): 4; .5 INJECTION, POWDER, LYOPHILIZED, FOR SOLUTION INTRAVENOUS at 06:17

## 2021-03-18 RX ADMIN — Medication 2: at 08:40

## 2021-03-18 RX ADMIN — PIPERACILLIN AND TAZOBACTAM 25 GRAM(S): 4; .5 INJECTION, POWDER, LYOPHILIZED, FOR SOLUTION INTRAVENOUS at 13:08

## 2021-03-18 RX ADMIN — Medication 4 UNIT(S): at 12:21

## 2021-03-18 RX ADMIN — CARVEDILOL PHOSPHATE 6.25 MILLIGRAM(S): 80 CAPSULE, EXTENDED RELEASE ORAL at 17:59

## 2021-03-18 NOTE — DISCHARGE NOTE NURSING/CASE MANAGEMENT/SOCIAL WORK - PATIENT PORTAL LINK FT
You can access the FollowMyHealth Patient Portal offered by [facility] by registering at the following website: [email or web address]. By joining [facility]’s FollowMyHealth portal, you will also be able to view your health information using other applications (apps) compatible with our system.

## 2021-03-18 NOTE — DISCHARGE NOTE NURSING/CASE MANAGEMENT/SOCIAL WORK - NSDCFUADDAPPT_GEN_ALL_CORE_FT
-Follow up with endocrinologist Dr. Claros [address] [facility], [address] Floor, [place] [address], [phone]  -- FU with Podiatry Dr. Woodard within 1 week of discharge,[phone]

## 2021-03-18 NOTE — PROGRESS NOTE ADULT - ASSESSMENT
Assessment and Plan    55yo M hx of HTN, diabetes presented with cc of left foot infection 1 week    EKG - NSR     1) HTN   -BP improving  - continue with norvasc 10mg daily & coreg  6.25mg BID  -continue to monitor BP    2) Left foot 5th toe infection   - s/p amputation doing well   -left foot wound vac in place  -on IV abx  -f/u ID and podiatry     3) DM2   - on insulin

## 2021-03-18 NOTE — PROGRESS NOTE ADULT - ASSESSMENT
56/M with PMH DM (HbA1c 12.2).  P/w worsening lt foot pain, swelling discharge X 1 week. No systemic symptoms, prior trauma  In ED, afebrile with elevated BP. Labs revealed WBC 14,900, Hb 8.2, BUN 34, Cr 1.53, hyperglycemic.   Eval by Podiatry in ER - left foot dorsal 5th met wound to bone, tracking up extensor tendon, 3+ edema, scant purulence, mild malodor, plantar blister. I&D performed, was planned for Lt foot partial 5th ray resection 3/12, but pt refused  ID consulted for abx recs  s/p ray resection-high concern for residual OM  Cx as above   Clean margins growing GBS as well  MRIa s above   Rec:  A) Toe cellulitis/OM  stable  will need long term abx  Options including PICC vs po d/w patient.He is reluctant for PICC-if OR cx with no kleb could limit zosyn (or levaquin po to 14 days) and augmentin X 6 weeks  Explained that PICC + abx via it will be superior to patient but he does not want  for now continue zosyn pending any further surgical plan per podistry-MRI with ? post op changes vs abscess on 5th met-will defer to podiatry  Monitor renal function-tailor doses per renal function    B) DM  optimized DM control to promote wound healing      Will tailor plan for ID issues  per course,results.Will defer to primary team on management of other issues.  Assessment, plan and recommendations as detailed above were discussed with the medical/primary  team.  Will Follow.  Beeper [id number] MARIA ISABEL [id number].   Wknd/afterhours/No response-[phone] or Fellow on call

## 2021-03-19 VITALS — SYSTOLIC BLOOD PRESSURE: 148 MMHG | DIASTOLIC BLOOD PRESSURE: 72 MMHG

## 2021-03-19 LAB
ALBUMIN SERPL ELPH-MCNC: 2.5 G/DL — LOW (ref 3.3–5)
ALP SERPL-CCNC: 102 U/L — SIGNIFICANT CHANGE UP (ref 40–120)
ALT FLD-CCNC: 23 U/L — SIGNIFICANT CHANGE UP (ref 4–41)
ANION GAP SERPL CALC-SCNC: 7 MMOL/L — SIGNIFICANT CHANGE UP (ref 7–14)
AST SERPL-CCNC: 30 U/L — SIGNIFICANT CHANGE UP (ref 4–40)
BILIRUB SERPL-MCNC: 0.2 MG/DL — SIGNIFICANT CHANGE UP (ref 0.2–1.2)
BUN SERPL-MCNC: 19 MG/DL — SIGNIFICANT CHANGE UP (ref 7–23)
CALCIUM SERPL-MCNC: 8.4 MG/DL — SIGNIFICANT CHANGE UP (ref 8.4–10.5)
CHLORIDE SERPL-SCNC: 105 MMOL/L — SIGNIFICANT CHANGE UP (ref 98–107)
CO2 SERPL-SCNC: 26 MMOL/L — SIGNIFICANT CHANGE UP (ref 22–31)
CREAT SERPL-MCNC: 1.46 MG/DL — HIGH (ref 0.5–1.3)
CULTURE RESULTS: SIGNIFICANT CHANGE UP
GLUCOSE BLDC GLUCOMTR-MCNC: 129 MG/DL — HIGH (ref 70–99)
GLUCOSE BLDC GLUCOMTR-MCNC: 154 MG/DL — HIGH (ref 70–99)
GLUCOSE BLDC GLUCOMTR-MCNC: 225 MG/DL — HIGH (ref 70–99)
GLUCOSE SERPL-MCNC: 131 MG/DL — HIGH (ref 70–99)
HCT VFR BLD CALC: 22.6 % — LOW (ref 39–50)
HGB BLD-MCNC: 7.5 G/DL — LOW (ref 13–17)
MAGNESIUM SERPL-MCNC: 2 MG/DL — SIGNIFICANT CHANGE UP (ref 1.6–2.6)
MCHC RBC-ENTMCNC: 26 PG — LOW (ref 27–34)
MCHC RBC-ENTMCNC: 33.2 GM/DL — SIGNIFICANT CHANGE UP (ref 32–36)
MCV RBC AUTO: 78.5 FL — LOW (ref 80–100)
NRBC # BLD: 0 /100 WBCS — SIGNIFICANT CHANGE UP
NRBC # FLD: 0 K/UL — SIGNIFICANT CHANGE UP
PHOSPHATE SERPL-MCNC: 3.2 MG/DL — SIGNIFICANT CHANGE UP (ref 2.5–4.5)
PLATELET # BLD AUTO: 289 K/UL — SIGNIFICANT CHANGE UP (ref 150–400)
POTASSIUM SERPL-MCNC: 3.7 MMOL/L — SIGNIFICANT CHANGE UP (ref 3.5–5.3)
POTASSIUM SERPL-SCNC: 3.7 MMOL/L — SIGNIFICANT CHANGE UP (ref 3.5–5.3)
PROT SERPL-MCNC: 7.6 G/DL — SIGNIFICANT CHANGE UP (ref 6–8.3)
RBC # BLD: 2.88 M/UL — LOW (ref 4.2–5.8)
RBC # FLD: 14.2 % — SIGNIFICANT CHANGE UP (ref 10.3–14.5)
SODIUM SERPL-SCNC: 138 MMOL/L — SIGNIFICANT CHANGE UP (ref 135–145)
SPECIMEN SOURCE: SIGNIFICANT CHANGE UP
SURGICAL PATHOLOGY STUDY: SIGNIFICANT CHANGE UP
WBC # BLD: 10.38 K/UL — SIGNIFICANT CHANGE UP (ref 3.8–10.5)
WBC # FLD AUTO: 10.38 K/UL — SIGNIFICANT CHANGE UP (ref 3.8–10.5)

## 2021-03-19 PROCEDURE — 99232 SBSQ HOSP IP/OBS MODERATE 35: CPT

## 2021-03-19 RX ORDER — LACTOBACILLUS ACIDOPHILUS 100MM CELL
1 CAPSULE ORAL
Qty: 30 | Refills: 0
Start: 2021-03-19 | End: 2021-04-17

## 2021-03-19 RX ORDER — AMLODIPINE BESYLATE 2.5 MG/1
1 TABLET ORAL
Qty: 30 | Refills: 0
Start: 2021-03-19 | End: 2021-04-17

## 2021-03-19 RX ORDER — CARVEDILOL PHOSPHATE 80 MG/1
1 CAPSULE, EXTENDED RELEASE ORAL
Qty: 60 | Refills: 0
Start: 2021-03-19 | End: 2021-04-17

## 2021-03-19 RX ORDER — CARVEDILOL PHOSPHATE 80 MG/1
12.5 CAPSULE, EXTENDED RELEASE ORAL EVERY 12 HOURS
Refills: 0 | Status: DISCONTINUED | OUTPATIENT
Start: 2021-03-19 | End: 2021-03-19

## 2021-03-19 RX ORDER — ENOXAPARIN SODIUM 100 MG/ML
18 INJECTION SUBCUTANEOUS
Qty: 3 | Refills: 0
Start: 2021-03-19 | End: 2021-04-17

## 2021-03-19 RX ORDER — POLYETHYLENE GLYCOL 3350 17 G/17G
17 POWDER, FOR SOLUTION ORAL
Qty: 0 | Refills: 0 | DISCHARGE
Start: 2021-03-19

## 2021-03-19 RX ORDER — GLIMEPIRIDE 1 MG
0 TABLET ORAL
Qty: 0 | Refills: 0 | DISCHARGE

## 2021-03-19 RX ORDER — INSULIN LISPRO 100/ML
4 VIAL (ML) SUBCUTANEOUS
Qty: 2 | Refills: 0
Start: 2021-03-19 | End: 2021-04-17

## 2021-03-19 RX ORDER — SENNA PLUS 8.6 MG/1
2 TABLET ORAL
Qty: 0 | Refills: 0 | DISCHARGE
Start: 2021-03-19

## 2021-03-19 RX ADMIN — CARVEDILOL PHOSPHATE 6.25 MILLIGRAM(S): 80 CAPSULE, EXTENDED RELEASE ORAL at 05:39

## 2021-03-19 RX ADMIN — Medication 2: at 18:02

## 2021-03-19 RX ADMIN — Medication 4 UNIT(S): at 18:02

## 2021-03-19 RX ADMIN — Medication 1: at 09:09

## 2021-03-19 RX ADMIN — Medication 4 UNIT(S): at 12:35

## 2021-03-19 RX ADMIN — PIPERACILLIN AND TAZOBACTAM 25 GRAM(S): 4; .5 INJECTION, POWDER, LYOPHILIZED, FOR SOLUTION INTRAVENOUS at 05:39

## 2021-03-19 RX ADMIN — Medication 4 UNIT(S): at 09:09

## 2021-03-19 RX ADMIN — PIPERACILLIN AND TAZOBACTAM 25 GRAM(S): 4; .5 INJECTION, POWDER, LYOPHILIZED, FOR SOLUTION INTRAVENOUS at 12:34

## 2021-03-19 RX ADMIN — AMLODIPINE BESYLATE 10 MILLIGRAM(S): 2.5 TABLET ORAL at 05:39

## 2021-03-19 RX ADMIN — Medication 1 APPLICATION(S): at 12:35

## 2021-03-19 RX ADMIN — CARVEDILOL PHOSPHATE 12.5 MILLIGRAM(S): 80 CAPSULE, EXTENDED RELEASE ORAL at 16:58

## 2021-03-19 NOTE — PROGRESS NOTE ADULT - ASSESSMENT
57yo M hx of HTN, diabetes presented with cc of left foot infection 1 week    EKG - NSR     1) HTN   -BP improving  - continue with norvasc 10mg daily & coreg  6.25mg BID  -continue to monitor BP    2) Left foot 5th toe infection   - s/p amputation doing well   -left foot wound vac in place  -on IV abx  -f/u ID and podiatry     3) DM2   - on insulin

## 2021-03-19 NOTE — PROGRESS NOTE ADULT - SUBJECTIVE AND OBJECTIVE BOX
Fady Mckeon MD  Interventional Cardiology / Endovascular Specialist  [place] Office : [phone] [place] [address] [place] [address]  Tel:   [place] Office : [address] [facility] [place] [address]  Tel: [phone]  Cell : [phone]    Subjective/Overnight events: Patient lying in bed comfortably. No acute distress. Denies chest pain, SOB or palpitations  	  MEDICATIONS:  amLODIPine   Tablet 10 milliGRAM(s) Oral daily  carvedilol 6.25 milliGRAM(s) Oral every 12 hours    piperacillin/tazobactam IVPB.. 3.375 Gram(s) IV Intermittent every 8 hours      acetaminophen   Tablet .. 650 milliGRAM(s) Oral every 6 hours PRN  oxycodone    5 mG/acetaminophen 325 mG 1 Tablet(s) Oral every 4 hours PRN    polyethylene glycol 3350 17 Gram(s) Oral daily  senna 2 Tablet(s) Oral at bedtime    dextrose 40% Gel 15 Gram(s) Oral once  dextrose 50% Injectable 25 Gram(s) IV Push once  dextrose 50% Injectable 12.5 Gram(s) IV Push once  dextrose 50% Injectable 25 Gram(s) IV Push once  glucagon  Injectable 1 milliGRAM(s) IntraMuscular once  insulin glargine Injectable (LANTUS) 18 Unit(s) SubCutaneous at bedtime  insulin lispro (ADMELOG) corrective regimen sliding scale   SubCutaneous three times a day before meals  insulin lispro (ADMELOG) corrective regimen sliding scale   SubCutaneous at bedtime  insulin lispro Injectable (ADMELOG) 4 Unit(s) SubCutaneous three times a day before meals    collagenase Ointment 1 Application(s) Topical daily  dextrose 5%. 1000 milliLiter(s) IV Continuous <Continuous>  dextrose 5%. 1000 milliLiter(s) IV Continuous <Continuous>  lactated ringers. 1000 milliLiter(s) IV Continuous <Continuous>      PAST MEDICAL/SURGICAL HISTORY  PAST MEDICAL & SURGICAL HISTORY:  Diabetes    No significant past surgical history        SOCIAL HISTORY: Substance Use (street drugs): ( x ) never used  (  ) other:    FAMILY HISTORY:  No pertinent family history in first degree relatives        REVIEW OF SYSTEMS:  CONSTITUTIONAL: No fever, weight loss, or fatigue  EYES: No eye pain, visual disturbances, or discharge  ENMT:  No difficulty hearing, tinnitus, vertigo; No sinus or throat pain  BREASTS: No pain, masses, or nipple discharge  GASTROINTESTINAL: No abdominal or epigastric pain. No nausea, vomiting, or hematemesis; No diarrhea or constipation. No melena or hematochezia.  GENITOURINARY: No dysuria, frequency, hematuria, or incontinence  NEUROLOGICAL: No headaches, memory loss, loss of strength, numbness, or tremors  ENDOCRINE: No heat or cold intolerance; No hair loss  MUSCULOSKELETAL: No joint pain or swelling; No muscle, back, or extremity pain  PSYCHIATRIC: No depression, anxiety, mood swings, or difficulty sleeping  HEME/LYMPH: No easy bruising, or bleeding gums  All others negative    PHYSICAL EXAM:  T(C): 37.1 (03-18-21 @ 08:40), Max: 37.1 (03-17-21 @ 12:30)  HR: 75 (03-18-21 @ 08:40) (68 - 75)  BP: 140/62 (03-18-21 @ 08:40) (127/60 - 158/62)  RR: 18 (03-18-21 @ 08:40) (18 - 18)  SpO2: 97% (03-18-21 @ 08:40) (97% - 99%)  Wt(kg): --  I&O's Summary          EYES: EOMI, PERRLA, conjunctiva and sclera clear  ENMT: No tonsillar erythema, exudates, or enlargement; Moist mucous membranes, Good dentition, No lesions  Cardiovascular: Normal S1 S2, No JVD, No murmurs, No edema  Respiratory: Lungs clear to auscultation	  Gastrointestinal:  Soft, Non-tender, + BS	  Extremities: left foot wound vac                                    7.6    9.73  )-----------( 286      ( 18 Mar 2021 07:58 )             22.8     03-18    139  |  104  |  19  ----------------------------<  179<H>  3.8   |  29  |  1.47<H>    Ca    8.5      18 Mar 2021 07:58  Phos  3.4     03-18  Mg     2.0     03-18    TPro  7.5  /  Alb  2.5<L>  /  TBili  0.2  /  DBili  x   /  AST  26  /  ALT  19  /  AlkPhos  110  03-18    proBNP:   Lipid Profile:   HgA1c:   TSH:     Consultant(s) Notes Reviewed:  [x ] YES  [ ] NO    Care Discussed with Consultants/Other Providers [ x] YES  [ ] NO    Imaging Personally Reviewed independently:  [x] YES  [ ] NO    All labs, radiologic studies, vitals, orders and medications list reviewed. Patient is seen and examined at bedside. Case discussed with medical team.              
Patient is a 56y old  Male who presents with a chief complaint of Foot infection (13 Mar 2021 09:10)      INTERVAL HPI/OVERNIGHT EVENTS:   Pt is scheduled for Left foot partial 5th ray amputation with Dr. Woodard at 9am. Patient is aware of procedure and is NPO since midnight.    MEDICATIONS  (STANDING):  amLODIPine   Tablet 5 milliGRAM(s) Oral daily  dextrose 40% Gel 15 Gram(s) Oral once  dextrose 5%. 1000 milliLiter(s) (50 mL/Hr) IV Continuous <Continuous>  dextrose 5%. 1000 milliLiter(s) (100 mL/Hr) IV Continuous <Continuous>  dextrose 50% Injectable 25 Gram(s) IV Push once  dextrose 50% Injectable 12.5 Gram(s) IV Push once  dextrose 50% Injectable 25 Gram(s) IV Push once  glucagon  Injectable 1 milliGRAM(s) IntraMuscular once  hydrALAZINE 10 milliGRAM(s) Oral three times a day  insulin glargine Injectable (LANTUS) 15 Unit(s) SubCutaneous at bedtime  insulin lispro (ADMELOG) corrective regimen sliding scale   SubCutaneous every 6 hours  lactated ringers. 1000 milliLiter(s) (75 mL/Hr) IV Continuous <Continuous>  piperacillin/tazobactam IVPB.. 3.375 Gram(s) IV Intermittent every 8 hours    MEDICATIONS  (PRN):      Allergies    No Known Drug Allergies  shellfish (Angioedema)    Intolerances        Vital Signs Last 24 Hrs  T(C): 36.7 (14 Mar 2021 05:28), Max: 37 (13 Mar 2021 22:52)  T(F): 98 (14 Mar 2021 05:28), Max: 98.6 (13 Mar 2021 22:52)  HR: 80 (14 Mar 2021 05:28) (80 - 82)  BP: 151/64 (14 Mar 2021 05:28) (149/84 - 181/92)  BP(mean): --  RR: 17 (14 Mar 2021 05:28) (17 - 17)  SpO2: 95% (14 Mar 2021 05:28) (95% - 98%)    LABS:                        7.8    12.92 )-----------( 252      ( 13 Mar 2021 07:49 )             23.1     03-13    136  |  103  |  20  ----------------------------<  171<H>  3.8   |  27  |  1.37<H>    Ca    8.8      13 Mar 2021 07:49  Phos  3.3     03-13  Mg     1.7     03-13    TPro  7.3  /  Alb  2.2<L>  /  TBili  0.4  /  DBili  x   /  AST  17  /  ALT  14  /  AlkPhos  126<H>  03-13    PT/INR - ( 14 Mar 2021 07:35 )   PT: 14.3 sec;   INR: 1.27 ratio         PTT - ( 14 Mar 2021 07:35 )  PTT:30.4 sec    CAPILLARY BLOOD GLUCOSE      POCT Blood Glucose.: 212 mg/dL (14 Mar 2021 08:04)  POCT Blood Glucose.: 214 mg/dL (14 Mar 2021 05:36)  POCT Blood Glucose.: 214 mg/dL (13 Mar 2021 23:01)  POCT Blood Glucose.: 288 mg/dL (13 Mar 2021 17:33)  POCT Blood Glucose.: 203 mg/dL (13 Mar 2021 12:15)      RADIOLOGY & ADDITIONAL TESTS:    
Patient is a 56y old  Male who presents with a chief complaint of Foot infection (14 Mar 2021 11:56)       INTERVAL HPI/OVERNIGHT EVENTS:  Patient seen and evaluated at bedside.  Pt is resting comfortable in NAD. Denies N/V/F/C.   Allergies    No Known Drug Allergies  shellfish (Angioedema)    Intolerances        Vital Signs Last 24 Hrs  T(C): 36.7 (15 Mar 2021 05:12), Max: 37.1 (14 Mar 2021 10:25)  T(F): 98.1 (15 Mar 2021 05:12), Max: 98.8 (14 Mar 2021 10:25)  HR: 69 (15 Mar 2021 05:12) (68 - 78)  BP: 132/67 (15 Mar 2021 05:12) (127/63 - 160/80)  BP(mean): 80 (14 Mar 2021 11:15) (79 - 84)  RR: 17 (15 Mar 2021 05:12) (13 - 17)  SpO2: 95% (15 Mar 2021 05:12) (95% - 100%)    LABS:                        7.7    13.59 )-----------( 249      ( 14 Mar 2021 09:36 )             22.8     03-15    138  |  103  |  22  ----------------------------<  183<H>  4.2   |  29  |  1.77<H>    Ca    8.4      15 Mar 2021 07:36  Phos  3.7     03-15  Mg     1.9     03-15    TPro  7.5  /  Alb  2.5<L>  /  TBili  0.3  /  DBili  x   /  AST  11  /  ALT  13  /  AlkPhos  127<H>  03-15    PT/INR - ( 14 Mar 2021 07:35 )   PT: 14.3 sec;   INR: 1.27 ratio         PTT - ( 14 Mar 2021 07:35 )  PTT:30.4 sec    CAPILLARY BLOOD GLUCOSE      POCT Blood Glucose.: 177 mg/dL (15 Mar 2021 08:12)  POCT Blood Glucose.: 180 mg/dL (14 Mar 2021 21:42)  POCT Blood Glucose.: 163 mg/dL (14 Mar 2021 16:51)  POCT Blood Glucose.: 186 mg/dL (14 Mar 2021 12:18)      Lower Extremity Physical Exam:    Vascular: DP/PT 2/4 B/L, CFT <3 seconds B/L, Temperature gradient warm to cool B/L  Neuro: Epicritic sensation dimished to the level of ankle B/L  Musculoskeletal/Ortho: unremarkable  Skin: left foot dorsal 5th met wound to bone, tracking up extensor tendon, 3+ edema, scant purulence, mild malodor, plantar blister, etiology: diabetic neuropathy     3/15: s/p LF partial 5th ray resection left open, surgical wound is fibrogranular, no drainage noted, no pus, no surrounding erythema, no malodor, no acute signs of infection. Wound probes laterally 2cmm      
  Fady Mckeon MD  Interventional Cardiology / Endovascular Specialist  [place] Office : [phone] [place] [address] [place] [address]  Tel:   [place] Office : [address] [facility] [place] [address]  Tel: [phone]  Cell : [phone]    Subjective/Overnight events: Patient lying in bed comfortably. No acute distress. Denies chest pain, SOB or palpitations  	  MEDICATIONS:  amLODIPine   Tablet 10 milliGRAM(s) Oral daily  carvedilol 12.5 milliGRAM(s) Oral every 12 hours  piperacillin/tazobactam IVPB.. 3.375 Gram(s) IV Intermittent every 8 hours  acetaminophen   Tablet .. 650 milliGRAM(s) Oral every 6 hours PRN  oxycodone    5 mG/acetaminophen 325 mG 1 Tablet(s) Oral every 4 hours PRN  polyethylene glycol 3350 17 Gram(s) Oral daily  senna 2 Tablet(s) Oral at bedtime  dextrose 40% Gel 15 Gram(s) Oral once  dextrose 50% Injectable 25 Gram(s) IV Push once  dextrose 50% Injectable 12.5 Gram(s) IV Push once  dextrose 50% Injectable 25 Gram(s) IV Push once  glucagon  Injectable 1 milliGRAM(s) IntraMuscular once  insulin glargine Injectable (LANTUS) 18 Unit(s) SubCutaneous at bedtime  insulin lispro (ADMELOG) corrective regimen sliding scale   SubCutaneous three times a day before meals  insulin lispro (ADMELOG) corrective regimen sliding scale   SubCutaneous at bedtime  insulin lispro Injectable (ADMELOG) 4 Unit(s) SubCutaneous three times a day before meals  collagenase Ointment 1 Application(s) Topical daily  dextrose 5%. 1000 milliLiter(s) IV Continuous <Continuous>  dextrose 5%. 1000 milliLiter(s) IV Continuous <Continuous>  lactated ringers. 1000 milliLiter(s) IV Continuous <Continuous>      PAST MEDICAL/SURGICAL HISTORY  PAST MEDICAL & SURGICAL HISTORY:  Diabetes    No significant past surgical history        SOCIAL HISTORY: Substance Use (street drugs): ( x ) never used  (  ) other:    FAMILY HISTORY:  No pertinent family history in first degree relatives        REVIEW OF SYSTEMS:  CONSTITUTIONAL: No fever, weight loss, or fatigue  EYES: No eye pain, visual disturbances, or discharge  ENMT:  No difficulty hearing, tinnitus, vertigo; No sinus or throat pain  BREASTS: No pain, masses, or nipple discharge  GASTROINTESTINAL: No abdominal or epigastric pain. No nausea, vomiting, or hematemesis; No diarrhea or constipation. No melena or hematochezia.  GENITOURINARY: No dysuria, frequency, hematuria, or incontinence  NEUROLOGICAL: No headaches, memory loss, loss of strength, numbness, or tremors  ENDOCRINE: No heat or cold intolerance; No hair loss  MUSCULOSKELETAL: No joint pain or swelling; No muscle, back, or extremity pain  PSYCHIATRIC: No depression, anxiety, mood swings, or difficulty sleeping  HEME/LYMPH: No easy bruising, or bleeding gums  All others negative    PHYSICAL EXAM:  T(C): 36.9 (03-19-21 @ 14:15), Max: 37.1 (03-18-21 @ 21:05)  HR: 75 (03-19-21 @ 16:57) (63 - 75)  BP: 168/62 (03-19-21 @ 16:57) (133/65 - 168/62)  RR: 17 (03-19-21 @ 14:15) (17 - 18)  SpO2: 100% (03-19-21 @ 14:15) (98% - 100%)  Wt(kg): --  I&O's Summary      EYES: EOMI, PERRLA, conjunctiva and sclera clear  ENMT: No tonsillar erythema, exudates, or enlargement; Moist mucous membranes, Good dentition, No lesions  Cardiovascular: Normal S1 S2, No JVD, No murmurs, No edema  Respiratory: Lungs clear to auscultation	  Gastrointestinal:  Soft, Non-tender, + BS	  Extremities: left foot wound vac                                7.5    10.38 )-----------( 289      ( 19 Mar 2021 07:06 )             22.6     03-19    138  |  105  |  19  ----------------------------<  131<H>  3.7   |  26  |  1.46<H>    Ca    8.4      19 Mar 2021 07:06  Phos  3.2     03-19  Mg     2.0     03-19    TPro  7.6  /  Alb  2.5<L>  /  TBili  0.2  /  DBili  x   /  AST  30  /  ALT  23  /  AlkPhos  102  03-19    proBNP:   Lipid Profile:   HgA1c:   TSH:     Consultant(s) Notes Reviewed:  [x ] YES  [ ] NO    Care Discussed with Consultants/Other Providers [ x] YES  [ ] NO    Imaging Personally Reviewed independently:  [x] YES  [ ] NO    All labs, radiologic studies, vitals, orders and medications list reviewed. Patient is seen and examined at bedside. Case discussed with medical team.              
Chief Complaint: DM 2    History: Patient seen at bedside at lunchtime. Reports he is eating meals, denies n/v, denies s/s of hypoglycemia. Glucose trending within target range with most recent  mg/dl    Reviewed outpatient planning with patient. He reports he was on insulin PENS in the past, about 3 years ago, took pre-meal and long acting insulin for about a year, then was switched to orals - doesn't remember names. Ran out of all diabetes oral medications about 1 week prior to admission so has not been taking anything to control his blood sugar.   Knows how to use glucometer but hasn't been regularly checking glucose. Requests new glucometer for discharge  Reviewed uncontrolled A1c of 12.2%, patient is agreeable to restarting basal/bolus plan at discharge  Was able to verbally teach back appropriate use of insulin pen   Reports he would like to followup with [facility] endocrinology and requesting location in [place]    MEDICATIONS  (STANDING):  amLODIPine   Tablet 10 milliGRAM(s) Oral daily  carvedilol 6.25 milliGRAM(s) Oral every 12 hours  collagenase Ointment 1 Application(s) Topical daily  dextrose 40% Gel 15 Gram(s) Oral once  dextrose 5%. 1000 milliLiter(s) (50 mL/Hr) IV Continuous <Continuous>  dextrose 5%. 1000 milliLiter(s) (100 mL/Hr) IV Continuous <Continuous>  dextrose 50% Injectable 25 Gram(s) IV Push once  dextrose 50% Injectable 12.5 Gram(s) IV Push once  dextrose 50% Injectable 25 Gram(s) IV Push once  glucagon  Injectable 1 milliGRAM(s) IntraMuscular once  insulin glargine Injectable (LANTUS) 18 Unit(s) SubCutaneous at bedtime  insulin lispro (ADMELOG) corrective regimen sliding scale   SubCutaneous three times a day before meals  insulin lispro (ADMELOG) corrective regimen sliding scale   SubCutaneous at bedtime  insulin lispro Injectable (ADMELOG) 4 Unit(s) SubCutaneous three times a day before meals  lactated ringers. 1000 milliLiter(s) (75 mL/Hr) IV Continuous <Continuous>  piperacillin/tazobactam IVPB.. 3.375 Gram(s) IV Intermittent every 8 hours  polyethylene glycol 3350 17 Gram(s) Oral daily  senna 2 Tablet(s) Oral at bedtime    MEDICATIONS  (PRN):  acetaminophen   Tablet .. 650 milliGRAM(s) Oral every 6 hours PRN Mild Pain (1 - 3)  oxycodone    5 mG/acetaminophen 325 mG 1 Tablet(s) Oral every 4 hours PRN Moderate Pain (4 - 6)    Allergies  No Known Drug Allergies  shellfish (Angioedema)    Review of Systems:  HEENT: No pain  Cardiovascular: No chest pain  Respiratory: No SOB  GI: No nausea, vomiting    PHYSICAL EXAM:  VITALS: T(C): 37.1 (03-17-21 @ 12:30)  T(F): 98.8 (03-17-21 @ 12:30), Max: 98.8 (03-17-21 @ 12:30)  HR: 69 (03-17-21 @ 12:30) (69 - 81)  BP: 136/72 (03-17-21 @ 12:30) (136/72 - 172/77)  RR:  (16 - 18)  SpO2:  (98% - 98%)  Wt(kg): --  GENERAL: NAD  EYES: No proptosis, no lid lag, anicteric  HEENT:  Atraumatic, Normocephalic, moist mucous membranes  RESPIRATORY: unlabored respirations   PSYCH: Alert and oriented x 3, normal affect, normal mood    CAPILLARY BLOOD GLUCOSE    POCT Blood Glucose.: 120 mg/dL (17 Mar 2021 12:07)  POCT Blood Glucose.: 125 mg/dL (17 Mar 2021 08:20)  POCT Blood Glucose.: 149 mg/dL (16 Mar 2021 22:10)  POCT Blood Glucose.: 221 mg/dL (16 Mar 2021 17:03)      03-17    139  |  104  |  18  ----------------------------<  125<H>  3.8   |  26  |  1.45<H>    EGFR if : 61  EGFR if non : 53<L>    Ca    8.7      03-17  Mg     1.9     03-17  Phos  3.0     03-17    TPro  7.5  /  Alb  2.3<L>  /  TBili  0.3  /  DBili  x   /  AST  21  /  ALT  14  /  AlkPhos  117  03-17      A1C with Estimated Average Glucose Result: 12.2 % (03-12-21 @ 08:52)    Diet, Consistent Carbohydrate Renal w/Evening Snack:   DASH/TLC Sodium & Cholesterol Restricted (DASH) (03-13-21 @ 08:31)  
Patient is a 56y old  Male who presents with a chief complaint of Foot infection (11 Mar 2021 19:44)      INTERVAL HPI/OVERNIGHT EVENTS:   Pt is scheduled for LF partial 5th ray resection with Dr. Magana at 4pm. Patient is aware of procedure and is NPO since midnight.    MEDICATIONS  (STANDING):  amLODIPine   Tablet 2.5 milliGRAM(s) Oral daily  dextrose 40% Gel 15 Gram(s) Oral once  dextrose 5%. 1000 milliLiter(s) (50 mL/Hr) IV Continuous <Continuous>  dextrose 5%. 1000 milliLiter(s) (100 mL/Hr) IV Continuous <Continuous>  dextrose 50% Injectable 25 Gram(s) IV Push once  dextrose 50% Injectable 12.5 Gram(s) IV Push once  dextrose 50% Injectable 25 Gram(s) IV Push once  glucagon  Injectable 1 milliGRAM(s) IntraMuscular once  insulin glargine Injectable (LANTUS) 15 Unit(s) SubCutaneous at bedtime  insulin lispro (ADMELOG) corrective regimen sliding scale   SubCutaneous every 6 hours  lactated ringers. 1000 milliLiter(s) (75 mL/Hr) IV Continuous <Continuous>  piperacillin/tazobactam IVPB.. 3.375 Gram(s) IV Intermittent every 8 hours    MEDICATIONS  (PRN):      Allergies    No Known Drug Allergies  shellfish (Angioedema)    Intolerances        Vital Signs Last 24 Hrs  T(C): 36.9 (12 Mar 2021 06:04), Max: 37.7 (11 Mar 2021 15:20)  T(F): 98.4 (12 Mar 2021 06:04), Max: 99.8 (11 Mar 2021 15:20)  HR: 79 (12 Mar 2021 06:04) (79 - 93)  BP: 156/62 (12 Mar 2021 06:04) (156/62 - 181/96)  BP(mean): --  RR: 17 (12 Mar 2021 06:04) (16 - 18)  SpO2: 96% (12 Mar 2021 06:04) (94% - 100%)    LABS:                        8.2    14.90 )-----------( 233      ( 11 Mar 2021 17:54 )             24.4     03-11    133<L>  |  98  |  34<H>  ----------------------------<  659<HH>  4.7   |  25  |  1.53<H>    Ca    9.1      11 Mar 2021 17:54  Mg     1.8     03-11    TPro  8.4<H>  /  Alb  2.9<L>  /  TBili  0.3  /  DBili  x   /  AST  15  /  ALT  15  /  AlkPhos  162<H>  03-11    PT/INR - ( 11 Mar 2021 17:54 )   PT: 14.2 sec;   INR: 1.26 ratio         PTT - ( 11 Mar 2021 17:54 )  PTT:31.6 sec    CAPILLARY BLOOD GLUCOSE      POCT Blood Glucose.: 249 mg/dL (12 Mar 2021 06:16)  POCT Blood Glucose.: 419 mg/dL (12 Mar 2021 00:31)  POCT Blood Glucose.: 478 mg/dL (11 Mar 2021 19:40)  POCT Blood Glucose.: 586 mg/dL (11 Mar 2021 15:27)      RADIOLOGY & ADDITIONAL TESTS:    
Patient is a 56y old  Male who presents with a chief complaint of Foot infection (17 Mar 2021 15:46)    Being followed by ID for foot OM    Interval history:s/p MRI and woundvac  denies any pain   No acute events      ROS:  No cough,SOB,CP  No N/V/D./abd pain  No other complaints      Antimicrobials:    piperacillin/tazobactam IVPB.. 3.375 Gram(s) IV Intermittent every 8 hours    Other medications reviewed    Vital Signs Last 24 Hrs  T(C): 37.1 (03-18-21 @ 08:40), Max: 37.1 (03-17-21 @ 12:30)  T(F): 98.7 (03-18-21 @ 08:40), Max: 98.8 (03-17-21 @ 12:30)  HR: 75 (03-18-21 @ 08:40) (68 - 75)  BP: 140/62 (03-18-21 @ 08:40) (127/60 - 158/62)  BP(mean): --  RR: 18 (03-18-21 @ 08:40) (18 - 18)  SpO2: 97% (03-18-21 @ 08:40) (97% - 99%)    Physical Exam:      HEENT PERRLA EOMI    No oral exudate or erythema    Chest Good AE,CTA    CVS RRR S1 S2 WNl No murmur or rub or gallop    Abd soft BS normal No tenderness no masses      left foot dressing -wound VAC   chronic stasis and callus bilateral    IV site no erythema tenderness or discharge    CNS AAO X 3 no focal    Lab Data:                          7.6    9.73  )-----------( 286      ( 18 Mar 2021 07:58 )             22.8       03-18    139  |  104  |  19  ----------------------------<  179<H>  3.8   |  29  |  1.47<H>    Ca    8.5      18 Mar 2021 07:58  Phos  3.4     03-18  Mg     2.0     03-18    TPro  7.5  /  Alb  2.5<L>  /  TBili  0.2  /  DBili  x   /  AST  26  /  ALT  19  /  AlkPhos  110  03-18        Culture - Tissue with Gram Stain (collected 14 Mar 2021 18:12)  Source: .Tissue left fifth metatarsal clean  Gram Stain (14 Mar 2021 22:52):    No polymorphonuclear cells seen per low power field    No organisms seen per oil power field  Preliminary Report (16 Mar 2021 17:31):    Rare Streptococcus agalactiae (Group B) isolated    Group B streptococci are susceptible to ampicillin,    penicillin and cefazolin, but may be resistant to    erythromycin and clindamycin.    Recommendations for intrapartum prophylaxis for Group B    streptococci are penicillin or ampicillin.    Culture - Other (collected 14 Mar 2021 16:34)  Source: .Other Infected Left Foot  Final Report (16 Mar 2021 19:25):    Few Klebsiella pneumoniae    >100,000 CFU/ml Streptococcus agalactiae (Group B) isolated    Group B streptococci are susceptible to ampicillin,    penicillin and cefazolin, but may be resistant to    erythromycin and clindamycin.    Recommendations for intrapartum prophylaxis for Group B    streptococci are penicillin or ampicillin.  Organism: Klebsiella pneumoniae (16 Mar 2021 19:25)  Organism: Klebsiella pneumoniae (16 Mar 2021 19:25)      -  Amikacin: S <=16      -  Amoxicillin/Clavulanic Acid: S <=8/4      -  Ampicillin: R >16 These ampicillin results predict results for amoxicillin      -  Ampicillin/Sulbactam: S <=4/2 Enterobacter, Citrobacter, and Serratia may develop resistance during prolonged therapy (3-4 days)      -  Aztreonam: S <=4      -  Cefazolin: S <=2 Enterobacter, Citrobacter, and Serratia may develop resistance during prolonged therapy (3-4 days)      -  Cefepime: S <=2      -  Cefoxitin: S <=8      -  Ceftriaxone: S <=1 Enterobacter, Citrobacter, and Serratia may develop resistance during prolonged therapy      -  Ciprofloxacin: S <=0.25      -  Ertapenem: S <=0.5      -  Gentamicin: S <=2      -  Imipenem: S <=1      -  Levofloxacin: S <=0.5      -  Meropenem: S <=1      -  Piperacillin/Tazobactam: S <=8      -  Tobramycin: S <=2      -  Trimethoprim/Sulfamethoxazole: S <=0.5/9.5      Method Type: FORD      < from: MR Foot No Cont, Left (03.16.21 @ 14:00) >  IMPRESSION:  1. Partial fifth ray amputation with mild marrow edema without T1 marrow replacement extending throughout the remaining fifth metatarsal that may be related to recent surgery and reactive edema, however, infection cannot be absolutely excluded.  2. Mild marrow edema of the fourth metatarsal neck without T1 marrow replacement may be reactive or stress related, however, early infection may be considered with the given history.  3. Chronic appearing erosive or postsurgical change of the first distal phalanx tuft with mild marrow edema that may be reactive, however, correlation is suggested for signs of infection in this location.  4. Dorsolateral subcutaneous fluid collection is present measuring 1.0 x 0.8 cm at the level of the fifth metatarsal stump that could reflect postoperative collection such as seroma or hematoma versus small abscess.      < end of copied text >                  
Patient is a 56y old  Male who presents with a chief complaint of Foot infection (18 Mar 2021 11:47)       INTERVAL HPI/OVERNIGHT EVENTS:  Patient seen and evaluated at bedside.  Pt is resting comfortable in NAD. Denies N/V/F/C.  Pain rated at X/10    Allergies    No Known Drug Allergies  shellfish (Angioedema)    Intolerances        Vital Signs Last 24 Hrs  T(C): 36.7 (19 Mar 2021 05:05), Max: 37.1 (18 Mar 2021 21:05)  T(F): 98 (19 Mar 2021 05:05), Max: 98.7 (18 Mar 2021 21:05)  HR: 66 (19 Mar 2021 05:05) (63 - 77)  BP: 133/65 (19 Mar 2021 05:05) (133/65 - 154/67)  BP(mean): --  RR: 17 (19 Mar 2021 05:05) (17 - 18)  SpO2: 99% (19 Mar 2021 05:05) (97% - 99%)    LABS:                        7.5    10.38 )-----------( 289      ( 19 Mar 2021 07:06 )             22.6     03-19    138  |  105  |  19  ----------------------------<  131<H>  3.7   |  26  |  1.46<H>    Ca    8.4      19 Mar 2021 07:06  Phos  3.2     03-19  Mg     2.0     03-19    TPro  7.6  /  Alb  2.5<L>  /  TBili  0.2  /  DBili  x   /  AST  30  /  ALT  23  /  AlkPhos  102  03-19        CAPILLARY BLOOD GLUCOSE      POCT Blood Glucose.: 154 mg/dL (19 Mar 2021 08:21)  POCT Blood Glucose.: 187 mg/dL (18 Mar 2021 21:24)  POCT Blood Glucose.: 132 mg/dL (18 Mar 2021 17:23)  POCT Blood Glucose.: 185 mg/dL (18 Mar 2021 11:55)      Lower Extremity Physical Exam:  Vascular: DP/PT 2/4 B/L, CFT <3 seconds B/L, Temperature gradient warm to cool B/L  Neuro: Epicritic sensation dimished to the level of ankle B/L  Musculoskeletal/Ortho: unremarkable  Skin: left foot dorsal 5th met wound to bone, tracking up extensor tendon, 3+ edema, scant purulence, mild malodor, plantar blister, etiology: diabetic neuropathy     3/15: s/p LF partial 5th ray resection left open, surgical wound is fibrogranular, no drainage noted, no pus, no surrounding erythema, no malodor, no acute signs of infection. Wound probes laterally 2cmm    RADIOLOGY & ADDITIONAL TESTS:  
Anesthesia Post Operative Note    s/p day 1 of procedure: left 5th partial ray resection    56y Male POSTOP DAY 1 S/P     Vital Signs Last 24 Hrs  T(C): 36.7 (15 Mar 2021 05:12), Max: 37.1 (14 Mar 2021 10:25)  T(F): 98.1 (15 Mar 2021 05:12), Max: 98.8 (14 Mar 2021 10:25)  HR: 69 (15 Mar 2021 05:12) (68 - 78)  BP: 132/67 (15 Mar 2021 05:12) (127/63 - 160/80)  BP(mean): 80 (14 Mar 2021 11:15) (79 - 84)  RR: 17 (15 Mar 2021 05:12) (13 - 17)  SpO2: 95% (15 Mar 2021 05:12) (95% - 100%)    Patient seen at: 08:54 am    Doing well, no anesthetic complications or complaints noted or reported.  Pain is controlled.   
CC: F/U OM    Saw/spoke to patient. No fevers, no chills. No new complaints    Allergies  No Known Drug Allergies  shellfish (Angioedema)    ANTIMICROBIALS:  piperacillin/tazobactam IVPB.. 3.375 every 8 hours    PE:    Vital Signs Last 24 Hrs  T(C): 37.1 (17 Mar 2021 12:30), Max: 37.1 (17 Mar 2021 12:30)  T(F): 98.8 (17 Mar 2021 12:30), Max: 98.8 (17 Mar 2021 12:30)  HR: 69 (17 Mar 2021 12:30) (69 - 81)  BP: 136/72 (17 Mar 2021 12:30) (136/72 - 172/77)  RR: 18 (17 Mar 2021 12:30) (16 - 18)  SpO2: 98% (17 Mar 2021 12:30) (98% - 98%)    Gen: AOx3, NAD, non-toxic  CV: S1+S2 normal, nontachycardic  Resp: Clear bilat, no resp distress, no crackles/wheezes  Abd: Soft, nontender, +BS  Ext: Dressing in place with wound vac    LABS:                        7.7    10.68 )-----------( 288      ( 17 Mar 2021 07:36 )             23.0     03-17    139  |  104  |  18  ----------------------------<  125<H>  3.8   |  26  |  1.45<H>    Ca    8.7      17 Mar 2021 07:36  Phos  3.0     03-17  Mg     1.9     03-17    TPro  7.5  /  Alb  2.3<L>  /  TBili  0.3  /  DBili  x   /  AST  21  /  ALT  14  /  AlkPhos  117  03-17    MICROBIOLOGY:    .Tissue left fifth metatarsal clean  03-14-21   Rare Streptococcus agalactiae (Group B) isolated  Group B streptococci are susceptible to ampicillin,  penicillin and cefazolin, but may be resistant to  erythromycin and clindamycin.  Recommendations for intrapartum prophylaxis for Group B  streptococci are penicillin or ampicillin.  --    No polymorphonuclear cells seen per low power field  No organisms seen per oil power field    .Other Infected Left Foot  03-14-21   Few Klebsiella pneumoniae  >100,000 CFU/ml Streptococcus agalactiae (Group B) isolated  Group B streptococci are susceptible to ampicillin,  penicillin and cefazolin, but may be resistant to  erythromycin and clindamycin.  Recommendations for intrapartum prophylaxis for Group B  streptococci are penicillin or ampicillin.  --  Klebsiella pneumoniae    .Abscess foot  03-11-21   Rare Klebsiella pneumoniae  Moderate Streptococcus agalactiae (Group B)  Streptococcus agalactiae (Group B) isolated  Group B streptococci are susceptible to ampicillin,  penicillin and cefazolin, but may be resistant to  erythromycin and clindamycin.  Recommendations for intrapartum prophylaxis for Group B  streptococci are penicillin or ampicillin.  Rare Anaerobic Gram Negative Carlos Most closely resembling UNVALIDATED  PREVOTELLA  TIMONENSIS  2.12 . "Susceptibilities not performed"  Normal skin mata isolated  --  Klebsiella pneumoniae    (otherwise reviewed)    RADIOLOGY:    3/16 MR:    IMPRESSION:  1. Partial fifth ray amputation with mild marrow edema without T1 marrow replacement extending throughout the remaining fifth metatarsal that may be related to recent surgery and reactive edema, however, infection cannot be absolutely excluded.  2. Mild marrow edema of the fourth metatarsal neck without T1 marrow replacement may be reactive or stress related, however, early infection may be considered with the given history.  3. Chronic appearing erosive or postsurgical change of the first distal phalanx tuft with mild marrow edema that may be reactive, however, correlation is suggested for signs of infection in this location.  4. Dorsolateral subcutaneous fluid collection is present measuring 1.0 x 0.8 cm at the level of the fifth metatarsal stump that could reflect postoperative collection such as seroma or hematoma versus small abscess.  
Patient is a 56y old  Male who presents with a chief complaint of Foot infection (16 Mar 2021 09:29)    Being followed by ID for foot om,cellulitis    Interval history:feels well  foot pain controlled  For MRI   No acute events      ROS:  No cough,SOB,CP  No N/V/D./abd pain  No other complaints      Antimicrobials:    piperacillin/tazobactam IVPB.. 3.375 Gram(s) IV Intermittent every 8 hours    Other medications reviewed    Vital Signs Last 24 Hrs  T(C): 36.7 (03-16-21 @ 05:46), Max: 37.1 (03-15-21 @ 21:14)  T(F): 98.1 (03-16-21 @ 05:46), Max: 98.7 (03-15-21 @ 21:14)  HR: 70 (03-16-21 @ 05:46) (70 - 80)  BP: 149/71 (03-16-21 @ 05:46) (135/64 - 164/71)  BP(mean): --  RR: 16 (03-16-21 @ 05:46) (16 - 17)  SpO2: 98% (03-16-21 @ 05:46) (97% - 98%)    Physical Exam:    HEENT PERRLA EOMI    No oral exudate or erythema    Chest Good AE,CTA    CVS RRR S1 S2 WNl No murmur or rub or gallop    Abd soft BS normal No tenderness no masses      left foot dressing -no discharge   chronic stasis and callus bilateral    IV site no erythema tenderness or discharge    CNS AAO X 3 no focal    Lab Data:                          7.2    10.79 )-----------( 251      ( 16 Mar 2021 08:10 )             21.6       03-16    139  |  103  |  20  ----------------------------<  140<H>  3.9   |  29  |  1.60<H>    Creatinine, Serum: 1.60 mg/dL (03-16-21 @ 08:10)  Creatinine, Serum: 1.77 mg/dL (03-15-21 @ 07:36)  Creatinine, Serum: 1.45 mg/dL (03-14-21 @ 07:35)  Creatinine, Serum: 1.37 mg/dL (03-13-21 @ 07:49)  Creatinine, Serum: 1.32 mg/dL (03-12-21 @ 08:52)      Ca    8.6      16 Mar 2021 08:10  Phos  3.4     03-16  Mg     1.9     03-16    TPro  7.6  /  Alb  2.5<L>  /  TBili  0.2  /  DBili  x   /  AST  13  /  ALT  15  /  AlkPhos  131<H>  03-16        Culture - Other (collected 14 Mar 2021 18:14)  Source: .Other Infected Left Foot  Preliminary Report (15 Mar 2021 19:18):    Few Klebsiella pneumoniae    >100,000 CFU/ml Streptococcus agalactiae (Group B) isolated    Group B streptococci are susceptible to ampicillin,    penicillin and cefazolin, but may be resistant to    erythromycin and clindamycin.    Recommendations for intrapartum prophylaxis for Group B    streptococci are penicillin or ampicillin.    Culture - Tissue with Gram Stain (collected 14 Mar 2021 18:12)  Source: .Tissue left fifth metatarsal clean  Gram Stain (14 Mar 2021 22:52):    No polymorphonuclear cells seen per low power field    No organisms seen per oil power field  Preliminary Report (15 Mar 2021 15:19):    No growth    Culture - Abscess with Gram Stain (collected 11 Mar 2021 23:12)  Source: .Abscess foot  Preliminary Report (13 Mar 2021 16:01):    Rare Klebsiella pneumoniae    Moderate Streptococcus agalactiae (Group B)    Streptococcus agalactiae (Group B) isolated    Group B streptococci are susceptible to ampicillin,    penicillin and cefazolin, but may be resistant to    erythromycin and clindamycin.    Recommendations for intrapartum prophylaxis for Group B    streptococci are penicillin or ampicillin.    Normal skin mata isolated  Organism: Klebsiella pneumoniae (13 Mar 2021 15:55)  Organism: Klebsiella pneumoniae (13 Mar 2021 15:55)      -  Amikacin: S <=16      -  Amoxicillin/Clavulanic Acid: S <=8/4      -  Ampicillin: R >16 These ampicillin results predict results for amoxicillin      -  Ampicillin/Sulbactam: S <=4/2 Enterobacter, Citrobacter, and Serratia may develop resistance during prolonged therapy (3-4 days)      -  Aztreonam: S <=4      -  Cefazolin: S <=2 Enterobacter, Citrobacter, and Serratia may develop resistance during prolonged therapy (3-4 days)      -  Cefepime: S <=2      -  Cefoxitin: S <=8      -  Ceftriaxone: S <=1 Enterobacter, Citrobacter, and Serratia may develop resistance during prolonged therapy      -  Ciprofloxacin: S <=0.25      -  Ertapenem: S <=0.5      -  Gentamicin: S <=2      -  Imipenem: S <=1      -  Levofloxacin: S <=0.5      -  Meropenem: S <=1      -  Piperacillin/Tazobactam: S <=8      -  Tobramycin: S <=2      -  Trimethoprim/Sulfamethoxazole: S <=0.5/9.5      Method Type: FORD    Culture - Blood (collected 11 Mar 2021 23:05)  Source: .Blood Blood-Venous  Preliminary Report (13 Mar 2021 01:02):    No growth to date.    Culture - Blood (collected 11 Mar 2021 23:04)  Source: .Blood Blood-Peripheral  Preliminary Report (13 Mar 2021 01:02):    No growth to date.          < from: Xray Foot AP + Lateral + Oblique, Left (03.14.21 @ 10:57) >  FINDINGS /  IMPRESSION: The patient is status postamputation of the left fifth ray through the mid left metatarsal bone. There is slight cortical irregularity at the amputation site. Postsurgical changes are present in the overlying soft tissues.      < end of copied text >              
Patient is a 56y old  Male who presents with a chief complaint of Foot infection (19 Mar 2021 08:47)    Being followed by ID for foot OM     Interval history:feels well  for replacement of VAC today  options for abx discussed again-inferiority of po anx discussed-pt does not want IV abx  No acute events      ROS:no foot pain   No cough,SOB,CP  No N/V/D./abd pain  No other complaints      Antimicrobials:    piperacillin/tazobactam IVPB.. 3.375 Gram(s) IV Intermittent every 8 hours    Other medications reviewed    Vital Signs Last 24 Hrs  T(C): 36.7 (03-19-21 @ 05:05), Max: 37.1 (03-18-21 @ 21:05)  T(F): 98 (03-19-21 @ 05:05), Max: 98.7 (03-18-21 @ 21:05)  HR: 66 (03-19-21 @ 05:05) (63 - 77)  BP: 133/65 (03-19-21 @ 05:05) (133/65 - 154/67)  BP(mean): --  RR: 17 (03-19-21 @ 05:05) (17 - 18)  SpO2: 99% (03-19-21 @ 05:05) (97% - 99%)    Physical Exam:      HEENT PERRLA EOMI    No oral exudate or erythema    Chest Good AE,CTA    CVS RRR S1 S2 WNl No murmur or rub or gallop    Abd soft BS normal No tenderness no masses      left foot dressing -wound VAC   chronic stasis and callus bilateral    IV site no erythema tenderness or discharge    CNS AAO X 3 no focal  Lab Data:                          7.5    10.38 )-----------( 289      ( 19 Mar 2021 07:06 )             22.6       03-19    138  |  105  |  19  ----------------------------<  131<H>  3.7   |  26  |  1.46<H>    Ca    8.4      19 Mar 2021 07:06  Phos  3.2     03-19  Mg     2.0     03-19    TPro  7.6  /  Alb  2.5<L>  /  TBili  0.2  /  DBili  x   /  AST  30  /  ALT  23  /  AlkPhos  102  03-19        Culture - Tissue with Gram Stain (collected 14 Mar 2021 18:12)  Source: .Tissue left fifth metatarsal clean  Gram Stain (14 Mar 2021 22:52):    No polymorphonuclear cells seen per low power field    No organisms seen per oil power field  Preliminary Report (16 Mar 2021 17:31):    Rare Streptococcus agalactiae (Group B) isolated    Group B streptococci are susceptible to ampicillin,    penicillin and cefazolin, but may be resistant to    erythromycin and clindamycin.    Recommendations for intrapartum prophylaxis for Group B    streptococci are penicillin or ampicillin.    Culture - Other (collected 14 Mar 2021 16:34)  Source: .Other Infected Left Foot  Final Report (16 Mar 2021 19:25):    Few Klebsiella pneumoniae    >100,000 CFU/ml Streptococcus agalactiae (Group B) isolated    Group B streptococci are susceptible to ampicillin,    penicillin and cefazolin, but may be resistant to    erythromycin and clindamycin.    Recommendations for intrapartum prophylaxis for Group B    streptococci are penicillin or ampicillin.  Organism: Klebsiella pneumoniae (16 Mar 2021 19:25)  Organism: Klebsiella pneumoniae (16 Mar 2021 19:25)      -  Amikacin: S <=16      -  Amoxicillin/Clavulanic Acid: S <=8/4      -  Ampicillin: R >16 These ampicillin results predict results for amoxicillin      -  Ampicillin/Sulbactam: S <=4/2 Enterobacter, Citrobacter, and Serratia may develop resistance during prolonged therapy (3-4 days)      -  Aztreonam: S <=4      -  Cefazolin: S <=2 Enterobacter, Citrobacter, and Serratia may develop resistance during prolonged therapy (3-4 days)      -  Cefepime: S <=2      -  Cefoxitin: S <=8      -  Ceftriaxone: S <=1 Enterobacter, Citrobacter, and Serratia may develop resistance during prolonged therapy      -  Ciprofloxacin: S <=0.25      -  Ertapenem: S <=0.5      -  Gentamicin: S <=2      -  Imipenem: S <=1      -  Levofloxacin: S <=0.5      -  Meropenem: S <=1      -  Piperacillin/Tazobactam: S <=8      -  Tobramycin: S <=2      -  Trimethoprim/Sulfamethoxazole: S <=0.5/9.5      Method Type: FORD          < from: MR Foot No Cont, Left (03.16.21 @ 14:00) >    IMPRESSION:  1. Partial fifth ray amputation with mild marrow edema without T1 marrow replacement extending throughout the remaining fifth metatarsal that may be related to recent surgery and reactive edema, however, infection cannot be absolutely excluded.  2. Mild marrow edema of the fourth metatarsal neck without T1 marrow replacement may be reactive or stress related, however, early infection may be considered with the given history.  3. Chronic appearing erosive or postsurgical change of the first distal phalanx tuft with mild marrow edema that may be reactive, however, correlation is suggested for signs of infection in this location.  4. Dorsolateral subcutaneous fluid collection is present measuring 1.0 x 0.8 cm at the level of the fifth metatarsal stump that could reflect postoperative collection such as seroma or hematoma versus small abscess.      < end of copied text >              
    SUBJECTIVE / OVERNIGHT EVENTS: pt seen and examined      MEDICATIONS  (STANDING):  amLODIPine   Tablet 2.5 milliGRAM(s) Oral daily  dextrose 40% Gel 15 Gram(s) Oral once  dextrose 5%. 1000 milliLiter(s) (50 mL/Hr) IV Continuous <Continuous>  dextrose 5%. 1000 milliLiter(s) (100 mL/Hr) IV Continuous <Continuous>  dextrose 50% Injectable 25 Gram(s) IV Push once  dextrose 50% Injectable 12.5 Gram(s) IV Push once  dextrose 50% Injectable 25 Gram(s) IV Push once  glucagon  Injectable 1 milliGRAM(s) IntraMuscular once  insulin glargine Injectable (LANTUS) 15 Unit(s) SubCutaneous at bedtime  insulin lispro (ADMELOG) corrective regimen sliding scale   SubCutaneous every 6 hours  lactated ringers. 1000 milliLiter(s) (75 mL/Hr) IV Continuous <Continuous>  piperacillin/tazobactam IVPB.. 3.375 Gram(s) IV Intermittent every 8 hours    MEDICATIONS  (PRN):      Orthostatic VS    Vital Signs Last 24 Hrs  T(C): 37.2 (12 Mar 2021 18:58), Max: 37.2 (12 Mar 2021 18:58)  T(F): 99 (12 Mar 2021 18:58), Max: 99 (12 Mar 2021 18:58)  HR: 89 (12 Mar 2021 18:58) (76 - 90)  BP: 160/78 (12 Mar 2021 18:58) (156/62 - 181/96)  BP(mean): --  RR: 17 (12 Mar 2021 18:58) (17 - 18)  SpO2: 97% (12 Mar 2021 18:58) (96% - 100%)  CAPILLARY BLOOD GLUCOSE      POCT Blood Glucose.: 215 mg/dL (12 Mar 2021 16:55)  POCT Blood Glucose.: 218 mg/dL (12 Mar 2021 12:11)  POCT Blood Glucose.: 249 mg/dL (12 Mar 2021 06:16)  POCT Blood Glucose.: 419 mg/dL (12 Mar 2021 00:31)    I&O's Summary    11 Mar 2021 07:01  -  12 Mar 2021 07:00  --------------------------------------------------------  IN: 0 mL / OUT: 500 mL / NET: -500 mL        Constitutional: No fever, fatigue  Skin: No rash.  Eyes: No recent vision problems or eye pain.  ENT: No congestion, ear pain, or sore throat.  Cardiovascular: No chest pain or palpation.  Respiratory: No cough, shortness of breath, congestion, or wheezing.  Gastrointestinal: No abdominal pain, nausea, vomiting, or diarrhea.  Genitourinary: No dysuria.  Musculoskeletal: No joint swelling.  Neurologic: No headache.    PHYSICAL EXAM:  GENERAL: NAD  EYES: EOMI, PERRLA  NECK: Supple, No JVD  CHEST/LUNG: dec breath sounds at bases  HEART:  S1 , S2 +  ABDOMEN: soft , bs+  EXTREMITIES:  edema+, foot dressing+  NEUROLOGY:alert awake oriented       LABS:                        7.5    15.05 )-----------( 243      ( 12 Mar 2021 11:38 )             22.5     03-12    134<L>  |  103  |  26<H>  ----------------------------<  225<H>  4.5   |  24  |  1.32<H>    Ca    8.8      12 Mar 2021 08:52  Mg     1.8     03-12    TPro  7.1  /  Alb  2.0<L>  /  TBili  0.4  /  DBili  x   /  AST  14  /  ALT  14  /  AlkPhos  121<H>  03-12    PT/INR - ( 11 Mar 2021 17:54 )   PT: 14.2 sec;   INR: 1.26 ratio         PTT - ( 11 Mar 2021 17:54 )  PTT:31.6 sec          RADIOLOGY & ADDITIONAL TESTS:    Imaging Personally Reviewed:    Consultant(s) Notes Reviewed:      Care Discussed with Consultants/Other Providers:  
  Fady Mckeon MD  Interventional Cardiology / Endovascular Specialist  [place] Office : [address] [place] [address] [place] [address]  Tel:   [place] Office : [address] [facility] [place] [address]  Tel: [phone]  Cell : [phone]    Subjective/Overnight events: Patient lying in bed comfortably. No acute distress. Denies chest pain, SOB or palpitations  	  MEDICATIONS:  amLODIPine   Tablet 10 milliGRAM(s) Oral daily  carvedilol 6.25 milliGRAM(s) Oral every 12 hours    piperacillin/tazobactam IVPB.. 3.375 Gram(s) IV Intermittent every 8 hours      acetaminophen   Tablet .. 650 milliGRAM(s) Oral every 6 hours PRN  oxycodone    5 mG/acetaminophen 325 mG 1 Tablet(s) Oral every 4 hours PRN    polyethylene glycol 3350 17 Gram(s) Oral daily  senna 2 Tablet(s) Oral at bedtime    dextrose 40% Gel 15 Gram(s) Oral once  dextrose 50% Injectable 25 Gram(s) IV Push once  dextrose 50% Injectable 12.5 Gram(s) IV Push once  dextrose 50% Injectable 25 Gram(s) IV Push once  glucagon  Injectable 1 milliGRAM(s) IntraMuscular once  insulin glargine Injectable (LANTUS) 18 Unit(s) SubCutaneous at bedtime  insulin lispro (ADMELOG) corrective regimen sliding scale   SubCutaneous three times a day before meals  insulin lispro (ADMELOG) corrective regimen sliding scale   SubCutaneous at bedtime  insulin lispro Injectable (ADMELOG) 4 Unit(s) SubCutaneous three times a day before meals    collagenase Ointment 1 Application(s) Topical daily  dextrose 5%. 1000 milliLiter(s) IV Continuous <Continuous>  dextrose 5%. 1000 milliLiter(s) IV Continuous <Continuous>  lactated ringers. 1000 milliLiter(s) IV Continuous <Continuous>      PAST MEDICAL/SURGICAL HISTORY  PAST MEDICAL & SURGICAL HISTORY:  Diabetes    No significant past surgical history        SOCIAL HISTORY: Substance Use (street drugs): ( x ) never used  (  ) other:    FAMILY HISTORY:  No pertinent family history in first degree relatives        REVIEW OF SYSTEMS:  CONSTITUTIONAL: No fever, weight loss, or fatigue  EYES: No eye pain, visual disturbances, or discharge  ENMT:  No difficulty hearing, tinnitus, vertigo; No sinus or throat pain  BREASTS: No pain, masses, or nipple discharge  GASTROINTESTINAL: No abdominal or epigastric pain. No nausea, vomiting, or hematemesis; No diarrhea or constipation. No melena or hematochezia.  GENITOURINARY: No dysuria, frequency, hematuria, or incontinence  NEUROLOGICAL: No headaches, memory loss, loss of strength, numbness, or tremors  ENDOCRINE: No heat or cold intolerance; No hair loss  MUSCULOSKELETAL: No joint pain or swelling; No muscle, back, or extremity pain  PSYCHIATRIC: No depression, anxiety, mood swings, or difficulty sleeping  HEME/LYMPH: No easy bruising, or bleeding gums  All others negative    PHYSICAL EXAM:  T(C): 37.1 (03-17-21 @ 12:30), Max: 37.1 (03-17-21 @ 12:30)  HR: 69 (03-17-21 @ 12:30) (69 - 81)  BP: 136/72 (03-17-21 @ 12:30) (136/72 - 172/77)  RR: 18 (03-17-21 @ 12:30) (16 - 18)  SpO2: 98% (03-17-21 @ 12:30) (98% - 98%)  Wt(kg): --  I&O's Summary          EYES: EOMI, PERRLA, conjunctiva and sclera clear  ENMT: No tonsillar erythema, exudates, or enlargement; Moist mucous membranes, Good dentition, No lesions  Cardiovascular: Normal S1 S2, No JVD, No murmurs, No edema  Respiratory: Lungs clear to auscultation	  Gastrointestinal:  Soft, Non-tender, + BS	  Extremities: left foot covered                                    7.7    10.68 )-----------( 288      ( 17 Mar 2021 07:36 )             23.0     03-17    139  |  104  |  18  ----------------------------<  125<H>  3.8   |  26  |  1.45<H>    Ca    8.7      17 Mar 2021 07:36  Phos  3.0     03-17  Mg     1.9     03-17    TPro  7.5  /  Alb  2.3<L>  /  TBili  0.3  /  DBili  x   /  AST  21  /  ALT  14  /  AlkPhos  117  03-17    proBNP:   Lipid Profile:   HgA1c:   TSH:     Consultant(s) Notes Reviewed:  [x ] YES  [ ] NO    Care Discussed with Consultants/Other Providers [ x] YES  [ ] NO    Imaging Personally Reviewed independently:  [x] YES  [ ] NO    All labs, radiologic studies, vitals, orders and medications list reviewed. Patient is seen and examined at bedside. Case discussed with medical team.              
  Fady Mckeon MD  Interventional Cardiology / Endovascular Specialist  [place] Office : [address] [place] [address] [place] [address]  Tel:   [place] Office : [address] [facility] [place] [address]  Tel: [phone]  Cell : [phone]    Subjective/Overnight events: Patient lying in bed comfortably. No acute distress. Denies chest pain, SOB or palpitations  	  MEDICATIONS:  amLODIPine   Tablet 10 milliGRAM(s) Oral daily  hydrALAZINE 10 milliGRAM(s) Oral three times a day    piperacillin/tazobactam IVPB.. 3.375 Gram(s) IV Intermittent every 8 hours      acetaminophen   Tablet .. 650 milliGRAM(s) Oral every 6 hours PRN  HYDROmorphone  Injectable 0.5 milliGRAM(s) IV Push every 4 hours PRN  oxycodone    5 mG/acetaminophen 325 mG 1 Tablet(s) Oral every 4 hours PRN    polyethylene glycol 3350 17 Gram(s) Oral daily  senna 2 Tablet(s) Oral at bedtime    dextrose 40% Gel 15 Gram(s) Oral once  dextrose 50% Injectable 25 Gram(s) IV Push once  dextrose 50% Injectable 12.5 Gram(s) IV Push once  dextrose 50% Injectable 25 Gram(s) IV Push once  glucagon  Injectable 1 milliGRAM(s) IntraMuscular once  insulin glargine Injectable (LANTUS) 18 Unit(s) SubCutaneous at bedtime  insulin lispro (ADMELOG) corrective regimen sliding scale   SubCutaneous three times a day before meals  insulin lispro (ADMELOG) corrective regimen sliding scale   SubCutaneous at bedtime  insulin lispro Injectable (ADMELOG) 4 Unit(s) SubCutaneous three times a day before meals    collagenase Ointment 1 Application(s) Topical daily  dextrose 5%. 1000 milliLiter(s) IV Continuous <Continuous>  dextrose 5%. 1000 milliLiter(s) IV Continuous <Continuous>  lactated ringers. 1000 milliLiter(s) IV Continuous <Continuous>      PAST MEDICAL/SURGICAL HISTORY  PAST MEDICAL & SURGICAL HISTORY:  Diabetes    No significant past surgical history        SOCIAL HISTORY: Substance Use (street drugs): ( x ) never used  (  ) other:    FAMILY HISTORY:  No pertinent family history in first degree relatives        REVIEW OF SYSTEMS:  CONSTITUTIONAL: No fever, weight loss, or fatigue  EYES: No eye pain, visual disturbances, or discharge  ENMT:  No difficulty hearing, tinnitus, vertigo; No sinus or throat pain  BREASTS: No pain, masses, or nipple discharge  GASTROINTESTINAL: No abdominal or epigastric pain. No nausea, vomiting, or hematemesis; No diarrhea or constipation. No melena or hematochezia.  GENITOURINARY: No dysuria, frequency, hematuria, or incontinence  NEUROLOGICAL: No headaches, memory loss, loss of strength, numbness, or tremors  ENDOCRINE: No heat or cold intolerance; No hair loss  MUSCULOSKELETAL: No joint pain or swelling; No muscle, back, or extremity pain  PSYCHIATRIC: No depression, anxiety, mood swings, or difficulty sleeping  HEME/LYMPH: No easy bruising, or bleeding gums  All others negative    PHYSICAL EXAM:  T(C): 36.7 (03-16-21 @ 05:46), Max: 37.1 (03-15-21 @ 21:14)  HR: 70 (03-16-21 @ 05:46) (70 - 80)  BP: 149/71 (03-16-21 @ 05:46) (149/71 - 164/71)  RR: 16 (03-16-21 @ 05:46) (16 - 17)  SpO2: 98% (03-16-21 @ 05:46) (97% - 98%)  Wt(kg): --  I&O's Summary        EYES: EOMI, PERRLA, conjunctiva and sclera clear  ENMT: No tonsillar erythema, exudates, or enlargement; Moist mucous membranes, Good dentition, No lesions  Cardiovascular: Normal S1 S2, No JVD, No murmurs, No edema  Respiratory: Lungs clear to auscultation	  Gastrointestinal:  Soft, Non-tender, + BS	  Extremities: left foot covered                                    7.2    10.79 )-----------( 251      ( 16 Mar 2021 08:10 )             21.6     03-16    139  |  103  |  20  ----------------------------<  140<H>  3.9   |  29  |  1.60<H>    Ca    8.6      16 Mar 2021 08:10  Phos  3.4     03-16  Mg     1.9     03-16    TPro  7.6  /  Alb  2.5<L>  /  TBili  0.2  /  DBili  x   /  AST  13  /  ALT  15  /  AlkPhos  131<H>  03-16    proBNP:   Lipid Profile:   HgA1c:   TSH:     Consultant(s) Notes Reviewed:  [x ] YES  [ ] NO    Care Discussed with Consultants/Other Providers [ x] YES  [ ] NO    Imaging Personally Reviewed independently:  [x] YES  [ ] NO    All labs, radiologic studies, vitals, orders and medications list reviewed. Patient is seen and examined at bedside. Case discussed with medical team.              
Patient is a 56y old  Male who presents with a chief complaint of Foot infection (14 Mar 2021 11:31)    Being followed by ID for left foot ulcer,OM    Interval history:for surgery today  denies any foot pain   No acute events      ROS:  No cough,SOB,CP  No N/V/D./abd pain  No other complaints      Antimicrobials:    piperacillin/tazobactam IVPB.. 3.375 Gram(s) IV Intermittent every 8 hours    Other medications reviewed    Vital Signs Last 24 Hrs  T(C): 37 (03-14-21 @ 11:00), Max: 37.1 (03-14-21 @ 10:25)  T(F): 98.6 (03-14-21 @ 11:00), Max: 98.8 (03-14-21 @ 10:25)  HR: 70 (03-14-21 @ 11:15) (68 - 82)  BP: 127/63 (03-14-21 @ 11:15) (127/63 - 181/92)  BP(mean): 80 (03-14-21 @ 11:15) (79 - 84)  RR: 14 (03-14-21 @ 11:15) (13 - 17)  SpO2: 98% (03-14-21 @ 11:15) (94% - 100%)    Physical Exam:        HEENT PERRLA EOMI    No oral exudate or erythema    Chest Good AE,CTA    CVS RRR S1 S2 WNl No murmur or rub or gallop    Abd soft BS normal No tenderness no masses      left foot 5th toe with cellulitis/ulcer-some discharge  chronic stasis and callus bilateral    IV site no erythema tenderness or discharge    CNS AAO X 3 no focal    Lab Data:                          7.7    13.59 )-----------( 249      ( 14 Mar 2021 09:36 )             22.8     WBC Count: 13.59 (03-14-21 @ 09:36)  WBC Count: 14.73 (03-14-21 @ 08:36)  WBC Count: 12.92 (03-13-21 @ 07:49)  WBC Count: 15.05 (03-12-21 @ 11:38)  WBC Count: 14.67 (03-12-21 @ 08:52)  WBC Count: 14.90 (03-11-21 @ 17:54)    03-14    134<L>  |  99  |  19  ----------------------------<  211<H>  4.1   |  25  |  1.45<H>    Ca    8.7      14 Mar 2021 07:35  Phos  3.3     03-13  Mg     1.7     03-14    TPro  7.5  /  Alb  2.1<L>  /  TBili  0.4  /  DBili  x   /  AST  15  /  ALT  14  /  AlkPhos  148<H>  03-14        Culture - Abscess with Gram Stain (collected 11 Mar 2021 23:12)  Source: .Abscess foot  Preliminary Report (13 Mar 2021 16:01):    Rare Klebsiella pneumoniae    Moderate Streptococcus agalactiae (Group B)    Streptococcus agalactiae (Group B) isolated    Group B streptococci are susceptible to ampicillin,    penicillin and cefazolin, but may be resistant to    erythromycin and clindamycin.    Recommendations for intrapartum prophylaxis for Group B    streptococci are penicillin or ampicillin.    Normal skin mata isolated  Organism: Klebsiella pneumoniae (13 Mar 2021 15:55)  Organism: Klebsiella pneumoniae (13 Mar 2021 15:55)      -  Amikacin: S <=16      -  Amoxicillin/Clavulanic Acid: S <=8/4      -  Ampicillin: R >16 These ampicillin results predict results for amoxicillin      -  Ampicillin/Sulbactam: S <=4/2 Enterobacter, Citrobacter, and Serratia may develop resistance during prolonged therapy (3-4 days)      -  Aztreonam: S <=4      -  Cefazolin: S <=2 Enterobacter, Citrobacter, and Serratia may develop resistance during prolonged therapy (3-4 days)      -  Cefepime: S <=2      -  Cefoxitin: S <=8      -  Ceftriaxone: S <=1 Enterobacter, Citrobacter, and Serratia may develop resistance during prolonged therapy      -  Ciprofloxacin: S <=0.25      -  Ertapenem: S <=0.5      -  Gentamicin: S <=2      -  Imipenem: S <=1      -  Levofloxacin: S <=0.5      -  Meropenem: S <=1      -  Piperacillin/Tazobactam: S <=8      -  Tobramycin: S <=2      -  Trimethoprim/Sulfamethoxazole: S <=0.5/9.5      Method Type: FORD    Culture - Blood (collected 11 Mar 2021 23:05)  Source: .Blood Blood-Venous  Preliminary Report (13 Mar 2021 01:02):    No growth to date.    Culture - Blood (collected 11 Mar 2021 23:04)  Source: .Blood Blood-Peripheral  Preliminary Report (13 Mar 2021 01:02):    No growth to date.      < from: Xray Foot AP + Lateral + Oblique, Left (03.11.21 @ 17:06) >  IMPRESSION:  Diffuse soft tissue swelling. Focal soft tissue defect/ulceration over 5th IP joint region. Eroded appearing 5th proximal phalanx head with subluxed fused block distal phalanx suspicious for osteomyelitis. No tracking gas collections beyond this region and no additional suspected areas of acute osteomyelitis.    Flattened and slightly sclerotic appearing hallux distal phalanx may reflect sequelae of old trauma and/or old infection.    Intact and normally aligned remaining osteoarticular structures with preserved remaining joint spaces and no joint margin erosions.    Thick plantar and posterior calcaneal and smaller dorsal midfoot articular margin enthesophytes.    No discrete lytic or blastic lesions.        < end of copied text >                  
Patient is a 56y old  Male who presents with a chief complaint of Foot infection (14 Mar 2021 11:56)       INTERVAL HPI/OVERNIGHT EVENTS:  Patient seen and evaluated at bedside.  Pt is resting comfortable in NAD. Denies N/V/F/C.  Pain rated at X/10    Allergies    No Known Drug Allergies  shellfish (Angioedema)    Intolerances        Vital Signs Last 24 Hrs  T(C): 36.7 (15 Mar 2021 05:12), Max: 37.1 (14 Mar 2021 10:25)  T(F): 98.1 (15 Mar 2021 05:12), Max: 98.8 (14 Mar 2021 10:25)  HR: 69 (15 Mar 2021 05:12) (68 - 78)  BP: 132/67 (15 Mar 2021 05:12) (127/63 - 160/80)  BP(mean): 80 (14 Mar 2021 11:15) (79 - 84)  RR: 17 (15 Mar 2021 05:12) (13 - 17)  SpO2: 95% (15 Mar 2021 05:12) (95% - 100%)    LABS:                        7.7    13.59 )-----------( 249      ( 14 Mar 2021 09:36 )             22.8     03-15    138  |  103  |  22  ----------------------------<  183<H>  4.2   |  29  |  1.77<H>    Ca    8.4      15 Mar 2021 07:36  Phos  3.7     03-15  Mg     1.9     03-15    TPro  7.5  /  Alb  2.5<L>  /  TBili  0.3  /  DBili  x   /  AST  11  /  ALT  13  /  AlkPhos  127<H>  03-15    PT/INR - ( 14 Mar 2021 07:35 )   PT: 14.3 sec;   INR: 1.27 ratio         PTT - ( 14 Mar 2021 07:35 )  PTT:30.4 sec    CAPILLARY BLOOD GLUCOSE      POCT Blood Glucose.: 177 mg/dL (15 Mar 2021 08:12)  POCT Blood Glucose.: 180 mg/dL (14 Mar 2021 21:42)  POCT Blood Glucose.: 163 mg/dL (14 Mar 2021 16:51)  POCT Blood Glucose.: 186 mg/dL (14 Mar 2021 12:18)      Lower Extremity Physical Exam:    Vascular: DP/PT 2/4 B/L, CFT <3 seconds B/L, Temperature gradient warm to cool B/L  Neuro: Epicritic sensation dimished to the level of ankle B/L  Musculoskeletal/Ortho: unremarkable  Skin: left foot dorsal 5th met wound to bone, tracking up extensor tendon, 3+ edema, scant purulence, mild malodor, plantar blister, etiology: diabetic neuropathy     3/15: s/p LF partial 5th ray resection left open, surgical wound is fibrogranular, no drainage noted, no pus, no surrounding erythema, no malodor, no acute signs of infection. Wound probes laterally 2cmm.     RADIOLOGY & ADDITIONAL TESTS:  
Patient is a 56y old  Male who presents with a chief complaint of Foot infection (15 Mar 2021 09:20)    Being followed by ID for left foot ulcer    Interval history:s/p surgery  OR note-intra op impression high concern for residual infection  possibl;e vac v graft   No acute events      ROS:  No cough,SOB,CP  No N/V/D./abd pain  No other complaints      Antimicrobials:    piperacillin/tazobactam IVPB.. 3.375 Gram(s) IV Intermittent every 8 hours    Other medications reviewed    Vital Signs Last 24 Hrs  T(C): 36.7 (03-15-21 @ 11:34), Max: 36.7 (03-14-21 @ 21:31)  T(F): 98 (03-15-21 @ 11:34), Max: 98.1 (03-14-21 @ 21:31)  HR: 75 (03-15-21 @ 15:08) (69 - 78)  BP: 135/64 (03-15-21 @ 15:08) (128/70 - 154/85)  BP(mean): --  RR: 16 (03-15-21 @ 11:34) (16 - 17)  SpO2: 97% (03-15-21 @ 11:34) (95% - 97%)    Physical Exam:    HEENT PERRLA EOMI    No oral exudate or erythema    Chest Good AE,CTA    CVS RRR S1 S2 WNl No murmur or rub or gallop    Abd soft BS normal No tenderness no masses      left foot dressing -no discharge   chronic stasis and callus bilateral    IV site no erythema tenderness or discharge    CNS AAO X 3 no focal  Lab Data:                          7.6    12.31 )-----------( 227      ( 15 Mar 2021 07:36 )             22.5       03-15    138  |  103  |  22  ----------------------------<  183<H>  4.2   |  29  |  1.77<H>    Ca    8.4      15 Mar 2021 07:36  Phos  3.7     03-15  Mg     1.9     03-15    TPro  7.5  /  Alb  2.5<L>  /  TBili  0.3  /  DBili  x   /  AST  11  /  ALT  13  /  AlkPhos  127<H>  03-15        Culture - Tissue with Gram Stain (collected 14 Mar 2021 18:12)  Source: .Tissue left fifth metatarsal clean  Gram Stain (14 Mar 2021 22:52):    No polymorphonuclear cells seen per low power field    No organisms seen per oil power field  Preliminary Report (15 Mar 2021 15:19):    No growth    Culture - Abscess with Gram Stain (collected 11 Mar 2021 23:12)  Source: .Abscess foot  Preliminary Report (13 Mar 2021 16:01):    Rare Klebsiella pneumoniae    Moderate Streptococcus agalactiae (Group B)    Streptococcus agalactiae (Group B) isolated    Group B streptococci are susceptible to ampicillin,    penicillin and cefazolin, but may be resistant to    erythromycin and clindamycin.    Recommendations for intrapartum prophylaxis for Group B    streptococci are penicillin or ampicillin.    Normal skin mata isolated  Organism: Klebsiella pneumoniae (13 Mar 2021 15:55)  Organism: Klebsiella pneumoniae (13 Mar 2021 15:55)      -  Amikacin: S <=16      -  Amoxicillin/Clavulanic Acid: S <=8/4      -  Ampicillin: R >16 These ampicillin results predict results for amoxicillin      -  Ampicillin/Sulbactam: S <=4/2 Enterobacter, Citrobacter, and Serratia may develop resistance during prolonged therapy (3-4 days)      -  Aztreonam: S <=4      -  Cefazolin: S <=2 Enterobacter, Citrobacter, and Serratia may develop resistance during prolonged therapy (3-4 days)      -  Cefepime: S <=2      -  Cefoxitin: S <=8      -  Ceftriaxone: S <=1 Enterobacter, Citrobacter, and Serratia may develop resistance during prolonged therapy      -  Ciprofloxacin: S <=0.25      -  Ertapenem: S <=0.5      -  Gentamicin: S <=2      -  Imipenem: S <=1      -  Levofloxacin: S <=0.5      -  Meropenem: S <=1      -  Piperacillin/Tazobactam: S <=8      -  Tobramycin: S <=2      -  Trimethoprim/Sulfamethoxazole: S <=0.5/9.5      Method Type: FORD    Culture - Blood (collected 11 Mar 2021 23:05)  Source: .Blood Blood-Venous  Preliminary Report (13 Mar 2021 01:02):    No growth to date.    Culture - Blood (collected 11 Mar 2021 23:04)  Source: .Blood Blood-Peripheral  Preliminary Report (13 Mar 2021 01:02):    No growth to date.      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 There is slight cortical irregularity at the amputation site. Postsurgical changes are present in the overlying soft tissues.        < end of copied text >                  
Podiatry pager #: [phone] ([place])/ [phone] ([facility])    Patient is a 56y old  Male who presents with a chief complaint of Foot infection (16 Mar 2021 15:09)       INTERVAL HPI/OVERNIGHT EVENTS:  Patient seen and evaluated at bedside.  Pt is resting comfortable in NAD. Denies N/V/F/C.     Allergies    No Known Drug Allergies  shellfish (Angioedema)    Intolerances        Vital Signs Last 24 Hrs  T(C): 37 (17 Mar 2021 05:59), Max: 37 (17 Mar 2021 05:59)  T(F): 98.6 (17 Mar 2021 05:59), Max: 98.6 (17 Mar 2021 05:59)  HR: 73 (17 Mar 2021 05:59) (68 - 81)  BP: 167/64 (17 Mar 2021 05:59) (137/67 - 172/77)  BP(mean): --  RR: 17 (17 Mar 2021 05:59) (16 - 17)  SpO2: 98% (17 Mar 2021 05:59) (98% - 98%)    LABS:                        7.7    10.68 )-----------( 288      ( 17 Mar 2021 07:36 )             23.0     03-17    139  |  104  |  18  ----------------------------<  125<H>  3.8   |  26  |  1.45<H>    Ca    8.7      17 Mar 2021 07:36  Phos  3.0     03-17  Mg     1.9     03-17    TPro  7.5  /  Alb  2.3<L>  /  TBili  0.3  /  DBili  x   /  AST  21  /  ALT  14  /  AlkPhos  117  03-17        CAPILLARY BLOOD GLUCOSE      POCT Blood Glucose.: 125 mg/dL (17 Mar 2021 08:20)  POCT Blood Glucose.: 149 mg/dL (16 Mar 2021 22:10)  POCT Blood Glucose.: 221 mg/dL (16 Mar 2021 17:03)  POCT Blood Glucose.: 182 mg/dL (16 Mar 2021 11:52)      Lower Extremity Physical Exam:    Vascular: DP/PT 2/4 B/L, CFT <3 seconds B/L, Temperature gradient warm to cool B/L  Neuro: Epicritic sensation dimished to the level of ankle B/L  Musculoskeletal/Ortho: unremarkable  Skin: left foot dorsal 5th met wound to bone, tracking up extensor tendon, 3+ edema, scant purulence, mild malodor, plantar blister, etiology: diabetic neuropathy     3/15: s/p LF partial 5th ray resection left open, surgical wound is fibrogranular, no drainage noted, no pus, no surrounding erythema, no malodor, no acute signs of infection. Wound probes laterally 2cmm    RADIOLOGY & ADDITIONAL TESTS:  
Podiatry pager #: [phone] ([place])/ [phone] ([facility])    Patient is a 56y old  Male who presents with a chief complaint of Foot infection (13 Mar 2021 07:17)       INTERVAL HPI/OVERNIGHT EVENTS:  Patient seen and evaluated at bedside.  Pt is resting comfortable in NAD. Denies N/V/F/C.     Allergies    No Known Drug Allergies  shellfish (Angioedema)    Intolerances        Vital Signs Last 24 Hrs  T(C): 37.2 (13 Mar 2021 06:10), Max: 37.2 (12 Mar 2021 18:58)  T(F): 98.9 (13 Mar 2021 06:10), Max: 99 (12 Mar 2021 18:58)  HR: 78 (13 Mar 2021 06:10) (60 - 89)  BP: 151/68 (13 Mar 2021 06:10) (151/68 - 180/80)  BP(mean): --  RR: 17 (13 Mar 2021 06:10) (17 - 18)  SpO2: 95% (13 Mar 2021 06:10) (95% - 97%)    LABS:                        7.8    12.92 )-----------( 252      ( 13 Mar 2021 07:49 )             23.1     03-12    134<L>  |  103  |  26<H>  ----------------------------<  225<H>  4.5   |  24  |  1.32<H>    Ca    8.8      12 Mar 2021 08:52  Mg     1.8     03-12    TPro  7.1  /  Alb  2.0<L>  /  TBili  0.4  /  DBili  x   /  AST  14  /  ALT  14  /  AlkPhos  121<H>  03-12    PT/INR - ( 11 Mar 2021 17:54 )   PT: 14.2 sec;   INR: 1.26 ratio         PTT - ( 11 Mar 2021 17:54 )  PTT:31.6 sec    CAPILLARY BLOOD GLUCOSE      POCT Blood Glucose.: 183 mg/dL (13 Mar 2021 06:18)  POCT Blood Glucose.: 209 mg/dL (12 Mar 2021 22:10)  POCT Blood Glucose.: 215 mg/dL (12 Mar 2021 16:55)  POCT Blood Glucose.: 218 mg/dL (12 Mar 2021 12:11)      LOWER EXTREMITY PHYSICAL EXAM:    Vascular: DP/PT 2/4 B/L, CFT <3 seconds B/L, Temperature gradient warm to cool B/L  Neuro: Epicritic sensation dimished to the level of ankle B/L  Musculoskeletal/Ortho: unremarkable  Skin: left foot dorsal 5th met wound to bone, tracking up extensor tendon, 3+ edema, scant purulence, mild malodor, plantar blister, etiology: diabetic neuropathy   
    SUBJECTIVE / OVERNIGHT EVENTS: pt seen and examined    MEDICATIONS  (STANDING):  amLODIPine   Tablet 10 milliGRAM(s) Oral daily  carvedilol 12.5 milliGRAM(s) Oral every 12 hours  collagenase Ointment 1 Application(s) Topical daily  dextrose 40% Gel 15 Gram(s) Oral once  dextrose 5%. 1000 milliLiter(s) (50 mL/Hr) IV Continuous <Continuous>  dextrose 5%. 1000 milliLiter(s) (100 mL/Hr) IV Continuous <Continuous>  dextrose 50% Injectable 25 Gram(s) IV Push once  dextrose 50% Injectable 12.5 Gram(s) IV Push once  dextrose 50% Injectable 25 Gram(s) IV Push once  glucagon  Injectable 1 milliGRAM(s) IntraMuscular once  insulin glargine Injectable (LANTUS) 18 Unit(s) SubCutaneous at bedtime  insulin lispro (ADMELOG) corrective regimen sliding scale   SubCutaneous three times a day before meals  insulin lispro (ADMELOG) corrective regimen sliding scale   SubCutaneous at bedtime  insulin lispro Injectable (ADMELOG) 4 Unit(s) SubCutaneous three times a day before meals  lactated ringers. 1000 milliLiter(s) (75 mL/Hr) IV Continuous <Continuous>  piperacillin/tazobactam IVPB.. 3.375 Gram(s) IV Intermittent every 8 hours  polyethylene glycol 3350 17 Gram(s) Oral daily  senna 2 Tablet(s) Oral at bedtime    MEDICATIONS  (PRN):  acetaminophen   Tablet .. 650 milliGRAM(s) Oral every 6 hours PRN Mild Pain (1 - 3)  oxycodone    5 mG/acetaminophen 325 mG 1 Tablet(s) Oral every 4 hours PRN Moderate Pain (4 - 6)    Vital Signs Last 24 Hrs  T(C): 36.9 (19 Mar 2021 14:15), Max: 37.1 (18 Mar 2021 21:05)  T(F): 98.5 (19 Mar 2021 14:15), Max: 98.7 (18 Mar 2021 21:05)  HR: 75 (19 Mar 2021 16:57) (63 - 75)  BP: 168/62 (19 Mar 2021 16:57) (133/65 - 168/62)  BP(mean): --  RR: 17 (19 Mar 2021 14:15) (17 - 18)  SpO2: 100% (19 Mar 2021 14:15) (98% - 100%)  Constitutional: No fever, fatigue  Skin: No rash.  Eyes: No recent vision problems or eye pain.  ENT: No congestion, ear pain, or sore throat.  Cardiovascular: No chest pain or palpation.  Respiratory: No cough, shortness of breath, congestion, or wheezing.  Gastrointestinal: No abdominal pain, nausea, vomiting, or diarrhea.  Genitourinary: No dysuria.  Musculoskeletal: No joint swelling.  Neurologic: No headache.    PHYSICAL EXAM:  GENERAL: NAD  EYES: EOMI, PERRLA  NECK: Supple, No JVD  CHEST/LUNG: dec breath sounds at bases  HEART:  S1 , S2 +  ABDOMEN: soft , bs+  EXTREMITIES:  edema+, foot dressing+  NEUROLOGY:alert awake oriented     LABS:  03-19    138  |  105  |  19  ----------------------------<  131<H>  3.7   |  26  |  1.46<H>    Ca    8.4      19 Mar 2021 07:06  Phos  3.2     03-19  Mg     2.0     03-19    TPro  7.6  /  Alb  2.5<L>  /  TBili  0.2  /  DBili      /  AST  30  /  ALT  23  /  AlkPhos  102  03-19    Creatinine Trend: 1.46 <--, 1.47 <--, 1.45 <--, 1.60 <--, 1.77 <--, 1.45 <--, 1.37 <--                        7.5    10.38 )-----------( 289      ( 19 Mar 2021 07:06 )             22.6     Urine Studies:            LIVER FUNCTIONS - ( 19 Mar 2021 07:06 )  Alb: 2.5 g/dL / Pro: 7.6 g/dL / ALK PHOS: 102 U/L / ALT: 23 U/L / AST: 30 U/L / GGT: x               LIVER FUNCTIONS - ( 18 Mar 2021 07:58 )  Alb: 2.5 g/dL / Pro: 7.5 g/dL / ALK PHOS: 110 U/L / ALT: 19 U/L / AST: 26 U/L / GGT: x             
    SUBJECTIVE / OVERNIGHT EVENTS: pt seen and examined    MEDICATIONS  (STANDING):  amLODIPine   Tablet 10 milliGRAM(s) Oral daily  carvedilol 6.25 milliGRAM(s) Oral every 12 hours  collagenase Ointment 1 Application(s) Topical daily  dextrose 40% Gel 15 Gram(s) Oral once  dextrose 5%. 1000 milliLiter(s) (50 mL/Hr) IV Continuous <Continuous>  dextrose 5%. 1000 milliLiter(s) (100 mL/Hr) IV Continuous <Continuous>  dextrose 50% Injectable 25 Gram(s) IV Push once  dextrose 50% Injectable 12.5 Gram(s) IV Push once  dextrose 50% Injectable 25 Gram(s) IV Push once  glucagon  Injectable 1 milliGRAM(s) IntraMuscular once  insulin glargine Injectable (LANTUS) 18 Unit(s) SubCutaneous at bedtime  insulin lispro (ADMELOG) corrective regimen sliding scale   SubCutaneous three times a day before meals  insulin lispro (ADMELOG) corrective regimen sliding scale   SubCutaneous at bedtime  insulin lispro Injectable (ADMELOG) 4 Unit(s) SubCutaneous three times a day before meals  lactated ringers. 1000 milliLiter(s) (75 mL/Hr) IV Continuous <Continuous>  piperacillin/tazobactam IVPB.. 3.375 Gram(s) IV Intermittent every 8 hours  polyethylene glycol 3350 17 Gram(s) Oral daily  senna 2 Tablet(s) Oral at bedtime    MEDICATIONS  (PRN):  acetaminophen   Tablet .. 650 milliGRAM(s) Oral every 6 hours PRN Mild Pain (1 - 3)  oxycodone    5 mG/acetaminophen 325 mG 1 Tablet(s) Oral every 4 hours PRN Moderate Pain (4 - 6)    Vital Signs Last 24 Hrs  T(C): 37.1 (17 Mar 2021 12:30), Max: 37.1 (17 Mar 2021 12:30)  T(F): 98.8 (17 Mar 2021 12:30), Max: 98.8 (17 Mar 2021 12:30)  HR: 75 (17 Mar 2021 17:37) (69 - 75)  BP: 158/62 (17 Mar 2021 17:37) (136/72 - 167/64)  BP(mean): --  RR: 18 (17 Mar 2021 12:30) (17 - 18)  SpO2: 98% (17 Mar 2021 12:30) (98% - 98%)    Constitutional: No fever, fatigue  Skin: No rash.  Eyes: No recent vision problems or eye pain.  ENT: No congestion, ear pain, or sore throat.  Cardiovascular: No chest pain or palpation.  Respiratory: No cough, shortness of breath, congestion, or wheezing.  Gastrointestinal: No abdominal pain, nausea, vomiting, or diarrhea.  Genitourinary: No dysuria.  Musculoskeletal: No joint swelling.  Neurologic: No headache.    PHYSICAL EXAM:  GENERAL: NAD  EYES: EOMI, PERRLA  NECK: Supple, No JVD  CHEST/LUNG: dec breath sounds at bases  HEART:  S1 , S2 +  ABDOMEN: soft , bs+  EXTREMITIES:  edema+, foot dressing+  NEUROLOGY:alert awake oriented     LABS:  03-17    139  |  104  |  18  ----------------------------<  125<H>  3.8   |  26  |  1.45<H>    Ca    8.7      17 Mar 2021 07:36  Phos  3.0     03-17  Mg     1.9     03-17    TPro  7.5  /  Alb  2.3<L>  /  TBili  0.3  /  DBili      /  AST  21  /  ALT  14  /  AlkPhos  117  03-17    Creatinine Trend: 1.45 <--, 1.60 <--, 1.77 <--, 1.45 <--, 1.37 <--, 1.32 <--, 1.53 <--                        7.7    10.68 )-----------( 288      ( 17 Mar 2021 07:36 )             23.0     Urine Studies:            LIVER FUNCTIONS - ( 17 Mar 2021 07:36 )  Alb: 2.3 g/dL / Pro: 7.5 g/dL / ALK PHOS: 117 U/L / ALT: 14 U/L / AST: 21 U/L / GGT: x               Alb: 2.5 g/dL / Pro: 7.5 g/dL / ALK PHOS: 127 U/L / ALT: 13 U/L / AST: 11 U/L / GGT: x           PT/INR - ( 14 Mar 2021 07:35 )   PT: 14.3 sec;   INR: 1.27 ratio         PTT - ( 14 Mar 2021 07:35 )  PTT:30.4 sec  
  Fady Mckeon MD  Interventional Cardiology / Endovascular Specialist  [place] Office : [phone] [place] [address] [place] [address]  Tel:   [place] Office : [address] [facility] [place] [address]  Tel: [phone]  Cell : [phone]      Patient seen and evaluated at bedside.  Pt is resting comfortable in NAD. Denies N/V/F/C  	  MEDICATIONS:  hydrALAZINE 10 milliGRAM(s) Oral three times a day  piperacillin/tazobactam IVPB.. 3.375 Gram(s) IV Intermittent every 8 hours  acetaminophen   Tablet .. 650 milliGRAM(s) Oral every 6 hours PRN  HYDROmorphone  Injectable 0.5 milliGRAM(s) IV Push every 4 hours PRN  oxycodone    5 mG/acetaminophen 325 mG 1 Tablet(s) Oral every 4 hours PRN  polyethylene glycol 3350 17 Gram(s) Oral daily  senna 2 Tablet(s) Oral at bedtime  dextrose 40% Gel 15 Gram(s) Oral once  dextrose 50% Injectable 25 Gram(s) IV Push once  dextrose 50% Injectable 12.5 Gram(s) IV Push once  dextrose 50% Injectable 25 Gram(s) IV Push once  glucagon  Injectable 1 milliGRAM(s) IntraMuscular once  insulin glargine Injectable (LANTUS) 18 Unit(s) SubCutaneous at bedtime  insulin lispro (ADMELOG) corrective regimen sliding scale   SubCutaneous three times a day before meals  insulin lispro (ADMELOG) corrective regimen sliding scale   SubCutaneous at bedtime  insulin lispro Injectable (ADMELOG) 4 Unit(s) SubCutaneous three times a day before meals  dextrose 5%. 1000 milliLiter(s) IV Continuous <Continuous>  dextrose 5%. 1000 milliLiter(s) IV Continuous <Continuous>  lactated ringers. 1000 milliLiter(s) IV Continuous <Continuous>      PAST MEDICAL/SURGICAL HISTORY  PAST MEDICAL & SURGICAL HISTORY:  Diabetes    No significant past surgical history        SOCIAL HISTORY: Substance Use (street drugs): ( x ) never used  (  ) other:    FAMILY HISTORY:  No pertinent family history in first degree relatives        REVIEW OF SYSTEMS:  CONSTITUTIONAL: No fever, weight loss, or fatigue  EYES: No eye pain, visual disturbances, or discharge  ENMT:  No difficulty hearing, tinnitus, vertigo; No sinus or throat pain  BREASTS: No pain, masses, or nipple discharge  GASTROINTESTINAL: No abdominal or epigastric pain. No nausea, vomiting, or hematemesis; No diarrhea or constipation. No melena or hematochezia.  GENITOURINARY: No dysuria, frequency, hematuria, or incontinence  NEUROLOGICAL: No headaches, memory loss, loss of strength, numbness, or tremors  ENDOCRINE: No heat or cold intolerance; No hair loss  MUSCULOSKELETAL: No joint pain or swelling; No muscle, back, or extremity pain  PSYCHIATRIC: No depression, anxiety, mood swings, or difficulty sleeping  HEME/LYMPH: No easy bruising, or bleeding gums  All others negative    PHYSICAL EXAM:  T(C): 36.7 (03-15-21 @ 11:34), Max: 36.7 (03-14-21 @ 21:31)  HR: 75 (03-15-21 @ 15:08) (69 - 78)  BP: 135/64 (03-15-21 @ 15:08) (128/70 - 154/85)  RR: 16 (03-15-21 @ 11:34) (16 - 17)  SpO2: 97% (03-15-21 @ 11:34) (95% - 97%)  Wt(kg): --  I&O's Summary    14 Mar 2021 07:01  -  15 Mar 2021 07:00  --------------------------------------------------------  IN: 390 mL / OUT: 0 mL / NET: 390 mL      EYES: EOMI, PERRLA, conjunctiva and sclera clear  ENMT: No tonsillar erythema, exudates, or enlargement; Moist mucous membranes, Good dentition, No lesions  Cardiovascular: Normal S1 S2, No JVD, No murmurs, No edema  Respiratory: Lungs clear to auscultation	  Gastrointestinal:  Soft, Non-tender, + BS	  Extremities: left foot covered                              7.6    12.31 )-----------( 227      ( 15 Mar 2021 07:36 )             22.5     03-15    138  |  103  |  22  ----------------------------<  183<H>  4.2   |  29  |  1.77<H>    Ca    8.4      15 Mar 2021 07:36  Phos  3.7     03-15  Mg     1.9     03-15    TPro  7.5  /  Alb  2.5<L>  /  TBili  0.3  /  DBili  x   /  AST  11  /  ALT  13  /  AlkPhos  127<H>  03-15    proBNP:   Lipid Profile:   HgA1c:   TSH:     Consultant(s) Notes Reviewed:  [x ] YES  [ ] NO    Care Discussed with Consultants/Other Providers [ x] YES  [ ] NO    Imaging Personally Reviewed independently:  [x] YES  [ ] NO    All labs, radiologic studies, vitals, orders and medications list reviewed. Patient is seen and examined at bedside. Case discussed with medical team.              
    SUBJECTIVE / OVERNIGHT EVENTS: pt seen and examined    MEDICATIONS  (STANDING):  dextrose 40% Gel 15 Gram(s) Oral once  dextrose 5%. 1000 milliLiter(s) (50 mL/Hr) IV Continuous <Continuous>  dextrose 5%. 1000 milliLiter(s) (100 mL/Hr) IV Continuous <Continuous>  dextrose 50% Injectable 25 Gram(s) IV Push once  dextrose 50% Injectable 12.5 Gram(s) IV Push once  dextrose 50% Injectable 25 Gram(s) IV Push once  glucagon  Injectable 1 milliGRAM(s) IntraMuscular once  insulin glargine Injectable (LANTUS) 15 Unit(s) SubCutaneous at bedtime  insulin lispro (ADMELOG) corrective regimen sliding scale   SubCutaneous every 6 hours  lactated ringers. 1000 milliLiter(s) (75 mL/Hr) IV Continuous <Continuous>  piperacillin/tazobactam IVPB.. 3.375 Gram(s) IV Intermittent every 8 hours    MEDICATIONS  (PRN):    Vital Signs Last 24 Hrs  T(C): 36.8 (13 Mar 2021 08:33), Max: 37.2 (12 Mar 2021 18:58)  T(F): 98.3 (13 Mar 2021 08:33), Max: 99 (12 Mar 2021 18:58)  HR: 81 (13 Mar 2021 08:33) (60 - 89)  BP: 166/86 (13 Mar 2021 08:33) (151/68 - 180/80)  BP(mean): --  RR: 17 (13 Mar 2021 08:33) (17 - 18)  SpO2: 96% (13 Mar 2021 08:33) (95% - 97%)      Constitutional: No fever, fatigue  Skin: No rash.  Eyes: No recent vision problems or eye pain.  ENT: No congestion, ear pain, or sore throat.  Cardiovascular: No chest pain or palpation.  Respiratory: No cough, shortness of breath, congestion, or wheezing.  Gastrointestinal: No abdominal pain, nausea, vomiting, or diarrhea.  Genitourinary: No dysuria.  Musculoskeletal: No joint swelling.  Neurologic: No headache.    PHYSICAL EXAM:  GENERAL: NAD  EYES: EOMI, PERRLA  NECK: Supple, No JVD  CHEST/LUNG: dec breath sounds at bases  HEART:  S1 , S2 +  ABDOMEN: soft , bs+  EXTREMITIES:  edema+, foot dressing+  NEUROLOGY:alert awake oriented     LABS:  03-13    136  |  103  |  20  ----------------------------<  171<H>  3.8   |  27  |  1.37<H>    Ca    8.8      13 Mar 2021 07:49  Phos  3.3     03-13  Mg     1.7     03-13    TPro  7.3  /  Alb  2.2<L>  /  TBili  0.4  /  DBili      /  AST  17  /  ALT  14  /  AlkPhos  126<H>  03-13    Creatinine Trend: 1.37 <--, 1.32 <--, 1.53 <--                        7.8    12.92 )-----------( 252      ( 13 Mar 2021 07:49 )             23.1     Urine Studies:            LIVER FUNCTIONS - ( 13 Mar 2021 07:49 )  Alb: 2.2 g/dL / Pro: 7.3 g/dL / ALK PHOS: 126 U/L / ALT: 14 U/L / AST: 17 U/L / GGT: x           PT/INR - ( 11 Mar 2021 17:54 )   PT: 14.2 sec;   INR: 1.26 ratio         PTT - ( 11 Mar 2021 17:54 )  PTT:31.6 sec  
    SUBJECTIVE / OVERNIGHT EVENTS: pt seen and examined    MEDICATIONS  (STANDING):  dextrose 40% Gel 15 Gram(s) Oral once  dextrose 5%. 1000 milliLiter(s) (50 mL/Hr) IV Continuous <Continuous>  dextrose 5%. 1000 milliLiter(s) (100 mL/Hr) IV Continuous <Continuous>  dextrose 50% Injectable 25 Gram(s) IV Push once  dextrose 50% Injectable 12.5 Gram(s) IV Push once  dextrose 50% Injectable 25 Gram(s) IV Push once  glucagon  Injectable 1 milliGRAM(s) IntraMuscular once  hydrALAZINE 10 milliGRAM(s) Oral three times a day  insulin glargine Injectable (LANTUS) 18 Unit(s) SubCutaneous at bedtime  insulin lispro (ADMELOG) corrective regimen sliding scale   SubCutaneous three times a day before meals  insulin lispro (ADMELOG) corrective regimen sliding scale   SubCutaneous at bedtime  insulin lispro Injectable (ADMELOG) 4 Unit(s) SubCutaneous three times a day before meals  lactated ringers. 1000 milliLiter(s) (75 mL/Hr) IV Continuous <Continuous>  piperacillin/tazobactam IVPB.. 3.375 Gram(s) IV Intermittent every 8 hours  polyethylene glycol 3350 17 Gram(s) Oral daily  senna 2 Tablet(s) Oral at bedtime    MEDICATIONS  (PRN):  acetaminophen   Tablet .. 650 milliGRAM(s) Oral every 6 hours PRN Mild Pain (1 - 3)  HYDROmorphone  Injectable 0.5 milliGRAM(s) IV Push every 4 hours PRN Severe Pain (7 - 10)  oxycodone    5 mG/acetaminophen 325 mG 1 Tablet(s) Oral every 4 hours PRN Moderate Pain (4 - 6)    Vital Signs Last 24 Hrs  T(C): 37.1 (15 Mar 2021 21:14), Max: 37.1 (15 Mar 2021 21:14)  T(F): 98.7 (15 Mar 2021 21:14), Max: 98.7 (15 Mar 2021 21:14)  HR: 80 (15 Mar 2021 21:14) (69 - 80)  BP: 164/71 (15 Mar 2021 21:14) (132/67 - 164/71)  BP(mean): --  RR: 17 (15 Mar 2021 21:14) (16 - 17)  SpO2: 97% (15 Mar 2021 21:14) (95% - 97%)    Constitutional: No fever, fatigue  Skin: No rash.  Eyes: No recent vision problems or eye pain.  ENT: No congestion, ear pain, or sore throat.  Cardiovascular: No chest pain or palpation.  Respiratory: No cough, shortness of breath, congestion, or wheezing.  Gastrointestinal: No abdominal pain, nausea, vomiting, or diarrhea.  Genitourinary: No dysuria.  Musculoskeletal: No joint swelling.  Neurologic: No headache.    PHYSICAL EXAM:  GENERAL: NAD  EYES: EOMI, PERRLA  NECK: Supple, No JVD  CHEST/LUNG: dec breath sounds at bases  HEART:  S1 , S2 +  ABDOMEN: soft , bs+  EXTREMITIES:  edema+, foot dressing+  NEUROLOGY:alert awake oriented     LABS:  03-15    138  |  103  |  22  ----------------------------<  183<H>  4.2   |  29  |  1.77<H>    Ca    8.4      15 Mar 2021 07:36  Phos  3.7     03-15  Mg     1.9     03-15    TPro  7.5  /  Alb  2.5<L>  /  TBili  0.3  /  DBili      /  AST  11  /  ALT  13  /  AlkPhos  127<H>  03-15    Creatinine Trend: 1.77 <--, 1.45 <--, 1.37 <--, 1.32 <--, 1.53 <--                        7.6    12.31 )-----------( 227      ( 15 Mar 2021 07:36 )             22.5     Urine Studies:            LIVER FUNCTIONS - ( 15 Mar 2021 07:36 )  Alb: 2.5 g/dL / Pro: 7.5 g/dL / ALK PHOS: 127 U/L / ALT: 13 U/L / AST: 11 U/L / GGT: x           PT/INR - ( 14 Mar 2021 07:35 )   PT: 14.3 sec;   INR: 1.27 ratio         PTT - ( 14 Mar 2021 07:35 )  PTT:30.4 sec  
    SUBJECTIVE / OVERNIGHT EVENTS: pt seen and examined    MEDICATIONS  (STANDING):  dextrose 40% Gel 15 Gram(s) Oral once  dextrose 5%. 1000 milliLiter(s) (50 mL/Hr) IV Continuous <Continuous>  dextrose 5%. 1000 milliLiter(s) (100 mL/Hr) IV Continuous <Continuous>  dextrose 50% Injectable 25 Gram(s) IV Push once  dextrose 50% Injectable 12.5 Gram(s) IV Push once  dextrose 50% Injectable 25 Gram(s) IV Push once  glucagon  Injectable 1 milliGRAM(s) IntraMuscular once  insulin glargine Injectable (LANTUS) 15 Unit(s) SubCutaneous at bedtime  insulin lispro (ADMELOG) corrective regimen sliding scale   SubCutaneous every 6 hours  lactated ringers. 1000 milliLiter(s) (75 mL/Hr) IV Continuous <Continuous>  piperacillin/tazobactam IVPB.. 3.375 Gram(s) IV Intermittent every 8 hours    MEDICATIONS  (PRN):    Vital Signs Last 24 Hrs  T(C): 36.8 (13 Mar 2021 08:33), Max: 37.2 (12 Mar 2021 18:58)  T(F): 98.3 (13 Mar 2021 08:33), Max: 99 (12 Mar 2021 18:58)  HR: 81 (13 Mar 2021 08:33) (60 - 89)  BP: 166/86 (13 Mar 2021 08:33) (151/68 - 180/80)  BP(mean): --  RR: 17 (13 Mar 2021 08:33) (17 - 18)  SpO2: 96% (13 Mar 2021 08:33) (95% - 97%)      Constitutional: No fever, fatigue  Skin: No rash.  Eyes: No recent vision problems or eye pain.  ENT: No congestion, ear pain, or sore throat.  Cardiovascular: No chest pain or palpation.  Respiratory: No cough, shortness of breath, congestion, or wheezing.  Gastrointestinal: No abdominal pain, nausea, vomiting, or diarrhea.  Genitourinary: No dysuria.  Musculoskeletal: No joint swelling.  Neurologic: No headache.    PHYSICAL EXAM:  GENERAL: NAD  EYES: EOMI, PERRLA  NECK: Supple, No JVD  CHEST/LUNG: dec breath sounds at bases  HEART:  S1 , S2 +  ABDOMEN: soft , bs+  EXTREMITIES:  edema+, foot dressing+  NEUROLOGY:alert awake oriented     LABS:  03-13    136  |  103  |  20  ----------------------------<  171<H>  3.8   |  27  |  1.37<H>    Ca    8.8      13 Mar 2021 07:49  Phos  3.3     03-13  Mg     1.7     03-13    TPro  7.3  /  Alb  2.2<L>  /  TBili  0.4  /  DBili      /  AST  17  /  ALT  14  /  AlkPhos  126<H>  03-13    Creatinine Trend: 1.37 <--, 1.32 <--, 1.53 <--                        7.8    12.92 )-----------( 252      ( 13 Mar 2021 07:49 )             23.1     Urine Studies:            LIVER FUNCTIONS - ( 13 Mar 2021 07:49 )  Alb: 2.2 g/dL / Pro: 7.3 g/dL / ALK PHOS: 126 U/L / ALT: 14 U/L / AST: 17 U/L / GGT: x           PT/INR - ( 11 Mar 2021 17:54 )   PT: 14.2 sec;   INR: 1.26 ratio         PTT - ( 11 Mar 2021 17:54 )  PTT:31.6 sec  
    SUBJECTIVE / OVERNIGHT EVENTS: pt seen and examined    MEDICATIONS  (STANDING):  amLODIPine   Tablet 10 milliGRAM(s) Oral daily  carvedilol 3.125 milliGRAM(s) Oral every 12 hours  collagenase Ointment 1 Application(s) Topical daily  dextrose 40% Gel 15 Gram(s) Oral once  dextrose 5%. 1000 milliLiter(s) (50 mL/Hr) IV Continuous <Continuous>  dextrose 5%. 1000 milliLiter(s) (100 mL/Hr) IV Continuous <Continuous>  dextrose 50% Injectable 25 Gram(s) IV Push once  dextrose 50% Injectable 12.5 Gram(s) IV Push once  dextrose 50% Injectable 25 Gram(s) IV Push once  glucagon  Injectable 1 milliGRAM(s) IntraMuscular once  insulin glargine Injectable (LANTUS) 18 Unit(s) SubCutaneous at bedtime  insulin lispro (ADMELOG) corrective regimen sliding scale   SubCutaneous three times a day before meals  insulin lispro (ADMELOG) corrective regimen sliding scale   SubCutaneous at bedtime  insulin lispro Injectable (ADMELOG) 4 Unit(s) SubCutaneous three times a day before meals  lactated ringers. 1000 milliLiter(s) (75 mL/Hr) IV Continuous <Continuous>  piperacillin/tazobactam IVPB.. 3.375 Gram(s) IV Intermittent every 8 hours  polyethylene glycol 3350 17 Gram(s) Oral daily  senna 2 Tablet(s) Oral at bedtime    MEDICATIONS  (PRN):  acetaminophen   Tablet .. 650 milliGRAM(s) Oral every 6 hours PRN Mild Pain (1 - 3)  HYDROmorphone  Injectable 0.5 milliGRAM(s) IV Push every 4 hours PRN Severe Pain (7 - 10)  oxycodone    5 mG/acetaminophen 325 mG 1 Tablet(s) Oral every 4 hours PRN Moderate Pain (4 - 6)    Vital Signs Last 24 Hrs  T(C): 36.6 (16 Mar 2021 21:00), Max: 36.9 (16 Mar 2021 13:00)  T(F): 97.9 (16 Mar 2021 21:00), Max: 98.4 (16 Mar 2021 13:00)  HR: 72 (16 Mar 2021 21:00) (68 - 81)  BP: 172/77 (16 Mar 2021 21:00) (137/67 - 172/77)  BP(mean): --  RR: 17 (16 Mar 2021 21:00) (16 - 17)  SpO2: 98% (16 Mar 2021 21:00) (98% - 98%)    Constitutional: No fever, fatigue  Skin: No rash.  Eyes: No recent vision problems or eye pain.  ENT: No congestion, ear pain, or sore throat.  Cardiovascular: No chest pain or palpation.  Respiratory: No cough, shortness of breath, congestion, or wheezing.  Gastrointestinal: No abdominal pain, nausea, vomiting, or diarrhea.  Genitourinary: No dysuria.  Musculoskeletal: No joint swelling.  Neurologic: No headache.    PHYSICAL EXAM:  GENERAL: NAD  EYES: EOMI, PERRLA  NECK: Supple, No JVD  CHEST/LUNG: dec breath sounds at bases  HEART:  S1 , S2 +  ABDOMEN: soft , bs+  EXTREMITIES:  edema+, foot dressing+  NEUROLOGY:alert awake oriented     LABS:  03-16    139  |  103  |  20  ----------------------------<  140<H>  3.9   |  29  |  1.60<H>    Ca    8.6      16 Mar 2021 08:10  Phos  3.4     03-16  Mg     1.9     03-16    TPro  7.6  /  Alb  2.5<L>  /  TBili  0.2  /  DBili      /  AST  13  /  ALT  15  /  AlkPhos  131<H>  03-16    Creatinine Trend: 1.60 <--, 1.77 <--, 1.45 <--, 1.37 <--, 1.32 <--, 1.53 <--                        7.2    10.79 )-----------( 251      ( 16 Mar 2021 08:10 )             21.6     Urine Studies:            LIVER FUNCTIONS - ( 16 Mar 2021 08:10 )  Alb: 2.5 g/dL / Pro: 7.6 g/dL / ALK PHOS: 131 U/L / ALT: 15 U/L / AST: 13 U/L / GGT: x             Alb: 2.5 g/dL / Pro: 7.5 g/dL / ALK PHOS: 127 U/L / ALT: 13 U/L / AST: 11 U/L / GGT: x           PT/INR - ( 14 Mar 2021 07:35 )   PT: 14.3 sec;   INR: 1.27 ratio         PTT - ( 14 Mar 2021 07:35 )  PTT:30.4 sec  
    SUBJECTIVE / OVERNIGHT EVENTS: pt seen and examined    MEDICATIONS  (STANDING):  amLODIPine   Tablet 10 milliGRAM(s) Oral daily  carvedilol 6.25 milliGRAM(s) Oral every 12 hours  collagenase Ointment 1 Application(s) Topical daily  dextrose 40% Gel 15 Gram(s) Oral once  dextrose 5%. 1000 milliLiter(s) (100 mL/Hr) IV Continuous <Continuous>  dextrose 5%. 1000 milliLiter(s) (50 mL/Hr) IV Continuous <Continuous>  dextrose 50% Injectable 25 Gram(s) IV Push once  dextrose 50% Injectable 12.5 Gram(s) IV Push once  dextrose 50% Injectable 25 Gram(s) IV Push once  glucagon  Injectable 1 milliGRAM(s) IntraMuscular once  insulin glargine Injectable (LANTUS) 18 Unit(s) SubCutaneous at bedtime  insulin lispro (ADMELOG) corrective regimen sliding scale   SubCutaneous three times a day before meals  insulin lispro (ADMELOG) corrective regimen sliding scale   SubCutaneous at bedtime  insulin lispro Injectable (ADMELOG) 4 Unit(s) SubCutaneous three times a day before meals  lactated ringers. 1000 milliLiter(s) (75 mL/Hr) IV Continuous <Continuous>  piperacillin/tazobactam IVPB.. 3.375 Gram(s) IV Intermittent every 8 hours  polyethylene glycol 3350 17 Gram(s) Oral daily  senna 2 Tablet(s) Oral at bedtime    MEDICATIONS  (PRN):  acetaminophen   Tablet .. 650 milliGRAM(s) Oral every 6 hours PRN Mild Pain (1 - 3)  oxycodone    5 mG/acetaminophen 325 mG 1 Tablet(s) Oral every 4 hours PRN Moderate Pain (4 - 6)    Vital Signs Last 24 Hrs  T(C): 37.1 (18 Mar 2021 21:05), Max: 37.1 (18 Mar 2021 08:40)  T(F): 98.7 (18 Mar 2021 21:05), Max: 98.7 (18 Mar 2021 08:40)  HR: 63 (18 Mar 2021 21:05) (63 - 77)  BP: 144/68 (18 Mar 2021 21:05) (127/60 - 144/68)  BP(mean): --  RR: 17 (18 Mar 2021 21:05) (17 - 18)  SpO2: 98% (18 Mar 2021 21:05) (97% - 98%)    Constitutional: No fever, fatigue  Skin: No rash.  Eyes: No recent vision problems or eye pain.  ENT: No congestion, ear pain, or sore throat.  Cardiovascular: No chest pain or palpation.  Respiratory: No cough, shortness of breath, congestion, or wheezing.  Gastrointestinal: No abdominal pain, nausea, vomiting, or diarrhea.  Genitourinary: No dysuria.  Musculoskeletal: No joint swelling.  Neurologic: No headache.    PHYSICAL EXAM:  GENERAL: NAD  EYES: EOMI, PERRLA  NECK: Supple, No JVD  CHEST/LUNG: dec breath sounds at bases  HEART:  S1 , S2 +  ABDOMEN: soft , bs+  EXTREMITIES:  edema+, foot dressing+  NEUROLOGY:alert awake oriented     LABS:  03-18    139  |  104  |  19  ----------------------------<  179<H>  3.8   |  29  |  1.47<H>    Ca    8.5      18 Mar 2021 07:58  Phos  3.4     03-18  Mg     2.0     03-18    TPro  7.5  /  Alb  2.5<L>  /  TBili  0.2  /  DBili      /  AST  26  /  ALT  19  /  AlkPhos  110  03-18    Creatinine Trend: 1.47 <--, 1.45 <--, 1.60 <--, 1.77 <--, 1.45 <--, 1.37 <--, 1.32 <--                        7.6    9.73  )-----------( 286      ( 18 Mar 2021 07:58 )             22.8     Urine Studies:            LIVER FUNCTIONS - ( 18 Mar 2021 07:58 )  Alb: 2.5 g/dL / Pro: 7.5 g/dL / ALK PHOS: 110 U/L / ALT: 19 U/L / AST: 26 U/L / GGT: x

## 2021-03-19 NOTE — PROGRESS NOTE ADULT - PROBLEM SELECTOR PLAN 1
- C/w abx zosyn for now.  - F/u cultures.  - Surgery plan per podiatry.- pt declined initially then today agreed
- abx as per ID  - F/u cultures.  - s/p LF partial 5th ray resection left open DOS 3/14/21
- C/w abx zosyn for now.  - F/u cultures.  - Surgery plan per podiatry.- pt declined initially then today agreed
- abx as per ID  ID cleared pt for dc   - s/p LF partial 5th ray resection left open DOS 3/14/21
- C/w abx zosyn for now.  - F/u cultures.  - s/p LF partial 5th ray resection left open DOS 3/14/21
- C/w abx zosyn for now.  - F/u cultures.  - s/p LF partial 5th ray resection left open DOS 3/14/21
- C/w abx zosyn for now.  - F/u cultures.  - Surgery plan per podiatry.
- C/w abx zosyn for now.  - F/u cultures.  - s/p LF partial 5th ray resection left open DOS 3/14/21

## 2021-03-19 NOTE — PROGRESS NOTE ADULT - ASSESSMENT
56/M with PMH DM (HbA1c 12.2).  P/w worsening lt foot pain, swelling discharge X 1 week. No systemic symptoms, prior trauma  In ED, afebrile with elevated BP. Labs revealed WBC 14,900, Hb 8.2, BUN 34, Cr 1.53, hyperglycemic.   Eval by Podiatry in ER - left foot dorsal 5th met wound to bone, tracking up extensor tendon, 3+ edema, scant purulence, mild malodor, plantar blister. I&D performed, was planned for Lt foot partial 5th ray resection 3/12, but pt refused  ID consulted for abx recs  s/p ray resection-high concern for residual OM  Cx as above   Clean margins growing GBS as well  MRI as above   Refusing IV abx  Rec:  A) Toe cellulitis/OM  stable  will need long term abx  Options including PICC vs po d/w patient. Explained IV abx superior but he is refusing  Given this can switch to po augmentin 875 mg PO BID x 6 weeks totral  Explained that if any worsening may need further surgical intervention   can follow in ID clinic in 4-6 weeks after DC  wound acre per podiatry     B) DM  optimized DM control to promote wound healing      Will defer to primary team on management of other issues.  Assessment, plan and recommendations as detailed above were discussed with the medical/primary  team.  ID will follow as needed during hospital stay ,please call  if any questions or issues.  Pt to follow in ID clinic 4-6 weeks after Dc

## 2021-03-19 NOTE — PROGRESS NOTE ADULT - PROBLEM SELECTOR PROBLEM 3
Essential hypertension
Hyperlipidemia with target LDL less than 70
Essential hypertension

## 2021-03-19 NOTE — PROGRESS NOTE ADULT - REASON FOR ADMISSION
Foot infection

## 2021-03-19 NOTE — PROGRESS NOTE ADULT - ASSESSMENT
55 y/o s/p LF partial 5th ray resection left open DOS 3/14/21    - pt seen and evaluated  - WBC trending down to 10  - s/p LF partial 5th ray resection left open, surgical wound is fibrogranular w/ improved granulation, no drainage noted, no pus, no surrounding erythema, no malodor, no acute signs of infection. Wound probes laterally 2cm  - Left foot MR showed no OM to 4th met, stress reaction to 5th met stump   - Continue w/ wound vac  - Pt stable for discharge, ID recs appreciated for 6 weeks PO Augmentin  -Follow up information in discharge note provider  - Discussed w/ attending

## 2021-03-19 NOTE — PROGRESS NOTE ADULT - PROBLEM SELECTOR PROBLEM 2
Type 2 diabetes mellitus with hyperglycemia, without long-term current use of insulin
Type 2 diabetes mellitus with hyperglycemia, without long-term current use of insulin
Essential hypertension
Type 2 diabetes mellitus with hyperglycemia, without long-term current use of insulin

## 2021-03-19 NOTE — PROGRESS NOTE ADULT - PROBLEM SELECTOR PLAN 2
- Restart lantus 15u at bedtime.  - Monitor FSG and cover with SSI.  - C/w IVF for hydration.
- iss  endo
- Restart lantus 15u at bedtime.  - Monitor FSG and cover with SSI.  - C/w IVF for hydration.
- iss  endo
- Restart lantus 15u at bedtime.  - Monitor FSG and cover with SSI.  - C/w IVF for hydration.  - NPO for now.
- iss  endo

## 2021-03-19 NOTE — PROGRESS NOTE ADULT - PROVIDER SPECIALTY LIST ADULT
Endocrinology
Infectious Disease
Infectious Disease
Podiatry
Anesthesia
Cardiology
Infectious Disease
Infectious Disease
Cardiology
Infectious Disease
Podiatry
Cardiology
Cardiology
Infectious Disease
Podiatry
Cardiology
Internal Medicine

## 2021-03-19 NOTE — PROGRESS NOTE ADULT - PROBLEM SELECTOR PROBLEM 1
Diabetic foot ulcer
Type 2 diabetes mellitus with hyperglycemia, without long-term current use of insulin
Diabetic foot ulcer

## 2021-03-19 NOTE — PROGRESS NOTE ADULT - PROBLEM SELECTOR PROBLEM 4
Pre-op evaluation

## 2021-03-22 LAB — CULTURE RESULTS: SIGNIFICANT CHANGE UP

## 2021-03-23 LAB — CULTURE RESULTS: SIGNIFICANT CHANGE UP

## 2021-06-18 NOTE — ED CDU PROVIDER INITIAL DAY NOTE - ATTENDING CONTRIBUTION TO CARE
Attending MD Guerra:   I personally have seen and examined this patient.  Physician assistant note reviewed and agree on plan of care and except where noted. No lesions; no rash

## 2021-10-24 ENCOUNTER — INPATIENT (INPATIENT)
Facility: HOSPITAL | Age: 56
LOS: 9 days | Discharge: NURSING, REHAB OR LONG-TERM CARE | DRG: 623 | End: 2021-11-03
Attending: INTERNAL MEDICINE | Admitting: INTERNAL MEDICINE
Payer: MEDICAID

## 2021-10-24 VITALS
SYSTOLIC BLOOD PRESSURE: 159 MMHG | WEIGHT: 315 LBS | DIASTOLIC BLOOD PRESSURE: 74 MMHG | HEART RATE: 88 BPM | RESPIRATION RATE: 18 BRPM | OXYGEN SATURATION: 96 % | TEMPERATURE: 99 F

## 2021-10-24 PROCEDURE — 99285 EMERGENCY DEPT VISIT HI MDM: CPT

## 2021-10-25 DIAGNOSIS — E11.621 TYPE 2 DIABETES MELLITUS WITH FOOT ULCER: ICD-10-CM

## 2021-10-25 DIAGNOSIS — E66.9 OBESITY, UNSPECIFIED: ICD-10-CM

## 2021-10-25 DIAGNOSIS — N17.9 ACUTE KIDNEY FAILURE, UNSPECIFIED: ICD-10-CM

## 2021-10-25 DIAGNOSIS — I16.1 HYPERTENSIVE EMERGENCY: ICD-10-CM

## 2021-10-25 DIAGNOSIS — E11.9 TYPE 2 DIABETES MELLITUS WITHOUT COMPLICATIONS: ICD-10-CM

## 2021-10-25 DIAGNOSIS — D64.9 ANEMIA, UNSPECIFIED: ICD-10-CM

## 2021-10-25 DIAGNOSIS — Z29.9 ENCOUNTER FOR PROPHYLACTIC MEASURES, UNSPECIFIED: ICD-10-CM

## 2021-10-25 DIAGNOSIS — Z89.429 ACQUIRED ABSENCE OF OTHER TOE(S), UNSPECIFIED SIDE: Chronic | ICD-10-CM

## 2021-10-25 LAB
A1C WITH ESTIMATED AVERAGE GLUCOSE RESULT: 8.4 % — HIGH (ref 4–5.6)
ALBUMIN SERPL ELPH-MCNC: 1.9 G/DL — LOW (ref 3.5–5)
ALP SERPL-CCNC: 104 U/L — SIGNIFICANT CHANGE UP (ref 40–120)
ALT FLD-CCNC: 14 U/L DA — SIGNIFICANT CHANGE UP (ref 10–60)
ANION GAP SERPL CALC-SCNC: 4 MMOL/L — LOW (ref 5–17)
ANION GAP SERPL CALC-SCNC: 6 MMOL/L — SIGNIFICANT CHANGE UP (ref 5–17)
AST SERPL-CCNC: 8 U/L — LOW (ref 10–40)
BASOPHILS # BLD AUTO: 0.04 K/UL — SIGNIFICANT CHANGE UP (ref 0–0.2)
BASOPHILS # BLD AUTO: 0.05 K/UL — SIGNIFICANT CHANGE UP (ref 0–0.2)
BASOPHILS NFR BLD AUTO: 0.4 % — SIGNIFICANT CHANGE UP (ref 0–2)
BASOPHILS NFR BLD AUTO: 0.4 % — SIGNIFICANT CHANGE UP (ref 0–2)
BILIRUB SERPL-MCNC: 0.5 MG/DL — SIGNIFICANT CHANGE UP (ref 0.2–1.2)
BUN SERPL-MCNC: 37 MG/DL — HIGH (ref 7–18)
BUN SERPL-MCNC: 42 MG/DL — HIGH (ref 7–18)
CALCIUM SERPL-MCNC: 8.6 MG/DL — SIGNIFICANT CHANGE UP (ref 8.4–10.5)
CALCIUM SERPL-MCNC: 8.8 MG/DL — SIGNIFICANT CHANGE UP (ref 8.4–10.5)
CHLORIDE SERPL-SCNC: 105 MMOL/L — SIGNIFICANT CHANGE UP (ref 96–108)
CHLORIDE SERPL-SCNC: 107 MMOL/L — SIGNIFICANT CHANGE UP (ref 96–108)
CHOLEST SERPL-MCNC: 125 MG/DL — SIGNIFICANT CHANGE UP
CO2 SERPL-SCNC: 27 MMOL/L — SIGNIFICANT CHANGE UP (ref 22–31)
CO2 SERPL-SCNC: 28 MMOL/L — SIGNIFICANT CHANGE UP (ref 22–31)
CREAT SERPL-MCNC: 1.83 MG/DL — HIGH (ref 0.5–1.3)
CREAT SERPL-MCNC: 1.9 MG/DL — HIGH (ref 0.5–1.3)
EOSINOPHIL # BLD AUTO: 0.4 K/UL — SIGNIFICANT CHANGE UP (ref 0–0.5)
EOSINOPHIL # BLD AUTO: 0.4 K/UL — SIGNIFICANT CHANGE UP (ref 0–0.5)
EOSINOPHIL NFR BLD AUTO: 3.6 % — SIGNIFICANT CHANGE UP (ref 0–6)
EOSINOPHIL NFR BLD AUTO: 3.9 % — SIGNIFICANT CHANGE UP (ref 0–6)
ERYTHROCYTE [SEDIMENTATION RATE] IN BLOOD: 104 MM/HR — HIGH (ref 0–20)
ERYTHROCYTE [SEDIMENTATION RATE] IN BLOOD: 104 MM/HR — HIGH (ref 0–20)
ESTIMATED AVERAGE GLUCOSE: 194 MG/DL — HIGH (ref 68–114)
GLUCOSE BLDC GLUCOMTR-MCNC: 213 MG/DL — HIGH (ref 70–99)
GLUCOSE BLDC GLUCOMTR-MCNC: 240 MG/DL — HIGH (ref 70–99)
GLUCOSE BLDC GLUCOMTR-MCNC: 285 MG/DL — HIGH (ref 70–99)
GLUCOSE SERPL-MCNC: 234 MG/DL — HIGH (ref 70–99)
GLUCOSE SERPL-MCNC: 242 MG/DL — HIGH (ref 70–99)
HCT VFR BLD CALC: 24.7 % — LOW (ref 39–50)
HCT VFR BLD CALC: 26.3 % — LOW (ref 39–50)
HCV AB S/CO SERPL IA: 0.15 S/CO — SIGNIFICANT CHANGE UP (ref 0–0.99)
HCV AB SERPL-IMP: SIGNIFICANT CHANGE UP
HDLC SERPL-MCNC: 42 MG/DL — SIGNIFICANT CHANGE UP
HGB BLD-MCNC: 8.1 G/DL — LOW (ref 13–17)
HGB BLD-MCNC: 8.6 G/DL — LOW (ref 13–17)
IMM GRANULOCYTES NFR BLD AUTO: 0.4 % — SIGNIFICANT CHANGE UP (ref 0–1.5)
IMM GRANULOCYTES NFR BLD AUTO: 0.4 % — SIGNIFICANT CHANGE UP (ref 0–1.5)
LACTATE SERPL-SCNC: 0.6 MMOL/L — LOW (ref 0.7–2)
LIPID PNL WITH DIRECT LDL SERPL: 74 MG/DL — SIGNIFICANT CHANGE UP
LYMPHOCYTES # BLD AUTO: 1.58 K/UL — SIGNIFICANT CHANGE UP (ref 1–3.3)
LYMPHOCYTES # BLD AUTO: 1.78 K/UL — SIGNIFICANT CHANGE UP (ref 1–3.3)
LYMPHOCYTES # BLD AUTO: 14.1 % — SIGNIFICANT CHANGE UP (ref 13–44)
LYMPHOCYTES # BLD AUTO: 17.5 % — SIGNIFICANT CHANGE UP (ref 13–44)
MAGNESIUM SERPL-MCNC: 2.1 MG/DL — SIGNIFICANT CHANGE UP (ref 1.6–2.6)
MCHC RBC-ENTMCNC: 24.3 PG — LOW (ref 27–34)
MCHC RBC-ENTMCNC: 24.3 PG — LOW (ref 27–34)
MCHC RBC-ENTMCNC: 32.7 GM/DL — SIGNIFICANT CHANGE UP (ref 32–36)
MCHC RBC-ENTMCNC: 32.8 GM/DL — SIGNIFICANT CHANGE UP (ref 32–36)
MCV RBC AUTO: 74 FL — LOW (ref 80–100)
MCV RBC AUTO: 74.3 FL — LOW (ref 80–100)
MONOCYTES # BLD AUTO: 0.64 K/UL — SIGNIFICANT CHANGE UP (ref 0–0.9)
MONOCYTES # BLD AUTO: 0.87 K/UL — SIGNIFICANT CHANGE UP (ref 0–0.9)
MONOCYTES NFR BLD AUTO: 6.3 % — SIGNIFICANT CHANGE UP (ref 2–14)
MONOCYTES NFR BLD AUTO: 7.7 % — SIGNIFICANT CHANGE UP (ref 2–14)
NEUTROPHILS # BLD AUTO: 7.3 K/UL — SIGNIFICANT CHANGE UP (ref 1.8–7.4)
NEUTROPHILS # BLD AUTO: 8.29 K/UL — HIGH (ref 1.8–7.4)
NEUTROPHILS NFR BLD AUTO: 71.5 % — SIGNIFICANT CHANGE UP (ref 43–77)
NEUTROPHILS NFR BLD AUTO: 73.8 % — SIGNIFICANT CHANGE UP (ref 43–77)
NON HDL CHOLESTEROL: 83 MG/DL — SIGNIFICANT CHANGE UP
NRBC # BLD: 0 /100 WBCS — SIGNIFICANT CHANGE UP (ref 0–0)
NRBC # BLD: 0 /100 WBCS — SIGNIFICANT CHANGE UP (ref 0–0)
PHOSPHATE SERPL-MCNC: 3.2 MG/DL — SIGNIFICANT CHANGE UP (ref 2.5–4.5)
PLATELET # BLD AUTO: 333 K/UL — SIGNIFICANT CHANGE UP (ref 150–400)
PLATELET # BLD AUTO: 406 K/UL — HIGH (ref 150–400)
POTASSIUM SERPL-MCNC: 4.3 MMOL/L — SIGNIFICANT CHANGE UP (ref 3.5–5.3)
POTASSIUM SERPL-MCNC: 4.5 MMOL/L — SIGNIFICANT CHANGE UP (ref 3.5–5.3)
POTASSIUM SERPL-SCNC: 4.3 MMOL/L — SIGNIFICANT CHANGE UP (ref 3.5–5.3)
POTASSIUM SERPL-SCNC: 4.5 MMOL/L — SIGNIFICANT CHANGE UP (ref 3.5–5.3)
PROT SERPL-MCNC: 8.6 G/DL — HIGH (ref 6–8.3)
RBC # BLD: 3.34 M/UL — LOW (ref 4.2–5.8)
RBC # BLD: 3.54 M/UL — LOW (ref 4.2–5.8)
RBC # FLD: 15.7 % — HIGH (ref 10.3–14.5)
RBC # FLD: 15.9 % — HIGH (ref 10.3–14.5)
SARS-COV-2 RNA SPEC QL NAA+PROBE: SIGNIFICANT CHANGE UP
SODIUM SERPL-SCNC: 138 MMOL/L — SIGNIFICANT CHANGE UP (ref 135–145)
SODIUM SERPL-SCNC: 139 MMOL/L — SIGNIFICANT CHANGE UP (ref 135–145)
TRIGL SERPL-MCNC: 44 MG/DL — SIGNIFICANT CHANGE UP
TSH SERPL-MCNC: 1.93 UU/ML — SIGNIFICANT CHANGE UP (ref 0.34–4.82)
WBC # BLD: 10.2 K/UL — SIGNIFICANT CHANGE UP (ref 3.8–10.5)
WBC # BLD: 11.24 K/UL — HIGH (ref 3.8–10.5)
WBC # FLD AUTO: 10.2 K/UL — SIGNIFICANT CHANGE UP (ref 3.8–10.5)
WBC # FLD AUTO: 11.24 K/UL — HIGH (ref 3.8–10.5)

## 2021-10-25 PROCEDURE — 71045 X-RAY EXAM CHEST 1 VIEW: CPT | Mod: 26

## 2021-10-25 PROCEDURE — 12345: CPT | Mod: NC,GC

## 2021-10-25 PROCEDURE — 99222 1ST HOSP IP/OBS MODERATE 55: CPT

## 2021-10-25 PROCEDURE — 73630 X-RAY EXAM OF FOOT: CPT | Mod: 26,LT

## 2021-10-25 RX ORDER — INSULIN LISPRO 100/ML
VIAL (ML) SUBCUTANEOUS AT BEDTIME
Refills: 0 | Status: DISCONTINUED | OUTPATIENT
Start: 2021-10-25 | End: 2021-10-27

## 2021-10-25 RX ORDER — CARVEDILOL PHOSPHATE 80 MG/1
1 CAPSULE, EXTENDED RELEASE ORAL
Qty: 0 | Refills: 0 | DISCHARGE

## 2021-10-25 RX ORDER — FERROUS GLUCONATE 100 %
1 POWDER (GRAM) MISCELLANEOUS
Qty: 0 | Refills: 0 | DISCHARGE

## 2021-10-25 RX ORDER — CARVEDILOL PHOSPHATE 80 MG/1
25 CAPSULE, EXTENDED RELEASE ORAL EVERY 12 HOURS
Refills: 0 | Status: DISCONTINUED | OUTPATIENT
Start: 2021-10-25 | End: 2021-10-27

## 2021-10-25 RX ORDER — INSULIN LISPRO 100/ML
VIAL (ML) SUBCUTANEOUS
Refills: 0 | Status: DISCONTINUED | OUTPATIENT
Start: 2021-10-25 | End: 2021-10-27

## 2021-10-25 RX ORDER — SODIUM CHLORIDE 9 MG/ML
1000 INJECTION INTRAMUSCULAR; INTRAVENOUS; SUBCUTANEOUS
Refills: 0 | Status: DISCONTINUED | OUTPATIENT
Start: 2021-10-25 | End: 2021-10-29

## 2021-10-25 RX ORDER — INSULIN GLARGINE 100 [IU]/ML
10 INJECTION, SOLUTION SUBCUTANEOUS AT BEDTIME
Refills: 0 | Status: DISCONTINUED | OUTPATIENT
Start: 2021-10-25 | End: 2021-10-27

## 2021-10-25 RX ORDER — AMLODIPINE BESYLATE 2.5 MG/1
10 TABLET ORAL DAILY
Refills: 0 | Status: DISCONTINUED | OUTPATIENT
Start: 2021-10-26 | End: 2021-10-27

## 2021-10-25 RX ORDER — PIPERACILLIN AND TAZOBACTAM 4; .5 G/20ML; G/20ML
3.38 INJECTION, POWDER, LYOPHILIZED, FOR SOLUTION INTRAVENOUS ONCE
Refills: 0 | Status: COMPLETED | OUTPATIENT
Start: 2021-10-25 | End: 2021-10-25

## 2021-10-25 RX ORDER — SODIUM CHLORIDE 9 MG/ML
1000 INJECTION INTRAMUSCULAR; INTRAVENOUS; SUBCUTANEOUS
Refills: 0 | Status: DISCONTINUED | OUTPATIENT
Start: 2021-10-25 | End: 2021-10-25

## 2021-10-25 RX ORDER — INSULIN LISPRO 100/ML
4 VIAL (ML) SUBCUTANEOUS
Qty: 0 | Refills: 0 | DISCHARGE

## 2021-10-25 RX ORDER — PANTOPRAZOLE SODIUM 20 MG/1
40 TABLET, DELAYED RELEASE ORAL
Refills: 0 | Status: DISCONTINUED | OUTPATIENT
Start: 2021-10-25 | End: 2021-10-27

## 2021-10-25 RX ORDER — HEPARIN SODIUM 5000 [USP'U]/ML
5000 INJECTION INTRAVENOUS; SUBCUTANEOUS EVERY 8 HOURS
Refills: 0 | Status: DISCONTINUED | OUTPATIENT
Start: 2021-10-25 | End: 2021-10-27

## 2021-10-25 RX ORDER — INSULIN LISPRO 100/ML
3 VIAL (ML) SUBCUTANEOUS
Refills: 0 | Status: DISCONTINUED | OUTPATIENT
Start: 2021-10-25 | End: 2021-10-27

## 2021-10-25 RX ORDER — FUROSEMIDE 40 MG
20 TABLET ORAL DAILY
Refills: 0 | Status: DISCONTINUED | OUTPATIENT
Start: 2021-10-25 | End: 2021-10-27

## 2021-10-25 RX ORDER — AMLODIPINE BESYLATE 2.5 MG/1
1 TABLET ORAL
Qty: 0 | Refills: 0 | DISCHARGE

## 2021-10-25 RX ORDER — INSULIN DEGLUDEC 100 U/ML
18 INJECTION, SOLUTION SUBCUTANEOUS
Qty: 0 | Refills: 0 | DISCHARGE

## 2021-10-25 RX ORDER — CEFAZOLIN SODIUM 1 G
1000 VIAL (EA) INJECTION EVERY 12 HOURS
Refills: 0 | Status: DISCONTINUED | OUTPATIENT
Start: 2021-10-25 | End: 2021-10-27

## 2021-10-25 RX ORDER — VANCOMYCIN HCL 1 G
1000 VIAL (EA) INTRAVENOUS ONCE
Refills: 0 | Status: COMPLETED | OUTPATIENT
Start: 2021-10-25 | End: 2021-10-25

## 2021-10-25 RX ORDER — ASPIRIN/CALCIUM CARB/MAGNESIUM 324 MG
81 TABLET ORAL DAILY
Refills: 0 | Status: DISCONTINUED | OUTPATIENT
Start: 2021-10-25 | End: 2021-10-27

## 2021-10-25 RX ORDER — FUROSEMIDE 40 MG
1 TABLET ORAL
Qty: 0 | Refills: 0 | DISCHARGE

## 2021-10-25 RX ORDER — ASPIRIN/CALCIUM CARB/MAGNESIUM 324 MG
1 TABLET ORAL
Qty: 0 | Refills: 0 | DISCHARGE

## 2021-10-25 RX ORDER — CARVEDILOL PHOSPHATE 80 MG/1
25 CAPSULE, EXTENDED RELEASE ORAL EVERY 12 HOURS
Refills: 0 | Status: DISCONTINUED | OUTPATIENT
Start: 2021-10-25 | End: 2021-10-25

## 2021-10-25 RX ORDER — AMLODIPINE BESYLATE 2.5 MG/1
10 TABLET ORAL ONCE
Refills: 0 | Status: COMPLETED | OUTPATIENT
Start: 2021-10-25 | End: 2021-10-25

## 2021-10-25 RX ADMIN — HEPARIN SODIUM 5000 UNIT(S): 5000 INJECTION INTRAVENOUS; SUBCUTANEOUS at 22:06

## 2021-10-25 RX ADMIN — PIPERACILLIN AND TAZOBACTAM 200 GRAM(S): 4; .5 INJECTION, POWDER, LYOPHILIZED, FOR SOLUTION INTRAVENOUS at 03:49

## 2021-10-25 RX ADMIN — Medication 2: at 17:33

## 2021-10-25 RX ADMIN — Medication 3: at 13:38

## 2021-10-25 RX ADMIN — Medication 20 MILLIGRAM(S): at 11:55

## 2021-10-25 RX ADMIN — Medication 3 UNIT(S): at 13:39

## 2021-10-25 RX ADMIN — CARVEDILOL PHOSPHATE 25 MILLIGRAM(S): 80 CAPSULE, EXTENDED RELEASE ORAL at 11:55

## 2021-10-25 RX ADMIN — AMLODIPINE BESYLATE 10 MILLIGRAM(S): 2.5 TABLET ORAL at 10:47

## 2021-10-25 RX ADMIN — CARVEDILOL PHOSPHATE 25 MILLIGRAM(S): 80 CAPSULE, EXTENDED RELEASE ORAL at 17:42

## 2021-10-25 RX ADMIN — Medication 100 MILLIGRAM(S): at 17:42

## 2021-10-25 RX ADMIN — Medication 81 MILLIGRAM(S): at 11:55

## 2021-10-25 RX ADMIN — SODIUM CHLORIDE 100 MILLILITER(S): 9 INJECTION INTRAMUSCULAR; INTRAVENOUS; SUBCUTANEOUS at 17:44

## 2021-10-25 RX ADMIN — Medication 250 MILLIGRAM(S): at 03:49

## 2021-10-25 RX ADMIN — INSULIN GLARGINE 10 UNIT(S): 100 INJECTION, SOLUTION SUBCUTANEOUS at 22:06

## 2021-10-25 RX ADMIN — Medication 3 UNIT(S): at 17:33

## 2021-10-25 NOTE — ED PROVIDER NOTE - OBJECTIVE STATEMENT
56 year old male presents to the ED with complaints of chronic left heel ulcer for x3 months. Patient states it is progressively worsening. Pt states he was evaluated by pediatrist on Thursday (10/21/21) and to be admitted for antibiotics and pediatrist consult. Denies fever, chest pain, shortness of breath.   NKDA.

## 2021-10-25 NOTE — H&P ADULT - PROBLEM/PLAN-3
Health Maintenance Due   Topic Date Due   • Pneumococcal Vaccine 0-64 (1 of 1 - PPSV23) 11/11/1970   • DTaP/Tdap/Td Vaccine (1 - Tdap) 11/11/1983   • Colorectal Cancer Screening-Colonoscopy  11/11/2014   • Shingles Vaccine (1 of 2) 11/11/2014   • Influenza Vaccine (1) 09/01/2019       Patient is due for the above items and declines today.         DISPLAY PLAN FREE TEXT

## 2021-10-25 NOTE — H&P ADULT - PROBLEM SELECTOR PLAN 4
pt with hemoglobin of 8.1  unknown baseline  denies bleeding  f/u iron panel, ferritin  f/u fobt  pt on ferrous gluconate at home pt with hemoglobin of 8.1  unknown baseline  endorses Gi bleed around 8 months ago  f/u iron panel, ferritin  f/u fobt  pt on ferrous gluconate at home  Never had a colonoscopy or endoscopy

## 2021-10-25 NOTE — H&P ADULT - NSHPREVIEWOFSYSTEMS_GEN_ALL_CORE
CONSTITUTIONAL: No fever, weight loss, or fatigue  EYES: No eye pain, visual disturbances, or discharge  ENT:  No difficulty hearing, tinnitus, vertigo; No sinus or throat pain  NECK: No pain or stiffness  RESPIRATORY: No cough, wheezing, chills or hemoptysis; No Shortness of Breath  CARDIOVASCULAR: No chest pain, palpitations, passing out, dizziness, or leg swelling  GASTROINTESTINAL: No abdominal or epigastric pain. No nausea, vomiting, or hematemesis; No diarrhea or constipation. No melena or hematochezia.  GENITOURINARY: No dysuria, frequency, hematuria, or incontinence  NEUROLOGICAL: No headaches, memory loss, loss of strength, numbness, or tremors  SKIN: + chronic wound, poorly healing on left foot heel   LYMPH Nodes: No enlarged glands  ENDOCRINE: No heat or cold intolerance; No hair loss  MUSCULOSKELETAL: No joint pain or swelling; No muscle, back, No extremity pain  PSYCHIATRIC: No depression, anxiety, mood swings, or difficulty sleeping  HEME/LYMPH: No easy bruising, or bleeding gums  ALLERGY AND IMMUNOLOGIC: No hives or eczema

## 2021-10-25 NOTE — H&P ADULT - PROBLEM SELECTOR PLAN 2
Pt with bp of 196/78  denies taking any medications, but pharmacy states carvedilol 25bid and amlodipine 10  will give home meds  monitor bp  adjust as needed Pt with bp of 196/78  denies taking any medications, but pharmacy states carvedilol 25bid and amlodipine 10  will give home meds  monitor bp  adjust as needed  dash diet

## 2021-10-25 NOTE — CHART NOTE - NSCHARTNOTEFT_GEN_A_CORE
BRIEF NOTE:      Admission H&P from this morning reviewed. Patient seen and examined in the ED this afternoon. Patient is a 49 y/o M with PMH of Diabetes, HTN (not on meds), Obesity, and PSH of Left foot 5th digit amputation, who presented with worsening nonhealing chronic left heel ulcer for 2-3 months. S/p Vanc/Zosyn in the ED, ulcer does not appear significantly infected, and is not septic therefore will continue with Ancef for now. Seen by podiatry, s/p debridement and cultures sent, recommending MRI as probed to bone, ESR/CRP and ID consultation, will consult Dr. Paula in AM. Unclear if he has CKD vs CARRIE, will need to obtain records from his PCP's office. Continue IVF for now and monitor Cr and Hg. Obtain Iron panel. Patient states he's agreeable to [facility] placement for wound care.       Rest as per admission H&P. Patient should have been admitted under Dr. Rodriguez, discussed with Dr. Rodriguez who will assume care. BRIEF NOTE:      Admission H&P from this morning reviewed. Patient seen and examined in the ED this afternoon. Patient is a 51 y/o M with PMH of Diabetes, HTN (not on meds), Obesity, and PSH of Left foot 5th digit amputation, who presented with worsening nonhealing chronic left heel ulcer for 2-3 months. S/p Vanc/Zosyn in the ED, ulcer does not appear significantly infected, and is not septic therefore will continue with Ancef for now. Seen by podiatry, s/p debridement and cultures sent, recommending MRI as probed to bone, ESR/CRP and ID consultation, will consult Dr. Love in AM. Unclear if he has CKD vs CARRIE, will need to obtain records from his PCP's office. Continue IVF for now and monitor Cr and Hg. Obtain Iron panel. Patient states he's agreeable to [facility] placement for wound care.       Rest as per admission H&P. Patient should have been admitted under Dr. Rodriguez, discussed with Dr. Rodriguez who will assume care.

## 2021-10-25 NOTE — ED PROVIDER NOTE - CLINICAL SUMMARY MEDICAL DECISION MAKING FREE TEXT BOX
Pt admitted for IV abx and podiatry consult.  MAR and Dr. Garcia endorsed. Pt agrees with admission. I had a detailed discussion with the patient and/or guardian regarding the historical points, exam findings, and any diagnostic results supporting the admit diagnosis.

## 2021-10-25 NOTE — H&P ADULT - ATTENDING COMMENTS
Pt seen at bedside.  Case to discuss.  56 year old man with PMH of DM - uncontrolled here on account of acute worsening of his chronic left heel ulcer. There is increased pain and discharge. NO fevers, no nausea or vomiting.    Vital Signs Last 24 Hrs  T(C): 37.2 (24 Oct 2021 23:39), Max: 37.2 (24 Oct 2021 23:39)  T(F): 98.9 (24 Oct 2021 23:39), Max: 98.9 (24 Oct 2021 23:39)  HR: 88 (24 Oct 2021 23:39) (88 - 88)  BP: 159/74 (24 Oct 2021 23:39) (159/74 - 159/74)  RR: 18 (24 Oct 2021 23:39) (18 - 18)  SpO2: 96% (24 Oct 2021 23:39) (96% - 96%)    Labs                         8.1    11.24 )-----------( 333      ( 25 Oct 2021 02:59 )             24.7     10-25    138  |  107  |  42<H>  ----------------------------<  242<H>  4.5   |  27  |  1.90<H>    Ca    8.6      25 Oct 2021 02:59      CXR - diffuse interstitial  infiltrates    Left foot x ray = INDEPENDENT review  Surgical amputation of 5th distal metatarsal  Fuzziness/unclear demarcation between the cortex and medulla in the tip of the hallux  Suspected partial loss of the bony arch of the left foot  calcaneal hook    Impression   - Diabetic(left) foot ulcer   - Infected ulcer   - Uncontrolled Dm with hyperglycemia  - renal insufficiency -> acute vs chronic  - Microcytic anemia     Plan Pt seen at bedside.  Case to discuss.  56 year old man with PMH of DM - uncontrolled here on account of acute worsening of his chronic left heel ulcer. There is increased pain and discharge. NO fevers, no nausea or vomiting.    Vital Signs Last 24 Hrs  T(C): 37.2 (24 Oct 2021 23:39), Max: 37.2 (24 Oct 2021 23:39)  T(F): 98.9 (24 Oct 2021 23:39), Max: 98.9 (24 Oct 2021 23:39)  HR: 88 (24 Oct 2021 23:39) (88 - 88)  BP: 159/74 (24 Oct 2021 23:39) (159/74 - 159/74)  RR: 18 (24 Oct 2021 23:39) (18 - 18)  SpO2: 96% (24 Oct 2021 23:39) (96% - 96%)    Middle aged man, NAD AAO X 3  Right leg - chronic venous static changes   Pitting pedal edema 3+ to the knee  Heel - full thickness ulcer over the whole heel with exposed fat surface  Pink granulation tissue at base- no obvious infection   NO warmth, no redness, no tenderness        Labs                         8.1    11.24 )-----------( 333      ( 25 Oct 2021 02:59 )             24.7     10-25    138  |  107  |  42<H>  ----------------------------<  242<H>  4.5   |  27  |  1.90<H>    Ca    8.6      25 Oct 2021 02:59      CXR - diffuse interstitial  infiltrates    Left foot x ray = INDEPENDENT review  Surgical amputation of 5th distal metatarsal  Fuzziness/unclear demarcation between the cortex and medulla in the tip of the hallux  Suspected partial loss of the bony arch of the left foot  calcaneal hook    Impression   - Diabetic(left) foot ulcer   - Infected ulcer   - Uncontrolled Dm with hyperglycemia  - renal insufficiency -> acute vs chronic  - Microcytic anemia     Plan  Admit to Medicine   Podiatry consult  Hold off IV antibiotics for now   Counselling for compliance with DM medications  Check A1c; Lipid panel   Iron panel  work up for suspected CKD - to inculde urine lytes and renal U/S

## 2021-10-25 NOTE — CONSULT NOTE ADULT - SUBJECTIVE AND OBJECTIVE BOX
Podiatry HPI: 57 y/o diabetic male presents to the emergency department for a left heel wound. Patient states he was told to come to the ED by his podiatrist, Dr. Woodard. Patient states he has had the heel ulcer for 2 months. Patient states that the wound has progressively worsened in the last two months. Patient denies any pain to the wound. Patient states he has been following up with his podiatrist regularly for the past 8 months. Patient states he had a toe removed on his left foot several months ago. Patient admits he has diabetic neuropathy. Patient admits his recent blood sugar test was above 200. Patient denies smoking or drinking. Patient denies constitutional symptoms, denies trauma. No further pedal complaints.        Patient is a 56y old  Male who presents with a chief complaint of Diabetic foot ulcer (25 Oct 2021 04:29)      HPI:  Pt is a 49 y/o M with PMH of Diabetes (on insulin), Htn (says not on meds, but pharmacy states amlodipine, and carvedilol), anemia, obesity, PSH of Left foot 5th digit amputation, who presented to the ED with complaints of chronic left heel ulcer for 2-3 months. Patient states it has not been healing well. He has been seeing a podiatrist Dr. Ruffin, who sent him to the hospital for further evaluation. Pt says that he has sometimes seen some purple discharge from the wound, he denies any fevers or chills. He also denies any limited range of motion, difficulty with ambulation, pain at the site, or at the ankle joint. He denies any other complaints.     Upon further questioning after lab review. Patient endorses that his PCP told him to see a nephrologist for his kidney but he hadn't had the opportunity to go. He also knew about anemia and endorses some episodes of bleeding both painless BRBPR and black tarry stools around 8 months ago. Has never had a colonoscopy or endoscopy. Patient denies ever having a echocardiogram, or being told he has heart failure.  (25 Oct 2021 04:29)      Podiatry HPI:    PMH:Diabetes    No pertinent past medical history    Diabetes mellitus    Hypertension    Diabetic foot ulcer    Obesity      Allergies: No Known Drug Allergies  shellfish (Angioedema)  shellfish (Unknown)    Medications: aspirin  chewable 81 milliGRAM(s) Oral daily  carvedilol 25 milliGRAM(s) Oral every 12 hours  ceFAZolin   IVPB 1000 milliGRAM(s) IV Intermittent every 12 hours  furosemide    Tablet 20 milliGRAM(s) Oral daily  heparin   Injectable 5000 Unit(s) SubCutaneous every 8 hours  insulin glargine Injectable (LANTUS) 10 Unit(s) SubCutaneous at bedtime  insulin lispro (ADMELOG) corrective regimen sliding scale   SubCutaneous three times a day before meals  insulin lispro (ADMELOG) corrective regimen sliding scale   SubCutaneous at bedtime  insulin lispro Injectable (ADMELOG) 3 Unit(s) SubCutaneous three times a day before meals  pantoprazole    Tablet 40 milliGRAM(s) Oral before breakfast  sodium chloride 0.9%. 1000 milliLiter(s) IV Continuous <Continuous>    FH:No pertinent family history in first degree relatives    No pertinent family history in first degree relatives      PSX: No significant past surgical history    History of amputation of toe      SH: Social History:  Denies any tobacco, EOTH use, or recreational drug use (25 Oct 2021 04:29)      Vital Signs Last 24 Hrs  T(C): 36.9 (25 Oct 2021 11:42), Max: 37.2 (24 Oct 2021 23:39)  T(F): 98.5 (25 Oct 2021 11:42), Max: 98.9 (24 Oct 2021 23:39)  HR: 81 (25 Oct 2021 11:42) (81 - 88)  BP: 196/83 (25 Oct 2021 11:42) (159/74 - 196/83)  BP(mean): --  RR: 19 (25 Oct 2021 11:42) (18 - 19)  SpO2: 96% (25 Oct 2021 11:42) (96% - 99%)    LABS                        8.6    10.20 )-----------( 406      ( 25 Oct 2021 13:07 )             26.3               10-25    139  |  105  |  37<H>  ----------------------------<  234<H>  4.3   |  28  |  1.83<H>    Ca    8.8      25 Oct 2021 13:07  Phos  3.2     10-25  Mg     2.1     10-25    TPro  8.6<H>  /  Alb  1.9<L>  /  TBili  0.5  /  DBili  x   /  AST  8<L>  /  ALT  14  /  AlkPhos  104  10-25     WBC Count: 10.20 K/uL (10-25-21 @ 13:07)  WBC Count: 11.24 K/uL (10-25-21 @ 02:59)      ROS  REVIEW OF SYSTEMS:    CONSTITUTIONAL: No fever, weight loss, or fatigue  EYES: No eye pain, visual disturbances, or discharge  ENMT:  No difficulty hearing, tinnitus, vertigo; No sinus or throat pain  NECK: No pain or stiffness  BREASTS: No pain, masses, or nipple discharge  RESPIRATORY: No cough, wheezing, chills or hemoptysis; No shortness of breath  CARDIOVASCULAR: No chest pain, palpitations, dizziness, or leg swelling  GASTROINTESTINAL: No abdominal or epigastric pain. No nausea, vomiting, or hematemesis; No diarrhea or constipation. No melena or hematochezia.  GENITOURINARY: No dysuria, frequency, hematuria, or incontinence  NEUROLOGICAL: No headaches, memory loss, loss of strength, numbness, or tremors  SKIN: No itching, burning, rashes, or lesions   LYMPH NODES: No enlarged glands  ENDOCRINE: No heat or cold intolerance; No hair loss  MUSCULOSKELETAL: No joint pain or swelling; No muscle, back, or extremity pain  PSYCHIATRIC: No depression, anxiety, mood swings, or difficulty sleeping  HEME/LYMPH: No easy bruising, or bleeding gums  ALLERY AND IMMUNOLOGIC: No hives or eczema      PHYSICAL EXAM  GEN: MALU COURTNEY is a pleasant well-nourished, well developed 56y Male in no acute distress, alert awake, and oriented to person, place and time.   LE Focused:  Patient presents with a surgical shoe on his left foot and normal shoegear on the right. Patient is unassisted and unaccompanied. Patient is AOx3 and NAD.    Vasc:  DP pulses non-palpable b/l. PT pulses non-palpable b/l. Dopler exam revealed biphasic DP and PT pulses b/l. Skin temperature was warm to warm to the LLE and warm to cool to the RLE. Generalized non-pitting edema appreciated to the LLE. CFT <3 seconds to the b/l distal hallux.    Derm: Left infracalcaneal wound noted. Measured 7.0 cm x 6.0 cm x 1.0 cm. Malodor appreciated. No drainage. No tunneling. Fibrogranular wound base with normal periwound. Negative PTB. No streaking or erythema noted to LLE. Cicatrix noted to left 5th ray. Fissure noted to left distal hallux and posterior heels b/l.    Neuro: Light touch sensation absent b/l. Protective sensation grossly diminished b/l.    MSK: Muscle strength 5/5 for all pedal muscle groups b/l. No significant symptomatic limitations upon pedal joint ROM exams b/l. Patient had excessive amount of ankle joint dorsiflexion b/l. Left 5th ray amputation noted.    Imaging: ?xray pending     Cultures: pending    A: Left diabetic infracalcaneal ulcer  Diabetes Type 2 with peripheral neuropathy    P:   Patient evaluated, chart reviewed  Xrays pending  ESR and CRP ordered  Ulcer evaluated to LLE.   Excisional debridement of Left heel ulcer using sterile #15 blade down to and including subcutaneous tissue removing all nonviable soft tissue.   PT tolerated procedure well   Culture Left heel wound pending  Dressing applied Santyl, 4x4s, ABD pad, sherrie, and ace bandage.   Discussed possible OR wound debridement with graft application  Surgical shoe applied to LLE.  Patient to be NWB to LLE.  Patient to keep dressing clean, dry, and intact to LLE.  Recommend ID consult.  Discussed with Attending Dr. Woodard   Patient is a 56y old  Male who presents with a chief complaint of Diabetic foot ulcer (25 Oct 2021 04:29)    Podiatry HPI: 55 y/o diabetic male presents to the emergency department for a left heel wound. Patient states he was told to come to the ED by his podiatrist, Dr. Woodard. Patient states he has had the heel ulcer for 2 months. Patient states that the wound has progressively worsened in the last two months. Patient denies any pain to the wound. Patient states he has been following up with his podiatrist regularly for the past 8 months. Patient states he had a toe removed on his left foot several months ago. Patient admits he has diabetic neuropathy. Patient admits his recent blood sugar test was above 200. Patient denies smoking or drinking. Patient denies constitutional symptoms, denies trauma. No further pedal complaints.    HPI: Pt is a 49 y/o M with PMH of Diabetes (on insulin), Htn (says not on meds, but pharmacy states amlodipine, and carvedilol), anemia, obesity, PSH of Left foot 5th digit amputation, who presented to the ED with complaints of chronic left heel ulcer for 2-3 months. Patient states it has not been healing well. He has been seeing a podiatrist Dr. Ruffin, who sent him to the hospital for further evaluation. Pt says that he has sometimes seen some purple discharge from the wound, he denies any fevers or chills. He also denies any limited range of motion, difficulty with ambulation, pain at the site, or at the ankle joint. He denies any other complaints.     Upon further questioning after lab review. Patient endorses that his PCP told him to see a nephrologist for his kidney but he hadn't had the opportunity to go. He also knew about anemia and endorses some episodes of bleeding both painless BRBPR and black tarry stools around 8 months ago. Has never had a colonoscopy or endoscopy. Patient denies ever having a echocardiogram, or being told he has heart failure.  (25 Oct 2021 04:29)      PMH:Diabetes    No pertinent past medical history    Diabetes mellitus    Hypertension    Diabetic foot ulcer    Obesity      Allergies: No Known Drug Allergies  shellfish (Angioedema)  shellfish (Unknown)    Medications: aspirin  chewable 81 milliGRAM(s) Oral daily  carvedilol 25 milliGRAM(s) Oral every 12 hours  ceFAZolin   IVPB 1000 milliGRAM(s) IV Intermittent every 12 hours  furosemide    Tablet 20 milliGRAM(s) Oral daily  heparin   Injectable 5000 Unit(s) SubCutaneous every 8 hours  insulin glargine Injectable (LANTUS) 10 Unit(s) SubCutaneous at bedtime  insulin lispro (ADMELOG) corrective regimen sliding scale   SubCutaneous three times a day before meals  insulin lispro (ADMELOG) corrective regimen sliding scale   SubCutaneous at bedtime  insulin lispro Injectable (ADMELOG) 3 Unit(s) SubCutaneous three times a day before meals  pantoprazole    Tablet 40 milliGRAM(s) Oral before breakfast  sodium chloride 0.9%. 1000 milliLiter(s) IV Continuous <Continuous>    FH:No pertinent family history in first degree relatives    No pertinent family history in first degree relatives      PSX: No significant past surgical history    History of amputation of toe      SH: Social History:  Denies any tobacco, EOTH use, or recreational drug use (25 Oct 2021 04:29)      Vital Signs Last 24 Hrs  T(C): 36.9 (25 Oct 2021 11:42), Max: 37.2 (24 Oct 2021 23:39)  T(F): 98.5 (25 Oct 2021 11:42), Max: 98.9 (24 Oct 2021 23:39)  HR: 81 (25 Oct 2021 11:42) (81 - 88)  BP: 196/83 (25 Oct 2021 11:42) (159/74 - 196/83)  BP(mean): --  RR: 19 (25 Oct 2021 11:42) (18 - 19)  SpO2: 96% (25 Oct 2021 11:42) (96% - 99%)    LABS                        8.6    10.20 )-----------( 406      ( 25 Oct 2021 13:07 )             26.3               10-25    139  |  105  |  37<H>  ----------------------------<  234<H>  4.3   |  28  |  1.83<H>    Ca    8.8      25 Oct 2021 13:07  Phos  3.2     10-25  Mg     2.1     10-25    TPro  8.6<H>  /  Alb  1.9<L>  /  TBili  0.5  /  DBili  x   /  AST  8<L>  /  ALT  14  /  AlkPhos  104  10-25     WBC Count: 10.20 K/uL (10-25-21 @ 13:07)  WBC Count: 11.24 K/uL (10-25-21 @ 02:59)      ROS  REVIEW OF SYSTEMS: Negative unless otherwise stated in the HPI         PHYSICAL EXAM  GEN: MALU COURTNEY is a pleasant well-nourished, well developed 56y Male in no acute distress, alert awake, and oriented to person, place and time.   LE Focused:  Patient presents with a surgical shoe on his left foot and normal shoegear on the right. Patient is unassisted and unaccompanied. Patient is AOx3 and NAD.    Vasc:  DP pulses non-palpable b/l. PT pulses non-palpable b/l. Dopler exam revealed biphasic DP and PT pulses b/l. Skin temperature was warm to warm to the LLE and warm to cool to the RLE. Generalized non-pitting edema appreciated to the LLE. CFT <3 seconds to the b/l distal hallux.    Derm: Left infracalcaneal wound noted. Measured 7.0 cm x 6.0 cm x 1.0 cm. Malodor appreciated. No drainage. No tunneling. Fibrogranular wound base with normal periwound. Positive PTB. Surrounding tissue felt warm to touch. No streaking or erythema noted to LLE. Cicatrix noted to left 5th ray. Fissure noted to left distal hallux and posterior heels b/l.    Neuro: Light touch sensation absent b/l. Protective sensation grossly diminished b/l.    MSK: Muscle strength 5/5 for all pedal muscle groups b/l. No significant symptomatic limitations upon pedal joint ROM exams b/l. Patient had excessive amount of ankle joint dorsiflexion b/l. Left 5th ray amputation noted.      Imaging:  Xray pending read     Cultures: pending

## 2021-10-25 NOTE — H&P ADULT - PROBLEM SELECTOR PLAN 1
Pt with poorly healing ulcer on left foot heel   follows up outpatinet with podiatry Dr. Young?  WBC slightly elevated (11.24)  S/p Vanc and zosyn in ED  Continue abx  Strict blood glucose control   Podiatry consult Pt with poorly healing ulcer on left foot heel   follows up outpatinet with podiatry Dr. Young?  WBC slightly elevated (11.24)  S/p Vanc and zosyn in ED  Continue abx Ancef  Strict blood glucose control   Podiatry consult  Podiatry concerned for bone involvement  f/u MRI  ID to be consulted in am by primary team: Dr. Paula Pt with poorly healing ulcer on left foot heel   follows up outpatinet with podiatry Dr. Young?  WBC slightly elevated (11.24)  S/p Vanc and zosyn in ED  Continue abx Ancef  Strict blood glucose control   Podiatry consult  Podiatry concerned for bone involvement  f/u MRI  ID to be consulted in am by primary team: Dr. Love

## 2021-10-25 NOTE — H&P ADULT - NSHPPHYSICALEXAM_GEN_ALL_CORE
GENERAL: NAD, obese  HEAD:  Atraumatic, Normocephalic  EYES: EOMI, PERRLA, conjunctiva and sclera clear  ENMT: No tonsillar erythema, exudates, or enlargement; Moist mucous membranes, Good dentition, No lesions  NECK: Supple, normal appearance, No JVD; Normal thyroid; Trachea midline  NERVOUS SYSTEM:  Alert & Oriented X3,  Motor Strength 5/5 B/L upper and lower extremities, sensation intact  CHEST/LUNG: Lungs clear to auscultation bilaterally, No rales, rhonchi, wheezing   HEART: Regular rate and rhythm; No murmurs, rubs, or gallops  ABDOMEN: Soft, Nontender, Nondistended; Bowel sounds present  EXTREMITIES: There is wound on the heel of left foot, no discharge, no erythema, looks like it has been healing. + edema on lower extremity bilaterally. 2+ Peripheral Pulses on hands, difficult to assess on lower extremities due to skin thickening and edema., No clubbing, cyanosis  LYMPH: No lymphadenopathy noted  SKIN: chronic venous stasis changes on lower extremities bl

## 2021-10-25 NOTE — H&P ADULT - PROBLEM SELECTOR PLAN 5
pmh of diabetes  continue insulin   monitor bs  adjust insulin as needed pmh of diabetes on Tresiba 18 and humalog 4  continue insulin, admelog 3 units, lantus 10u, Sliding scale  monitor bs  adjust insulin as needed  f/u [id number]  carb consistent diet

## 2021-10-26 ENCOUNTER — TRANSCRIPTION ENCOUNTER (OUTPATIENT)
Age: 56
End: 2021-10-26

## 2021-10-26 DIAGNOSIS — Z02.9 ENCOUNTER FOR ADMINISTRATIVE EXAMINATIONS, UNSPECIFIED: ICD-10-CM

## 2021-10-26 LAB
A1C WITH ESTIMATED AVERAGE GLUCOSE RESULT: 8.5 % — HIGH (ref 4–5.6)
ANION GAP SERPL CALC-SCNC: 4 MMOL/L — LOW (ref 5–17)
BASOPHILS # BLD AUTO: 0.04 K/UL — SIGNIFICANT CHANGE UP (ref 0–0.2)
BASOPHILS NFR BLD AUTO: 0.5 % — SIGNIFICANT CHANGE UP (ref 0–2)
BUN SERPL-MCNC: 41 MG/DL — HIGH (ref 7–18)
CALCIUM SERPL-MCNC: 8.4 MG/DL — SIGNIFICANT CHANGE UP (ref 8.4–10.5)
CHLORIDE SERPL-SCNC: 109 MMOL/L — HIGH (ref 96–108)
CHLORIDE UR-SCNC: 69 MMOL/L — SIGNIFICANT CHANGE UP
CHOLEST SERPL-MCNC: 111 MG/DL — SIGNIFICANT CHANGE UP
CO2 SERPL-SCNC: 27 MMOL/L — SIGNIFICANT CHANGE UP (ref 22–31)
COVID-19 SPIKE DOMAIN AB INTERP: NEGATIVE — SIGNIFICANT CHANGE UP
COVID-19 SPIKE DOMAIN ANTIBODY RESULT: 0.4 U/ML — SIGNIFICANT CHANGE UP
CREAT ?TM UR-MCNC: 57 MG/DL — SIGNIFICANT CHANGE UP
CREAT SERPL-MCNC: 1.85 MG/DL — HIGH (ref 0.5–1.3)
CRP SERPL-MCNC: 66 MG/L — HIGH
CRP SERPL-MCNC: 75 MG/L — HIGH
EOSINOPHIL # BLD AUTO: 0.38 K/UL — SIGNIFICANT CHANGE UP (ref 0–0.5)
EOSINOPHIL NFR BLD AUTO: 4.7 % — SIGNIFICANT CHANGE UP (ref 0–6)
ESTIMATED AVERAGE GLUCOSE: 197 MG/DL — HIGH (ref 68–114)
FERRITIN SERPL-MCNC: 417 NG/ML — HIGH (ref 30–400)
GLUCOSE BLDC GLUCOMTR-MCNC: 135 MG/DL — HIGH (ref 70–99)
GLUCOSE BLDC GLUCOMTR-MCNC: 169 MG/DL — HIGH (ref 70–99)
GLUCOSE BLDC GLUCOMTR-MCNC: 195 MG/DL — HIGH (ref 70–99)
GLUCOSE BLDC GLUCOMTR-MCNC: 205 MG/DL — HIGH (ref 70–99)
GLUCOSE SERPL-MCNC: 196 MG/DL — HIGH (ref 70–99)
HCT VFR BLD CALC: 21.3 % — LOW (ref 39–50)
HCT VFR BLD CALC: 22.5 % — LOW (ref 39–50)
HDLC SERPL-MCNC: 35 MG/DL — LOW
HGB BLD-MCNC: 7.1 G/DL — LOW (ref 13–17)
HGB BLD-MCNC: 7.4 G/DL — LOW (ref 13–17)
IMM GRANULOCYTES NFR BLD AUTO: 0.4 % — SIGNIFICANT CHANGE UP (ref 0–1.5)
IRON SATN MFR SERPL: 21 % — SIGNIFICANT CHANGE UP (ref 20–55)
IRON SATN MFR SERPL: 28 UG/DL — LOW (ref 65–170)
LIPID PNL WITH DIRECT LDL SERPL: 68 MG/DL — SIGNIFICANT CHANGE UP
LYMPHOCYTES # BLD AUTO: 1.75 K/UL — SIGNIFICANT CHANGE UP (ref 1–3.3)
LYMPHOCYTES # BLD AUTO: 21.7 % — SIGNIFICANT CHANGE UP (ref 13–44)
MAGNESIUM SERPL-MCNC: 2.2 MG/DL — SIGNIFICANT CHANGE UP (ref 1.6–2.6)
MCHC RBC-ENTMCNC: 24.4 PG — LOW (ref 27–34)
MCHC RBC-ENTMCNC: 24.7 PG — LOW (ref 27–34)
MCHC RBC-ENTMCNC: 32.9 GM/DL — SIGNIFICANT CHANGE UP (ref 32–36)
MCHC RBC-ENTMCNC: 33.3 GM/DL — SIGNIFICANT CHANGE UP (ref 32–36)
MCV RBC AUTO: 74.2 FL — LOW (ref 80–100)
MCV RBC AUTO: 74.3 FL — LOW (ref 80–100)
MONOCYTES # BLD AUTO: 0.62 K/UL — SIGNIFICANT CHANGE UP (ref 0–0.9)
MONOCYTES NFR BLD AUTO: 7.7 % — SIGNIFICANT CHANGE UP (ref 2–14)
NEUTROPHILS # BLD AUTO: 5.26 K/UL — SIGNIFICANT CHANGE UP (ref 1.8–7.4)
NEUTROPHILS NFR BLD AUTO: 65 % — SIGNIFICANT CHANGE UP (ref 43–77)
NON HDL CHOLESTEROL: 76 MG/DL — SIGNIFICANT CHANGE UP
NRBC # BLD: 0 /100 WBCS — SIGNIFICANT CHANGE UP (ref 0–0)
NRBC # BLD: 0 /100 WBCS — SIGNIFICANT CHANGE UP (ref 0–0)
OB PNL STL: NEGATIVE — SIGNIFICANT CHANGE UP
PHOSPHATE SERPL-MCNC: 3.6 MG/DL — SIGNIFICANT CHANGE UP (ref 2.5–4.5)
PLATELET # BLD AUTO: 339 K/UL — SIGNIFICANT CHANGE UP (ref 150–400)
PLATELET # BLD AUTO: 362 K/UL — SIGNIFICANT CHANGE UP (ref 150–400)
POTASSIUM SERPL-MCNC: 4.8 MMOL/L — SIGNIFICANT CHANGE UP (ref 3.5–5.3)
POTASSIUM SERPL-SCNC: 4.8 MMOL/L — SIGNIFICANT CHANGE UP (ref 3.5–5.3)
PROT ?TM UR-MCNC: 73 MG/DL — HIGH (ref 0–12)
RBC # BLD: 2.87 M/UL — LOW (ref 4.2–5.8)
RBC # BLD: 3.03 M/UL — LOW (ref 4.2–5.8)
RBC # FLD: 15.9 % — HIGH (ref 10.3–14.5)
RBC # FLD: 16.2 % — HIGH (ref 10.3–14.5)
SARS-COV-2 IGG+IGM SERPL QL IA: 0.4 U/ML — SIGNIFICANT CHANGE UP
SARS-COV-2 IGG+IGM SERPL QL IA: NEGATIVE — SIGNIFICANT CHANGE UP
SARS-COV-2 RNA SPEC QL NAA+PROBE: SIGNIFICANT CHANGE UP
SODIUM SERPL-SCNC: 140 MMOL/L — SIGNIFICANT CHANGE UP (ref 135–145)
SODIUM UR-SCNC: 61 MMOL/L — SIGNIFICANT CHANGE UP
TIBC SERPL-MCNC: 131 UG/DL — LOW (ref 250–450)
TRIGL SERPL-MCNC: 40 MG/DL — SIGNIFICANT CHANGE UP
TSH SERPL-MCNC: 1.57 UU/ML — SIGNIFICANT CHANGE UP (ref 0.34–4.82)
UIBC SERPL-MCNC: 103 UG/DL — LOW (ref 110–370)
WBC # BLD: 8.08 K/UL — SIGNIFICANT CHANGE UP (ref 3.8–10.5)
WBC # BLD: 8.73 K/UL — SIGNIFICANT CHANGE UP (ref 3.8–10.5)
WBC # FLD AUTO: 8.08 K/UL — SIGNIFICANT CHANGE UP (ref 3.8–10.5)
WBC # FLD AUTO: 8.73 K/UL — SIGNIFICANT CHANGE UP (ref 3.8–10.5)

## 2021-10-26 PROCEDURE — 73718 MRI LOWER EXTREMITY W/O DYE: CPT | Mod: 26,LT

## 2021-10-26 RX ORDER — FERROUS SULFATE 325(65) MG
325 TABLET ORAL DAILY
Refills: 0 | Status: DISCONTINUED | OUTPATIENT
Start: 2021-10-26 | End: 2021-10-27

## 2021-10-26 RX ORDER — SENNA PLUS 8.6 MG/1
2 TABLET ORAL AT BEDTIME
Refills: 0 | Status: DISCONTINUED | OUTPATIENT
Start: 2021-10-26 | End: 2021-10-27

## 2021-10-26 RX ADMIN — Medication 0: at 17:14

## 2021-10-26 RX ADMIN — PANTOPRAZOLE SODIUM 40 MILLIGRAM(S): 20 TABLET, DELAYED RELEASE ORAL at 05:39

## 2021-10-26 RX ADMIN — Medication 3 UNIT(S): at 08:16

## 2021-10-26 RX ADMIN — Medication 100 MILLIGRAM(S): at 17:14

## 2021-10-26 RX ADMIN — INSULIN GLARGINE 10 UNIT(S): 100 INJECTION, SOLUTION SUBCUTANEOUS at 21:20

## 2021-10-26 RX ADMIN — Medication 20 MILLIGRAM(S): at 05:38

## 2021-10-26 RX ADMIN — AMLODIPINE BESYLATE 10 MILLIGRAM(S): 2.5 TABLET ORAL at 05:38

## 2021-10-26 RX ADMIN — SENNA PLUS 2 TABLET(S): 8.6 TABLET ORAL at 21:20

## 2021-10-26 RX ADMIN — Medication 325 MILLIGRAM(S): at 17:13

## 2021-10-26 RX ADMIN — CARVEDILOL PHOSPHATE 25 MILLIGRAM(S): 80 CAPSULE, EXTENDED RELEASE ORAL at 17:14

## 2021-10-26 RX ADMIN — Medication 3 UNIT(S): at 17:13

## 2021-10-26 RX ADMIN — HEPARIN SODIUM 5000 UNIT(S): 5000 INJECTION INTRAVENOUS; SUBCUTANEOUS at 13:45

## 2021-10-26 RX ADMIN — Medication 1: at 12:27

## 2021-10-26 RX ADMIN — CARVEDILOL PHOSPHATE 25 MILLIGRAM(S): 80 CAPSULE, EXTENDED RELEASE ORAL at 05:38

## 2021-10-26 RX ADMIN — HEPARIN SODIUM 5000 UNIT(S): 5000 INJECTION INTRAVENOUS; SUBCUTANEOUS at 05:38

## 2021-10-26 RX ADMIN — HEPARIN SODIUM 5000 UNIT(S): 5000 INJECTION INTRAVENOUS; SUBCUTANEOUS at 21:21

## 2021-10-26 RX ADMIN — Medication 81 MILLIGRAM(S): at 12:27

## 2021-10-26 RX ADMIN — Medication 2: at 08:16

## 2021-10-26 RX ADMIN — Medication 3 UNIT(S): at 12:26

## 2021-10-26 RX ADMIN — Medication 100 MILLIGRAM(S): at 05:38

## 2021-10-26 NOTE — PROGRESS NOTE ADULT - PROBLEM SELECTOR PLAN 4
Likely SCAR  Total Iron 24  Hgb 7.1 (8.1 on admit)  FOBT neg  No overt s/s bleeding  VSS  Start Ferrous sulfate and stool softner  Follow up repeat CBC this PM  Transfuse Hgb < 7  Follow up CBC in AM

## 2021-10-26 NOTE — PROGRESS NOTE ADULT - ASSESSMENT
Pt is a 49 y/o M with PMH of Diabetes, Htn (not on meds), obesity, PSH of Left foot 5th digit amputation, who presented to the ED with complaints of chronic left heel ulcer for 2-3 months.

## 2021-10-26 NOTE — PROGRESS NOTE ADULT - SUBJECTIVE AND OBJECTIVE BOX
HPI:  56 YOM admitted with L DFU.  Debrided at bedside, culture collected, ID consulted, MRI ordered and ABX started.    OVERNIGHT EVENTS:  No new overnight events.  Seen and examined at bedside.     REVIEW OF SYSTEMS:      CONSTITUTIONAL: No fever  EYES: no acute visual disturbances  NECK: No pain or stiffness  RESPIRATORY: No cough; No shortness of breath  CARDIOVASCULAR: No chest pain, no palpitations  GASTROINTESTINAL: No pain. No nausea, vomiting or diarrhea   NEUROLOGICAL: No headache or numbness, no tremors  MUSCULOSKELETAL: No joint pain, no muscle pain  GENITOURINARY: no dysuria, no frequency, no hesitancy  PSYCHIATRY: no depression, no anxiety  ALL OTHER  ROS negative        Vital Signs Last 24 Hrs  T(C): 37 (26 Oct 2021 05:05), Max: 37 (26 Oct 2021 05:05)  T(F): 98.6 (26 Oct 2021 05:05), Max: 98.6 (26 Oct 2021 05:05)  HR: 62 (26 Oct 2021 05:05) (62 - 72)  BP: 117/49 (26 Oct 2021 05:05) (117/49 - 157/77)  BP(mean): 99 (25 Oct 2021 19:15) (99 - 99)  RR: 18 (26 Oct 2021 05:05) (16 - 18)  SpO2: 99% (26 Oct 2021 05:05) (95% - 99%)    ________________________________________________  PHYSICAL EXAM:    GENERAL: NAD  HEENT: Normocephalic; conjunctivae and sclerae clear;  NECK : supple, no JVD  CHEST/LUNG: Clear to auscultation; Nonlabored  HEART: S1 S2  regular  ABDOMEN: Soft, Nontender, Nondistended; Bowel sounds present  EXTREMITIES: no cyanosis; no LE edema; no calf tenderness  SKIN: warm and dry; No rashes or lesions  NERVOUS SYSTEM:  Alert; no new deficits    _________________________________________________  CURRENT MEDICATIONS:    MEDICATIONS  (STANDING):  amLODIPine   Tablet 10 milliGRAM(s) Oral daily  aspirin  chewable 81 milliGRAM(s) Oral daily  carvedilol 25 milliGRAM(s) Oral every 12 hours  ceFAZolin   IVPB 1000 milliGRAM(s) IV Intermittent every 12 hours  furosemide    Tablet 20 milliGRAM(s) Oral daily  heparin   Injectable 5000 Unit(s) SubCutaneous every 8 hours  insulin glargine Injectable (LANTUS) 10 Unit(s) SubCutaneous at bedtime  insulin lispro (ADMELOG) corrective regimen sliding scale   SubCutaneous three times a day before meals  insulin lispro (ADMELOG) corrective regimen sliding scale   SubCutaneous at bedtime  insulin lispro Injectable (ADMELOG) 3 Unit(s) SubCutaneous three times a day before meals  pantoprazole    Tablet 40 milliGRAM(s) Oral before breakfast  sodium chloride 0.9%. 1000 milliLiter(s) (100 mL/Hr) IV Continuous <Continuous>    MEDICATIONS  (PRN):      __________________________________________________  LABS:                          7.1    8.08  )-----------( 339      ( 26 Oct 2021 09:06 )             21.3     10-26    140  |  109<H>  |  41<H>  ----------------------------<  196<H>  4.8   |  27  |  1.85<H>    Ca    8.4      26 Oct 2021 09:06  Phos  3.6     10-26  Mg     2.2     10-26    TPro  8.6<H>  /  Alb  1.9<L>  /  TBili  0.5  /  DBili  x   /  AST  8<L>  /  ALT  14  /  AlkPhos  104  10-25        CAPILLARY BLOOD GLUCOSE      POCT Blood Glucose.: 169 mg/dL (26 Oct 2021 12:05)  POCT Blood Glucose.: 205 mg/dL (26 Oct 2021 07:55)  POCT Blood Glucose.: 213 mg/dL (25 Oct 2021 21:19)  POCT Blood Glucose.: 240 mg/dL (25 Oct 2021 17:31)      __________________________________________________  RADIOLOGY & ADDITIONAL TESTS:    Imaging Personally Reviewed:  YES    < from: MR Foot No Cont, Left (10.26.21 @ 11:09) >  IMPRESSION: Cutaneous wound along the plantar aspect of the calcaneus with adjacent confluent subcutaneous soft tissue infiltration that may be secondary to phlegmon/infection and/or scarring. Correlate clinically.  Marrow edema signal throughout the adjacent lateral aspect of the calcaneus may be secondary to stress reaction and/or osteomyelitis.  Edema in the talus is favored to be secondary to stress reaction.  Severe insertional Achilles tendinosis.  Marked thickening and poor definition of the central cord of plantar fascia with adjacent bony productive changes.    < end of copied text >    Consultant(s) Notes Reviewed:   YES     Plan of care was discussed with patient and /or primary care giver; all questions and concerns were addressed and care was aligned with patient's wishes.    Plan discussed with attending and consulting physicians.

## 2021-10-26 NOTE — PROGRESS NOTE ADULT - PROBLEM SELECTOR PLAN 1
MRI noted above  S/p bedside debridement  Pt to go to OR 10/27 for further debridement, will need medical clearance  Follow up ECHO  Continue Ansef (day 2)  Blood cultures NGTD  Surgical culture pending  PT consult on hold until postop  Podiatry following  Dr. Love, ID, following

## 2021-10-26 NOTE — PROGRESS NOTE ADULT - PROBLEM SELECTOR PLAN 5
Uncontrolled in past 24H  Pt takes Tresiba and Humalog at home  Continue Lantus coverage QHS  Continue sliding scale insulin coverage  Continue glucose monitoring  Continue low carb diet

## 2021-10-26 NOTE — PROGRESS NOTE ADULT - SUBJECTIVE AND OBJECTIVE BOX
Patient is a 56y old  Male who presents with a chief complaint of Diabetic foot ulcer (25 Oct 2021 04:29)     Podiatry HPI: 57 y/o diabetic male presents to the emergency department for a left heel wound. Patient states he was told to come to the ED by his podiatrist, Dr. Woodard. Patient states he has had the heel ulcer for 2 months. Patient states that the wound has progressively worsened in the last two months. Patient denies any pain to the wound. Patient states he has been following up with his podiatrist regularly for the past 8 months. Patient states he had a toe removed on his left foot several months ago. Patient admits he has diabetic neuropathy. Patient admits his recent blood sugar test was above 200. Patient denies smoking or drinking. Patient denies constitutional symptoms, denies trauma. No further pedal complaints.    HPI: Pt is a 49 y/o M with PMH of Diabetes (on insulin), Htn (says not on meds, but pharmacy states amlodipine, and carvedilol), anemia, obesity, PSH of Left foot 5th digit amputation, who presented to the ED with complaints of chronic left heel ulcer for 2-3 months. Patient states it has not been healing well. He has been seeing a podiatrist Dr. Ruffin, who sent him to the hospital for further evaluation. Pt says that he has sometimes seen some purple discharge from the wound, he denies any fevers or chills. He also denies any limited range of motion, difficulty with ambulation, pain at the site, or at the ankle joint. He denies any other complaints.     Upon further questioning after lab review. Patient endorses that his PCP told him to see a nephrologist for his kidney but he hadn't had the opportunity to go. He also knew about anemia and endorses some episodes of bleeding both painless BRBPR and black tarry stools around 8 months ago. Has never had a colonoscopy or endoscopy. Patient denies ever having a echocardiogram, or being told he has heart failure.  (25 Oct 2021 04:29)      PMH:Diabetes    No pertinent past medical history    Diabetes mellitus    Hypertension    Diabetic foot ulcer    Obesity      Allergies: No Known Drug Allergies  shellfish (Angioedema)  shellfish (Unknown)    Medications: aspirin  chewable 81 milliGRAM(s) Oral daily  carvedilol 25 milliGRAM(s) Oral every 12 hours  ceFAZolin   IVPB 1000 milliGRAM(s) IV Intermittent every 12 hours  furosemide    Tablet 20 milliGRAM(s) Oral daily  heparin   Injectable 5000 Unit(s) SubCutaneous every 8 hours  insulin glargine Injectable (LANTUS) 10 Unit(s) SubCutaneous at bedtime  insulin lispro (ADMELOG) corrective regimen sliding scale   SubCutaneous three times a day before meals  insulin lispro (ADMELOG) corrective regimen sliding scale   SubCutaneous at bedtime  insulin lispro Injectable (ADMELOG) 3 Unit(s) SubCutaneous three times a day before meals  pantoprazole    Tablet 40 milliGRAM(s) Oral before breakfast  sodium chloride 0.9%. 1000 milliLiter(s) IV Continuous <Continuous>    FH:No pertinent family history in first degree relatives    No pertinent family history in first degree relatives      PSX: No significant past surgical history    History of amputation of toe      SH: Social History:  Denies any tobacco, EOTH use, or recreational drug use (25 Oct 2021 04:29)      Vital Signs Last 24 Hrs  T(C): 36.9 (25 Oct 2021 11:42), Max: 37.2 (24 Oct 2021 23:39)  T(F): 98.5 (25 Oct 2021 11:42), Max: 98.9 (24 Oct 2021 23:39)  HR: 81 (25 Oct 2021 11:42) (81 - 88)  BP: 196/83 (25 Oct 2021 11:42) (159/74 - 196/83)  BP(mean): --  RR: 19 (25 Oct 2021 11:42) (18 - 19)  SpO2: 96% (25 Oct 2021 11:42) (96% - 99%)    LABS                        8.6    10.20 )-----------( 406      ( 25 Oct 2021 13:07 )             26.3               10-25    139  |  105  |  37<H>  ----------------------------<  234<H>  4.3   |  28  |  1.83<H>    Ca    8.8      25 Oct 2021 13:07  Phos  3.2     10-25  Mg     2.1     10-25    TPro  8.6<H>  /  Alb  1.9<L>  /  TBili  0.5  /  DBili  x   /  AST  8<L>  /  ALT  14  /  AlkPhos  104  10-25     WBC Count: 10.20 K/uL (10-25-21 @ 13:07)  WBC Count: 11.24 K/uL (10-25-21 @ 02:59)      ROS  REVIEW OF SYSTEMS: Negative unless otherwise stated in the HPI         PHYSICAL EXAM  GEN: MALU COURTNEY is a pleasant well-nourished, well developed 56y Male in no acute distress, alert awake, and oriented to person, place and time.   LE Focused:  Patient presents with a surgical shoe on his left foot and normal shoegear on the right. Patient is unassisted and unaccompanied. Patient is AOx3 and NAD.    Vasc:  DP pulses non-palpable b/l. PT pulses non-palpable b/l. Dopler exam revealed biphasic DP and PT pulses b/l. Skin temperature was warm to warm to the LLE and warm to cool to the RLE. Generalized non-pitting edema appreciated to the LLE. CFT <3 seconds to the b/l distal hallux.    Derm: Left infracalcaneal wound noted. Measured 7.0 cm x 6.0 cm x 1.0 cm. Malodor appreciated. No drainage. No tunneling. Fibrogranular wound base with normal periwound. Positive PTB. Surrounding tissue felt warm to touch. No streaking or erythema noted to LLE. Cicatrix noted to left 5th ray. Fissure noted to left distal hallux and posterior heels b/l.    Neuro: Light touch sensation absent b/l. Protective sensation grossly diminished b/l.    MSK: Muscle strength 5/5 for all pedal muscle groups b/l. No significant symptomatic limitations upon pedal joint ROM exams b/l. Patient had excessive amount of ankle joint dorsiflexion b/l. Left 5th ray amputation noted.      Imaging:  Xray pending read     Cultures: pending       Patient is a 56y old  Male who presents with a chief complaint of Diabetic foot ulcer (25 Oct 2021 04:29)     Podiatry HPI: 55 y/o diabetic male presents to the emergency department for a left heel wound. Patient states he was told to come to the ED by his podiatrist, Dr. Woodard. Patient states he has had the heel ulcer for 2 months. Patient states that the wound has progressively worsened in the last two months. Patient denies any pain to the wound. Patient states he has been following up with his podiatrist regularly for the past 8 months. Patient states he had a toe removed on his left foot several months ago. Patient admits he has diabetic neuropathy. Patient admits his recent blood sugar test was above 200. Patient denies smoking or drinking. Patient denies constitutional symptoms, denies trauma. No further pedal complaints.    HPI: Pt is a 49 y/o M with PMH of Diabetes (on insulin), Htn (says not on meds, but pharmacy states amlodipine, and carvedilol), anemia, obesity, PSH of Left foot 5th digit amputation, who presented to the ED with complaints of chronic left heel ulcer for 2-3 months. Patient states it has not been healing well. He has been seeing a podiatrist Dr. Ruffin, who sent him to the hospital for further evaluation. Pt says that he has sometimes seen some purple discharge from the wound, he denies any fevers or chills. He also denies any limited range of motion, difficulty with ambulation, pain at the site, or at the ankle joint. He denies any other complaints.     Upon further questioning after lab review. Patient endorses that his PCP told him to see a nephrologist for his kidney but he hadn't had the opportunity to go. He also knew about anemia and endorses some episodes of bleeding both painless BRBPR and black tarry stools around 8 months ago. Has never had a colonoscopy or endoscopy. Patient denies ever having a echocardiogram, or being told he has heart failure.  (25 Oct 2021 04:29)      Medications amLODIPine   Tablet 10 milliGRAM(s) Oral daily  aspirin  chewable 81 milliGRAM(s) Oral daily  carvedilol 25 milliGRAM(s) Oral every 12 hours  ceFAZolin   IVPB 1000 milliGRAM(s) IV Intermittent every 12 hours  ferrous    sulfate 325 milliGRAM(s) Oral daily  furosemide    Tablet 20 milliGRAM(s) Oral daily  heparin   Injectable 5000 Unit(s) SubCutaneous every 8 hours  insulin glargine Injectable (LANTUS) 10 Unit(s) SubCutaneous at bedtime  insulin lispro (ADMELOG) corrective regimen sliding scale   SubCutaneous three times a day before meals  insulin lispro (ADMELOG) corrective regimen sliding scale   SubCutaneous at bedtime  insulin lispro Injectable (ADMELOG) 3 Unit(s) SubCutaneous three times a day before meals  pantoprazole    Tablet 40 milliGRAM(s) Oral before breakfast  senna 2 Tablet(s) Oral at bedtime  sodium chloride 0.9%. 1000 milliLiter(s) IV Continuous <Continuous>    FHNo pertinent family history in first degree relatives    No pertinent family history in first degree relatives    ,   PMHDiabetes    No pertinent past medical history    Diabetes mellitus    Hypertension    Diabetic foot ulcer    Obesity       PSHNo significant past surgical history    History of amputation of toe        Labs                          7.4    8.73  )-----------( 362      ( 26 Oct 2021 16:47 )             22.5      10-26    140  |  109<H>  |  41<H>  ----------------------------<  196<H>  4.8   |  27  |  1.85<H>    Ca    8.4      26 Oct 2021 09:06  Phos  3.6     10-26  Mg     2.2     10-26    TPro  8.6<H>  /  Alb  1.9<L>  /  TBili  0.5  /  DBili  x   /  AST  8<L>  /  ALT  14  /  AlkPhos  104  10-25     Vital Signs Last 24 Hrs  T(C): 36.8 (26 Oct 2021 14:35), Max: 37 (26 Oct 2021 05:05)  T(F): 98.2 (26 Oct 2021 14:35), Max: 98.6 (26 Oct 2021 05:05)  HR: 62 (26 Oct 2021 17:10) (62 - 89)  BP: 166/70 (26 Oct 2021 17:10) (102/46 - 166/70)  BP(mean): 99 (25 Oct 2021 19:15) (99 - 99)  RR: 18 (26 Oct 2021 14:35) (17 - 18)  SpO2: 99% (26 Oct 2021 14:35) (95% - 99%)  Sedimentation Rate, Erythrocyte: 104 mm/Hr (10-25-21 @ 19:38)  Sedimentation Rate, Erythrocyte: 104 mm/Hr (10-25-21 @ 13:07)         C-Reactive Protein, Serum: 66 mg/L (10-26-21 @ 02:58)  C-Reactive Protein, Serum: 75 mg/L (10-26-21 @ 01:59)   WBC Count: 8.73 K/uL (10-26-21 @ 16:47)  WBC Count: 8.08 K/uL (10-26-21 @ 09:06)    PHYSICAL EXAM  GEN: MALU COURTNEY is a pleasant well-nourished, well developed 56y Male in no acute distress, alert awake, and oriented to person, place and time.   LE Focused:  Patient presents with a surgical shoe on his left foot and normal shoegear on the right. Patient is unassisted and unaccompanied. Patient is AOx3 and NAD.    Vasc:  DP pulses non-palpable b/l. PT pulses non-palpable b/l. Dopler exam revealed biphasic DP and PT pulses b/l. Skin temperature was warm to warm to the LLE and warm to cool to the RLE. Generalized non-pitting edema appreciated to the LLE. CFT <3 seconds to the b/l distal hallux.  Derm: Left infracalcaneal wound noted. Measured 7.0 cm x 6.0 cm x 1.0 cm. Malodor appreciated. No drainage. No tunneling. Fibrogranular wound base with normal periwound. Positive PTB. Surrounding tissue felt warm to touch. No streaking or erythema noted to LLE. Cicatrix noted to left 5th ray. Fissure noted to left distal hallux and posterior heels b/l.  Neuro: Light touch sensation absent b/l. Protective sensation grossly diminished b/l.  MSK: Muscle strength 5/5 for all pedal muscle groups b/l. No significant symptomatic limitations upon pedal joint ROM exams b/l. Patient had excessive amount of ankle joint dorsiflexion b/l. Left 5th ray amputation noted.      Imaging:    EXAM:  MR FOOT LT                            PROCEDURE DATE:  10/26/2021          INTERPRETATION:  EXAMINATION: MRI of the left ankle/hindfoot without contrast    CLINICAL INFORMATION: Left foot ulcer. Evaluate for osteomyelitis.    TECHNIQUE: Multiplanar, multisequential MR imaging was performed.    FINDINGS: There is a cutaneous wound along the plantar aspect of the calcaneus with adjacent skin thickening and confluent subcutaneous soft tissue infiltration that may be secondary to cellulitis/phlegmon and/or ill-defined scar tissue. There is nonspecific high T2 signal throughout the adjacent lateral half of the calcaneus without associated bony destruction or confluent low T1 marrow signal. The findings are nonspecific and may be secondaryto stress reaction and/or osteomyelitis.    There is also edema signal throughout the talus talus, likely stress reaction, although, osteomyelitis cannot entirely excluded. There appears to be a small loose body in the anterior tibiotalar recess. Patient is status post transmetatarsal amputation of the fifth ray. There is a small to moderate-sized tibiotalar joint effusion. 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    There is diffuse fatty atrophy and high T2 signal of musculature, consistent with denervation. There is marked thickening and poor definition of the central cord of the plantar fascia with adjacent bony productive changes. There is also severe thickening/tendinosis of the noninsertional portion ofthe partially imaged Achilles tendon.    There is diffuse subcutaneous soft tissue edema.    IMPRESSION: Cutaneous wound along the plantar aspect of the calcaneus with adjacent confluent subcutaneous soft tissue infiltration that may be secondary to phlegmon/infection and/or scarring. Correlate clinically.  Marrow edema signal throughout the adjacent lateral aspect of the calcaneus may be secondary to stress reaction and/or osteomyelitis.  Edema in the talus is favored to be secondary to stress reaction.  Severe insertional Achilles tendinosis.  Marked thickening and poor definition of the central cord of plantar fascia with adjacent bony productive changes.    --- End of Report ---            SHANNAN SANTANA MD; Attending Radiologist  This document has been electronically signed. Oct 26 2021 12:01PM      EXAM:  XR FOOT COMP MIN 3 VIEWS LT                            PROCEDURE DATE:  10/25/2021          INTERPRETATION:  LEFT foot    CLINICAL INFORMATION: LEFT heel ulcer. Assess osteomyelitis.    TECHNIQUE: AP,lateral and oblique views.    FINDINGS:  Large  heel soft tissue ulceration.. Adjacent calcaneus shows no gross osteolysis..  There is diffuse soft tissue swelling.  Status post fifth transmetatarsal amputation.  Degenerative arthrosis of the phalanges.    IMPRESSION:    Large heel soft tissue ulceration..  No radiographic evidence of osteomyelitis.    If osteomyelitis is considered  despite conservative therapy, and soft tissue / bone infection requires further assessment, follow-up MRI recommended.        --- End of Report ---            MARYAM KONG MD; Attending Radiologist  This document has been electronically signed. Oct 25 2021  6:17PM         Cultures: pending

## 2021-10-26 NOTE — PROGRESS NOTE ADULT - ASSESSMENT
A:   Left diabetic infracalcaneal ulcer  Diabetes Type 2 with peripheral neuropathy    P:   Patient evaluated, chart reviewed  Xray reviewed - pending read   ESR and CRP ordered  Ulcer evaluated to LLE   Excisional debridement of Left heel ulcer using sterile #15 blade down to and including subcutaneous tissue removing all nonviable soft tissue.   PT tolerated procedure well   Obtained culture of the wound  Dressing applied Santyl, 4x4s, ABD pad, sherrie, and ace bandage.   Surgical shoe applied to LLE  Patient to be NWB to LLE  Patient to keep dressing clean, dry, and intact to LLE  Pod plan: pending culture result, recommend MRI of the left foot - wound probes to bone  Discussed possible wound debridement with graft application  Recommend ID consult  Continue LWC  Will follow while in house   Discussed with Attending Dr. Magana  A:   Left diabetic infracalcaneal ulcer  Diabetes Type 2 with peripheral neuropathy    P:   Patient evaluated, chart reviewed  xray/MRI reviewed  ESR and CRP ordered  Ulcer evaluated to LLE   Excisional debridement of Left heel ulcer using sterile #15 blade down to and including subcutaneous tissue removing all nonviable soft tissue.   PT tolerated procedure well   Dressing applied Santyl, 4x4s, ABD pad, sherrie, and ace bandage.   Surgical shoe applied to LLE  Patient to be NWB to LLE  Patient to keep dressing clean, dry, and intact to LLE  Pod plan: OR tomorrow 10/27 at 12pm for L heel wound debridement with somagen graft application and bone biopsy with Dr. Magana  Pt NPO after midnight 10/26  COVID pending   Discussed with Attending Dr. Magana

## 2021-10-27 ENCOUNTER — RESULT REVIEW (OUTPATIENT)
Age: 56
End: 2021-10-27

## 2021-10-27 DIAGNOSIS — I10 ESSENTIAL (PRIMARY) HYPERTENSION: ICD-10-CM

## 2021-10-27 LAB
-  AMPICILLIN/SULBACTAM: SIGNIFICANT CHANGE UP
-  CEFAZOLIN: SIGNIFICANT CHANGE UP
-  CLINDAMYCIN: SIGNIFICANT CHANGE UP
-  DAPTOMYCIN: SIGNIFICANT CHANGE UP
-  ERYTHROMYCIN: SIGNIFICANT CHANGE UP
-  GENTAMICIN: SIGNIFICANT CHANGE UP
-  LINEZOLID: SIGNIFICANT CHANGE UP
-  OXACILLIN: SIGNIFICANT CHANGE UP
-  PENICILLIN: SIGNIFICANT CHANGE UP
-  RIFAMPIN: SIGNIFICANT CHANGE UP
-  TETRACYCLINE: SIGNIFICANT CHANGE UP
-  TRIMETHOPRIM/SULFAMETHOXAZOLE: SIGNIFICANT CHANGE UP
-  VANCOMYCIN: SIGNIFICANT CHANGE UP
ANION GAP SERPL CALC-SCNC: 4 MMOL/L — LOW (ref 5–17)
APTT BLD: 36 SEC — HIGH (ref 27.5–35.5)
BUN SERPL-MCNC: 40 MG/DL — HIGH (ref 7–18)
CALCIUM SERPL-MCNC: 8.7 MG/DL — SIGNIFICANT CHANGE UP (ref 8.4–10.5)
CHLORIDE SERPL-SCNC: 110 MMOL/L — HIGH (ref 96–108)
CO2 SERPL-SCNC: 26 MMOL/L — SIGNIFICANT CHANGE UP (ref 22–31)
CREAT SERPL-MCNC: 1.82 MG/DL — HIGH (ref 0.5–1.3)
CULTURE RESULTS: SIGNIFICANT CHANGE UP
GLUCOSE BLDC GLUCOMTR-MCNC: 124 MG/DL — HIGH (ref 70–99)
GLUCOSE BLDC GLUCOMTR-MCNC: 144 MG/DL — HIGH (ref 70–99)
GLUCOSE BLDC GLUCOMTR-MCNC: 172 MG/DL — HIGH (ref 70–99)
GLUCOSE SERPL-MCNC: 181 MG/DL — HIGH (ref 70–99)
HCT VFR BLD CALC: 21.9 % — LOW (ref 39–50)
HGB BLD-MCNC: 7.3 G/DL — LOW (ref 13–17)
INR BLD: 1.12 RATIO — SIGNIFICANT CHANGE UP (ref 0.88–1.16)
MAGNESIUM SERPL-MCNC: 2.2 MG/DL — SIGNIFICANT CHANGE UP (ref 1.6–2.6)
MCHC RBC-ENTMCNC: 24.8 PG — LOW (ref 27–34)
MCHC RBC-ENTMCNC: 33.3 GM/DL — SIGNIFICANT CHANGE UP (ref 32–36)
MCV RBC AUTO: 74.5 FL — LOW (ref 80–100)
METHOD TYPE: SIGNIFICANT CHANGE UP
NRBC # BLD: 0 /100 WBCS — SIGNIFICANT CHANGE UP (ref 0–0)
ORGANISM # SPEC MICROSCOPIC CNT: SIGNIFICANT CHANGE UP
ORGANISM # SPEC MICROSCOPIC CNT: SIGNIFICANT CHANGE UP
PHOSPHATE SERPL-MCNC: 3.8 MG/DL — SIGNIFICANT CHANGE UP (ref 2.5–4.5)
PLATELET # BLD AUTO: 395 K/UL — SIGNIFICANT CHANGE UP (ref 150–400)
POTASSIUM SERPL-MCNC: 4.7 MMOL/L — SIGNIFICANT CHANGE UP (ref 3.5–5.3)
POTASSIUM SERPL-SCNC: 4.7 MMOL/L — SIGNIFICANT CHANGE UP (ref 3.5–5.3)
PROTHROM AB SERPL-ACNC: 13.2 SEC — SIGNIFICANT CHANGE UP (ref 10.6–13.6)
RBC # BLD: 2.94 M/UL — LOW (ref 4.2–5.8)
RBC # FLD: 15.9 % — HIGH (ref 10.3–14.5)
SODIUM SERPL-SCNC: 140 MMOL/L — SIGNIFICANT CHANGE UP (ref 135–145)
SPECIMEN SOURCE: SIGNIFICANT CHANGE UP
WBC # BLD: 7.65 K/UL — SIGNIFICANT CHANGE UP (ref 3.8–10.5)
WBC # FLD AUTO: 7.65 K/UL — SIGNIFICANT CHANGE UP (ref 3.8–10.5)

## 2021-10-27 PROCEDURE — 73620 X-RAY EXAM OF FOOT: CPT | Mod: 26,LT

## 2021-10-27 PROCEDURE — 88311 DECALCIFY TISSUE: CPT | Mod: 26

## 2021-10-27 PROCEDURE — 88305 TISSUE EXAM BY PATHOLOGIST: CPT | Mod: 26

## 2021-10-27 RX ORDER — SODIUM CHLORIDE 9 MG/ML
1000 INJECTION, SOLUTION INTRAVENOUS
Refills: 0 | Status: DISCONTINUED | OUTPATIENT
Start: 2021-10-27 | End: 2021-10-27

## 2021-10-27 RX ORDER — INSULIN LISPRO 100/ML
VIAL (ML) SUBCUTANEOUS
Refills: 0 | Status: DISCONTINUED | OUTPATIENT
Start: 2021-10-27 | End: 2021-11-03

## 2021-10-27 RX ORDER — INSULIN LISPRO 100/ML
3 VIAL (ML) SUBCUTANEOUS
Refills: 0 | Status: DISCONTINUED | OUTPATIENT
Start: 2021-10-27 | End: 2021-11-03

## 2021-10-27 RX ORDER — AMLODIPINE BESYLATE 2.5 MG/1
10 TABLET ORAL DAILY
Refills: 0 | Status: DISCONTINUED | OUTPATIENT
Start: 2021-10-27 | End: 2021-11-03

## 2021-10-27 RX ORDER — PANTOPRAZOLE SODIUM 20 MG/1
40 TABLET, DELAYED RELEASE ORAL
Refills: 0 | Status: DISCONTINUED | OUTPATIENT
Start: 2021-10-27 | End: 2021-11-03

## 2021-10-27 RX ORDER — INSULIN GLARGINE 100 [IU]/ML
10 INJECTION, SOLUTION SUBCUTANEOUS AT BEDTIME
Refills: 0 | Status: DISCONTINUED | OUTPATIENT
Start: 2021-10-27 | End: 2021-11-03

## 2021-10-27 RX ORDER — HEPARIN SODIUM 5000 [USP'U]/ML
5000 INJECTION INTRAVENOUS; SUBCUTANEOUS EVERY 8 HOURS
Refills: 0 | Status: DISCONTINUED | OUTPATIENT
Start: 2021-10-27 | End: 2021-11-03

## 2021-10-27 RX ORDER — ASPIRIN/CALCIUM CARB/MAGNESIUM 324 MG
81 TABLET ORAL DAILY
Refills: 0 | Status: DISCONTINUED | OUTPATIENT
Start: 2021-10-27 | End: 2021-11-03

## 2021-10-27 RX ORDER — METOCLOPRAMIDE HCL 10 MG
10 TABLET ORAL ONCE
Refills: 0 | Status: DISCONTINUED | OUTPATIENT
Start: 2021-10-27 | End: 2021-10-27

## 2021-10-27 RX ORDER — INSULIN LISPRO 100/ML
VIAL (ML) SUBCUTANEOUS AT BEDTIME
Refills: 0 | Status: DISCONTINUED | OUTPATIENT
Start: 2021-10-27 | End: 2021-11-03

## 2021-10-27 RX ORDER — CARVEDILOL PHOSPHATE 80 MG/1
25 CAPSULE, EXTENDED RELEASE ORAL EVERY 12 HOURS
Refills: 0 | Status: DISCONTINUED | OUTPATIENT
Start: 2021-10-27 | End: 2021-11-03

## 2021-10-27 RX ORDER — SENNA PLUS 8.6 MG/1
2 TABLET ORAL AT BEDTIME
Refills: 0 | Status: DISCONTINUED | OUTPATIENT
Start: 2021-10-27 | End: 2021-11-03

## 2021-10-27 RX ORDER — FUROSEMIDE 40 MG
20 TABLET ORAL DAILY
Refills: 0 | Status: DISCONTINUED | OUTPATIENT
Start: 2021-10-27 | End: 2021-10-29

## 2021-10-27 RX ORDER — FERROUS SULFATE 325(65) MG
325 TABLET ORAL DAILY
Refills: 0 | Status: DISCONTINUED | OUTPATIENT
Start: 2021-10-27 | End: 2021-11-03

## 2021-10-27 RX ORDER — CEFAZOLIN SODIUM 1 G
1000 VIAL (EA) INJECTION EVERY 12 HOURS
Refills: 0 | Status: DISCONTINUED | OUTPATIENT
Start: 2021-10-27 | End: 2021-10-28

## 2021-10-27 RX ORDER — FENTANYL CITRATE 50 UG/ML
50 INJECTION INTRAVENOUS
Refills: 0 | Status: DISCONTINUED | OUTPATIENT
Start: 2021-10-27 | End: 2021-10-27

## 2021-10-27 RX ADMIN — Medication 100 MILLIGRAM(S): at 04:34

## 2021-10-27 RX ADMIN — PANTOPRAZOLE SODIUM 40 MILLIGRAM(S): 20 TABLET, DELAYED RELEASE ORAL at 04:35

## 2021-10-27 RX ADMIN — HEPARIN SODIUM 5000 UNIT(S): 5000 INJECTION INTRAVENOUS; SUBCUTANEOUS at 04:34

## 2021-10-27 RX ADMIN — Medication 81 MILLIGRAM(S): at 17:33

## 2021-10-27 RX ADMIN — Medication 20 MILLIGRAM(S): at 04:34

## 2021-10-27 RX ADMIN — CARVEDILOL PHOSPHATE 25 MILLIGRAM(S): 80 CAPSULE, EXTENDED RELEASE ORAL at 04:34

## 2021-10-27 RX ADMIN — PANTOPRAZOLE SODIUM 40 MILLIGRAM(S): 20 TABLET, DELAYED RELEASE ORAL at 17:33

## 2021-10-27 RX ADMIN — Medication 20 MILLIGRAM(S): at 17:34

## 2021-10-27 RX ADMIN — Medication 3 UNIT(S): at 17:34

## 2021-10-27 RX ADMIN — AMLODIPINE BESYLATE 10 MILLIGRAM(S): 2.5 TABLET ORAL at 17:36

## 2021-10-27 RX ADMIN — AMLODIPINE BESYLATE 10 MILLIGRAM(S): 2.5 TABLET ORAL at 04:34

## 2021-10-27 RX ADMIN — Medication 325 MILLIGRAM(S): at 17:34

## 2021-10-27 RX ADMIN — INSULIN GLARGINE 10 UNIT(S): 100 INJECTION, SOLUTION SUBCUTANEOUS at 22:30

## 2021-10-27 RX ADMIN — Medication 100 MILLIGRAM(S): at 18:18

## 2021-10-27 RX ADMIN — SENNA PLUS 2 TABLET(S): 8.6 TABLET ORAL at 21:49

## 2021-10-27 RX ADMIN — HEPARIN SODIUM 5000 UNIT(S): 5000 INJECTION INTRAVENOUS; SUBCUTANEOUS at 21:47

## 2021-10-27 RX ADMIN — CARVEDILOL PHOSPHATE 25 MILLIGRAM(S): 80 CAPSULE, EXTENDED RELEASE ORAL at 17:34

## 2021-10-27 NOTE — DIETITIAN INITIAL EVALUATION ADULT. - PROBLEM SELECTOR PLAN 2
Pt with bp of 196/78  denies taking any medications, but pharmacy states carvedilol 25bid and amlodipine 10  will give home meds  monitor bp  adjust as needed  dash diet

## 2021-10-27 NOTE — DIETITIAN INITIAL EVALUATION ADULT. - PERTINENT MEDS FT
MEDICATIONS  (STANDING):  amLODIPine   Tablet 10 milliGRAM(s) Oral daily  aspirin  chewable 81 milliGRAM(s) Oral daily  carvedilol 25 milliGRAM(s) Oral every 12 hours  ceFAZolin   IVPB 1000 milliGRAM(s) IV Intermittent every 12 hours  ferrous    sulfate 325 milliGRAM(s) Oral daily  furosemide    Tablet 20 milliGRAM(s) Oral daily  heparin   Injectable 5000 Unit(s) SubCutaneous every 8 hours  insulin glargine Injectable (LANTUS) 10 Unit(s) SubCutaneous at bedtime  insulin lispro (ADMELOG) corrective regimen sliding scale   SubCutaneous three times a day before meals  insulin lispro (ADMELOG) corrective regimen sliding scale   SubCutaneous at bedtime  insulin lispro Injectable (ADMELOG) 3 Unit(s) SubCutaneous three times a day before meals  pantoprazole    Tablet 40 milliGRAM(s) Oral before breakfast  senna 2 Tablet(s) Oral at bedtime  sodium chloride 0.9%. 1000 milliLiter(s) (100 mL/Hr) IV Continuous <Continuous>    MEDICATIONS  (PRN):

## 2021-10-27 NOTE — PROGRESS NOTE ADULT - ASSESSMENT
A:   Left diabetic infracalcaneal ulcer  Diabetes Type 2 with peripheral neuropathy    P:   Patient evaluated, chart reviewed  xray/MRI reviewed  ESR and CRP reviewed  Bandage left intact to LLE  Pt consented for OR L heel wound debridement with somagen graft application and bone biopsy with Dr. Magana at 12pm today 10/27  Discussed all procedures and risks to patients satisfication  Pt NPO after midnight 10/26  COVID negative 10/26  Discussed with Attending Dr. Magana

## 2021-10-27 NOTE — PROGRESS NOTE ADULT - SUBJECTIVE AND OBJECTIVE BOX
HPI:  56 YOM admitted with L DFU.  Debrided at bedside, culture collected, ID consulted, MRI ordered and ABX started.  Pt for OR debridement today, MD to provide clearance.    OVERNIGHT EVENTS:  No new overnight events.  Seen and examined at bedside.     REVIEW OF SYSTEMS:      CONSTITUTIONAL: No fever  EYES: no acute visual disturbances  NECK: No pain or stiffness  RESPIRATORY: No cough; No shortness of breath  CARDIOVASCULAR: No chest pain, no palpitations  GASTROINTESTINAL: No pain. No nausea, vomiting or diarrhea   NEUROLOGICAL: No headache or numbness, no tremors  MUSCULOSKELETAL: + pain  GENITOURINARY: no dysuria, no frequency, no hesitancy  PSYCHIATRY: no depression, no anxiety  ALL OTHER  ROS negative        Vital Signs Last 24 Hrs  T(C): 36.8 (26 Oct 2021 20:53), Max: 36.8 (26 Oct 2021 14:35)  T(F): 98.2 (26 Oct 2021 20:53), Max: 98.2 (26 Oct 2021 14:35)  HR: 59 (26 Oct 2021 20:53) (59 - 89)  BP: 140/57 (27 Oct 2021 05:47) (102/46 - 166/70)  BP(mean): --  RR: 18 (27 Oct 2021 05:47) (17 - 18)  SpO2: 95% (27 Oct 2021 05:47) (95% - 99%)    ________________________________________________  PHYSICAL EXAM:    GENERAL: NAD  HEENT: Normocephalic; conjunctivae and sclerae clear;  NECK : supple, no JVD  CHEST/LUNG: Clear to auscultation; Nonlabored  HEART: S1 S2  regular  ABDOMEN: Soft, Nontender, Nondistended; Bowel sounds present  EXTREMITIES: no cyanosis; no LE edema; no calf tenderness  SKIN: L foot ulceration with dressing  NERVOUS SYSTEM:  Alert; no new deficits    _________________________________________________  CURRENT MEDICATIONS:    MEDICATIONS  (STANDING):  amLODIPine   Tablet 10 milliGRAM(s) Oral daily  aspirin  chewable 81 milliGRAM(s) Oral daily  carvedilol 25 milliGRAM(s) Oral every 12 hours  ceFAZolin   IVPB 1000 milliGRAM(s) IV Intermittent every 12 hours  ferrous    sulfate 325 milliGRAM(s) Oral daily  furosemide    Tablet 20 milliGRAM(s) Oral daily  heparin   Injectable 5000 Unit(s) SubCutaneous every 8 hours  insulin glargine Injectable (LANTUS) 10 Unit(s) SubCutaneous at bedtime  insulin lispro (ADMELOG) corrective regimen sliding scale   SubCutaneous three times a day before meals  insulin lispro (ADMELOG) corrective regimen sliding scale   SubCutaneous at bedtime  insulin lispro Injectable (ADMELOG) 3 Unit(s) SubCutaneous three times a day before meals  pantoprazole    Tablet 40 milliGRAM(s) Oral before breakfast  senna 2 Tablet(s) Oral at bedtime  sodium chloride 0.9%. 1000 milliLiter(s) (100 mL/Hr) IV Continuous <Continuous>    MEDICATIONS  (PRN):      __________________________________________________  LABS:                          7.3    7.65  )-----------( 395      ( 27 Oct 2021 07:09 )             21.9     10-27    140  |  110<H>  |  40<H>  ----------------------------<  181<H>  4.7   |  26  |  1.82<H>    Ca    8.7      27 Oct 2021 07:09  Phos  3.8     10-27  Mg     2.2     10-27    TPro  8.6<H>  /  Alb  1.9<L>  /  TBili  0.5  /  DBili  x   /  AST  8<L>  /  ALT  14  /  AlkPhos  104  10-25    PT/INR - ( 27 Oct 2021 07:09 )   PT: 13.2 sec;   INR: 1.12 ratio         PTT - ( 27 Oct 2021 07:09 )  PTT:36.0 sec    CAPILLARY BLOOD GLUCOSE      POCT Blood Glucose.: 172 mg/dL (27 Oct 2021 07:41)  POCT Blood Glucose.: 195 mg/dL (26 Oct 2021 21:12)  POCT Blood Glucose.: 135 mg/dL (26 Oct 2021 16:51)  POCT Blood Glucose.: 169 mg/dL (26 Oct 2021 12:05)      __________________________________________________  RADIOLOGY & ADDITIONAL TESTS:    Imaging Personally Reviewed:  YES    < from: MR Foot No Cont, Left (10.26.21 @ 11:09) >  IMPRESSION: Cutaneous wound along the plantar aspect of the calcaneus with adjacent confluent subcutaneous soft tissue infiltration that may be secondary to phlegmon/infection and/or scarring. Correlate clinically.  Marrow edema signal throughout the adjacent lateral aspect of the calcaneus may be secondary to stress reaction and/or osteomyelitis.  Edema in the talus is favored to be secondary to stress reaction.  Severe insertional Achilles tendinosis.  Marked thickening and poor definition of the central cord of plantar fascia with adjacent bony productive changes.    < end of copied text >    Consultant(s) Notes Reviewed:   YES     Plan of care was discussed with patient and /or primary care giver; all questions and concerns were addressed and care was aligned with patient's wishes.    Plan discussed with attending and consulting physicians.

## 2021-10-27 NOTE — PROGRESS NOTE ADULT - SUBJECTIVE AND OBJECTIVE BOX
Patient is a 56y old  Male who presents with a chief complaint of Diabetic foot ulcer (25 Oct 2021 04:29)     Podiatry Interval HPI: Pt seen bedside resting comfortably. Pt is amendable and consented for L heel wound debridement with somagen graft application and bone biopsy. Pt has been NPO since midnight 10/27 and is covid negative since 10/26. Denies N/V/F/C/SOB. No other pedal complaints.     Podiatry HPI: 57 y/o diabetic male presents to the emergency department for a left heel wound. Patient states he was told to come to the ED by his podiatrist, Dr. Woodard. Patient states he has had the heel ulcer for 2 months. Patient states that the wound has progressively worsened in the last two months. Patient denies any pain to the wound. Patient states he has been following up with his podiatrist regularly for the past 8 months. Patient states he had a toe removed on his left foot several months ago. Patient admits he has diabetic neuropathy. Patient admits his recent blood sugar test was above 200. Patient denies smoking or drinking. Patient denies constitutional symptoms, denies trauma. No further pedal complaints.    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 Patient states it has not been healing well. He has been seeing a podiatrist Dr. Ruffin, who sent him to the hospital for further evaluation. Pt says that he has sometimes seen some purple discharge from the wound, he denies any fevers or chills. He also denies any limited range of motion, difficulty with ambulation, pain at the site, or at the ankle joint. He denies any other complaints.     Upon further questioning after lab review. Patient endorses that his PCP told him to see a nephrologist for his kidney but he hadn't had the opportunity to go. He also knew about anemia and endorses some episodes of bleeding both painless BRBPR and black tarry stools around 8 months ago. Has never had a colonoscopy or endoscopy. Patient denies ever having a echocardiogram, or being told he has heart failure.  (25 Oct 2021 04:29)      Medications amLODIPine   Tablet 10 milliGRAM(s) Oral daily  aspirin  chewable 81 milliGRAM(s) Oral daily  carvedilol 25 milliGRAM(s) Oral every 12 hours  ceFAZolin   IVPB 1000 milliGRAM(s) IV Intermittent every 12 hours  ferrous    sulfate 325 milliGRAM(s) Oral daily  furosemide    Tablet 20 milliGRAM(s) Oral daily  heparin   Injectable 5000 Unit(s) SubCutaneous every 8 hours  insulin glargine Injectable (LANTUS) 10 Unit(s) SubCutaneous at bedtime  insulin lispro (ADMELOG) corrective regimen sliding scale   SubCutaneous three times a day before meals  insulin lispro (ADMELOG) corrective regimen sliding scale   SubCutaneous at bedtime  insulin lispro Injectable (ADMELOG) 3 Unit(s) SubCutaneous three times a day before meals  pantoprazole    Tablet 40 milliGRAM(s) Oral before breakfast  senna 2 Tablet(s) Oral at bedtime  sodium chloride 0.9%. 1000 milliLiter(s) IV Continuous <Continuous>    FHNo pertinent family history in first degree relatives    No pertinent family history in first degree relatives    ,   PMHDiabetes    No pertinent past medical history    Diabetes mellitus    Hypertension    Diabetic foot ulcer    Obesity       PSHNo significant past surgical history    History of amputation of toe        Labs                          7.3    7.65  )-----------( 395      ( 27 Oct 2021 07:09 )             21.9      10-27    140  |  110<H>  |  40<H>  ----------------------------<  181<H>  4.7   |  26  |  1.82<H>    Ca    8.7      27 Oct 2021 07:09  Phos  3.8     10-27  Mg     2.2     10-27    TPro  8.6<H>  /  Alb  1.9<L>  /  TBili  0.5  /  DBili  x   /  AST  8<L>  /  ALT  14  /  AlkPhos  104  10-25     Vital Signs Last 24 Hrs  T(C): 36.8 (26 Oct 2021 20:53), Max: 36.8 (26 Oct 2021 14:35)  T(F): 98.2 (26 Oct 2021 20:53), Max: 98.2 (26 Oct 2021 14:35)  HR: 59 (26 Oct 2021 20:53) (59 - 89)  BP: 140/57 (27 Oct 2021 05:47) (102/46 - 166/70)  BP(mean): --  RR: 18 (27 Oct 2021 05:47) (17 - 18)  SpO2: 95% (27 Oct 2021 05:47) (95% - 99%)  Sedimentation Rate, Erythrocyte: 104 mm/Hr (10-25-21 @ 19:38)  Sedimentation Rate, Erythrocyte: 104 mm/Hr (10-25-21 @ 13:07)         C-Reactive Protein, Serum: 66 mg/L (10-26-21 @ 02:58)  C-Reactive Protein, Serum: 75 mg/L (10-26-21 @ 01:59)   WBC Count: 7.65 K/uL (10-27-21 @ 07:09)  WBC Count: 8.73 K/uL (10-26-21 @ 16:47)    PHYSICAL EXAM  GEN: MALU COURTNEY is a pleasant well-nourished, well developed 56y Male in no acute distress, alert awake, and oriented to person, place and time.   LE Focused:  Patient presents with a surgical shoe on his left foot and normal shoegear on the right. Patient is unassisted and unaccompanied. Patient is AOx3 and NAD.    Vasc:  DP pulses non-palpable b/l. PT pulses non-palpable b/l. Dopler exam revealed biphasic DP and PT pulses b/l. Skin temperature was warm to warm to the LLE and warm to cool to the RLE. Generalized non-pitting edema appreciated to the LLE. CFT <3 seconds to the b/l distal hallux.  Derm: Left infracalcaneal wound noted. Measured 7.0 cm x 6.0 cm x 1.0 cm. Malodor appreciated. No drainage. No tunneling. Fibrogranular wound base with normal periwound. Positive PTB. Surrounding tissue felt warm to touch. No streaking or erythema noted to LLE. Cicatrix noted to left 5th ray. Fissure noted to left distal hallux and posterior heels b/l.  Neuro: Light touch sensation absent b/l. Protective sensation grossly diminished b/l.  MSK: Muscle strength 5/5 for all pedal muscle groups b/l. No significant symptomatic limitations upon pedal joint ROM exams b/l. Patient had excessive amount of ankle joint dorsiflexion b/l. Left 5th ray amputation noted.      Imaging:    EXAM:  MR FOOT LT                            PROCEDURE DATE:  10/26/2021          INTERPRETATION:  EXAMINATION: MRI of the left ankle/hindfoot without contrast    CLINICAL INFORMATION: Left foot ulcer. Evaluate for osteomyelitis.    TECHNIQUE: Multiplanar, multisequential MR imaging was performed.    FINDINGS: There is a cutaneous wound along the plantar aspect of the calcaneus with adjacent skin thickening and confluent subcutaneous soft tissue infiltration that may be secondary to cellulitis/phlegmon and/or ill-defined scar tissue. There is nonspecific high T2 signal throughout the adjacent lateral half of the calcaneus without associated bony destruction or confluent low T1 marrow signal. The findings are nonspecific and may be secondaryto stress reaction and/or osteomyelitis.    There is also edema signal throughout the talus talus, likely stress reaction, although, osteomyelitis cannot entirely excluded. There appears to be a small loose body in the anterior tibiotalar recess. Patient is status post transmetatarsal amputation of the fifth ray. There is a small to moderate-sized tibiotalar joint effusion. 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    There is diffuse fatty atrophy and high T2 signal of musculature, consistent with denervation. There is marked thickening and poor definition of the central cord of the plantar fascia with adjacent bony productive changes. There is also severe thickening/tendinosis of the noninsertional portion ofthe partially imaged Achilles tendon.    There is diffuse subcutaneous soft tissue edema.    IMPRESSION: Cutaneous wound along the plantar aspect of the calcaneus with adjacent confluent subcutaneous soft tissue infiltration that may be secondary to phlegmon/infection and/or scarring. Correlate clinically.  Marrow edema signal throughout the adjacent lateral aspect of the calcaneus may be secondary to stress reaction and/or osteomyelitis.  Edema in the talus is favored to be secondary to stress reaction.  Severe insertional Achilles tendinosis.  Marked thickening and poor definition of the central cord of plantar fascia with adjacent bony productive changes.    --- End of Report ---            SHANNAN SANTANA MD; Attending Radiologist  This document has been electronically signed. Oct 26 2021 12:01PM      EXAM:  XR FOOT COMP MIN 3 VIEWS LT                            PROCEDURE DATE:  10/25/2021          INTERPRETATION:  LEFT foot    CLINICAL INFORMATION: LEFT heel ulcer. Assess osteomyelitis.    TECHNIQUE: AP,lateral and oblique views.    FINDINGS:  Large  heel soft tissue ulceration.. Adjacent calcaneus shows no gross osteolysis..  There is diffuse soft tissue swelling.  Status post fifth transmetatarsal amputation.  Degenerative arthrosis of the phalanges.    IMPRESSION:    Large heel soft tissue ulceration..  No radiographic evidence of osteomyelitis.    If osteomyelitis is considered  despite conservative therapy, and soft tissue / bone infection requires further assessment, follow-up MRI recommended.        --- End of Report ---            MARYAM KONG MD; Attending Radiologist  This document has been electronically signed. Oct 25 2021  6:17PM         Cultures: pending

## 2021-10-27 NOTE — DIETITIAN INITIAL EVALUATION ADULT. - PROBLEM SELECTOR PLAN 4
pt with hemoglobin of 8.1  unknown baseline  endorses Gi bleed around 8 months ago  f/u iron panel, ferritin  f/u fobt  pt on ferrous gluconate at home  Never had a colonoscopy or endoscopy

## 2021-10-27 NOTE — PROGRESS NOTE ADULT - PROBLEM SELECTOR PLAN 1
MRI noted above  S/p bedside debridement  Pt to go to OR today for further debridement, MD to provide clearance  Continue Ansef (day 3)  Blood cultures NGTD  Surgical culture grew staph, awaiting susceptibility  PT consult on hold until postop  Podiatry following  Dr. Love, ID, following

## 2021-10-27 NOTE — DIETITIAN INITIAL EVALUATION ADULT. - PROBLEM SELECTOR PLAN 5
pmh of diabetes on Tresiba 18 and humalog 4  continue insulin, admelog 3 units, lantus 10u, Sliding scale  monitor bs  adjust insulin as needed  f/u [id number]  carb consistent diet

## 2021-10-27 NOTE — PROGRESS NOTE ADULT - PROBLEM SELECTOR PLAN 4
Likely SCAR  Total Iron 24  Hgb 7.3 (8.1 on admit)  FOBT neg  No overt s/s bleeding  VSS  Start Ferrous sulfate and stool softner  Transfuse Hgb < 7  Follow up CBC in AM

## 2021-10-27 NOTE — DIETITIAN INITIAL EVALUATION ADULT. - PERTINENT LABORATORY DATA
10-27 Na140 mmol/L Glu 181 mg/dL<H> K+ 4.7 mmol/L Cr  1.82 mg/dL<H> BUN 40 mg/dL<H>   10-27 Phos 3.8 mg/dL   10-25 Alb 1.9 g/dL<L>     10-26 Chol 111 mg/dL LDL --    HDL 35 mg/dL<L> Trig 40 mg/dL    10-26-21 @ 01:43 HgbA1C 8.5  10-25-21 @ 19:33 HgbA1C 8.4

## 2021-10-27 NOTE — PROGRESS NOTE ADULT - ASSESSMENT
Pt is a 51 y/o M with PMH of Diabetes, Htn (not on meds), obesity, PSH of Left foot 5th digit amputation, who presented to the ED with complaints of chronic left heel ulcer for 2-3 months.

## 2021-10-27 NOTE — DIETITIAN INITIAL EVALUATION ADULT. - PROBLEM SELECTOR PLAN 1
Pt with poorly healing ulcer on left foot heel   follows up outpatinet with podiatry Dr. Young?  WBC slightly elevated (11.24)  S/p Vanc and zosyn in ED  Continue abx Ancef  Strict blood glucose control   Podiatry consult  Podiatry concerned for bone involvement  f/u MRI  ID to be consulted in am by primary team: Dr. Love

## 2021-10-27 NOTE — DIETITIAN INITIAL EVALUATION ADULT. - FACTORS AFF FOOD INTAKE
diabetic foot ulcer. type 2 diabetes, HTN, obesity, CARRIE, amenia, h/o toe amputation/pain/Spiritism/ethnic/cultural/personal food preferences/other (specify)

## 2021-10-27 NOTE — BRIEF OPERATIVE NOTE - NSICDXBRIEFPREOP_GEN_ALL_CORE_FT
PRE-OP DIAGNOSIS:  Open wound of left heel 27-Oct-2021 13:43:31  Shellie Miranda  Osteomyelitis of ankle or foot, acute 27-Oct-2021 13:47:06 Left calcaneus Shellie Miranda

## 2021-10-27 NOTE — DIETITIAN INITIAL EVALUATION ADULT. - OTHER INFO
Patient from home lives with family & recently d/savi from Patient from home lives with family. Visited pt. alert & awake, awaiting to be taken to OR for further debridement today. Per pt. usual po intake good, dx. with type 2 diabetes >15yrs ago, tries to be consistent with 3 meals & may skip at least one, follows "no carbohydrate only meats/vegetables during the week but includes only on the weekends", complaint with "diabetic/Pens x2" at home (A1C 8.5%), also has gained >35 Lbs past 4 months since became more sedentary due his "foot infection",  Lbs. Pt. voiced has received "diet education" in the past & willing to accept necessary nutrition education to stabilized his "medical condition" therefore RD to follow up once s/p OR & stable. Podiatry following, pending Left heel wound debridement w/ somagen graft application and bone biopsy today.

## 2021-10-27 NOTE — PROGRESS NOTE ADULT - PROBLEM SELECTOR PLAN 5
Controlled  Pt takes Tresiba and Humalog at home  Continue Lantus coverage QHS  Continue sliding scale insulin coverage  Continue glucose monitoring  Continue low carb diet

## 2021-10-27 NOTE — BRIEF OPERATIVE NOTE - NSICDXBRIEFPROCEDURE_GEN_ALL_CORE_FT
PROCEDURES:  Debridement of wound of foot with application of fenestrated human acellular dermal matrix 27-Oct-2021 13:41:27 Left heel Shellie Miranda  Bone biopsy of left calcaneus 27-Oct-2021 13:42:34  Shellie Miranda

## 2021-10-27 NOTE — BRIEF OPERATIVE NOTE - NSICDXBRIEFPOSTOP_GEN_ALL_CORE_FT
POST-OP DIAGNOSIS:  Osteomyelitis of ankle or foot, acute 27-Oct-2021 13:47:24 Left calcaneus hSellie Miranda  Open wound of left heel 27-Oct-2021 13:48:22  Shellie Miranda

## 2021-10-27 NOTE — DIETITIAN INITIAL EVALUATION ADULT. - ADD RECOMMEND
Add MVI/minerals/Vit.C 500mg BID for wound healing & Zinc Sulfate 220mg once daily as needed as medically feasible

## 2021-10-27 NOTE — DIETITIAN INITIAL EVALUATION ADULT. - ORAL INTAKE PTA/DIET HISTORY
Per pt. mostly consumes meats & vegetables w/no carbohydrates during the week. Only have carbohydrates on weekend - rice, potatoes or pasta. Avoids salt. Per pt. mostly consumes meats & vegetables w/no carbohydrates during the week.  Includes carbohydrates with meal only on the weekend - rice, potatoes or pasta. Avoids salt.

## 2021-10-28 LAB
GLUCOSE BLDC GLUCOMTR-MCNC: 125 MG/DL — HIGH (ref 70–99)
GLUCOSE BLDC GLUCOMTR-MCNC: 143 MG/DL — HIGH (ref 70–99)
GLUCOSE BLDC GLUCOMTR-MCNC: 154 MG/DL — HIGH (ref 70–99)
GLUCOSE BLDC GLUCOMTR-MCNC: 171 MG/DL — HIGH (ref 70–99)
GRAM STN FLD: SIGNIFICANT CHANGE UP
SPECIMEN SOURCE: SIGNIFICANT CHANGE UP

## 2021-10-28 PROCEDURE — 99232 SBSQ HOSP IP/OBS MODERATE 35: CPT

## 2021-10-28 RX ORDER — VANCOMYCIN HCL 1 G
1000 VIAL (EA) INTRAVENOUS
Refills: 0 | Status: DISCONTINUED | OUTPATIENT
Start: 2021-10-28 | End: 2021-10-31

## 2021-10-28 RX ORDER — VANCOMYCIN HCL 1 G
1000 VIAL (EA) INTRAVENOUS EVERY 24 HOURS
Refills: 0 | Status: DISCONTINUED | OUTPATIENT
Start: 2021-10-28 | End: 2021-10-28

## 2021-10-28 RX ADMIN — Medication 3 UNIT(S): at 12:26

## 2021-10-28 RX ADMIN — Medication 3 UNIT(S): at 08:06

## 2021-10-28 RX ADMIN — Medication 1: at 08:07

## 2021-10-28 RX ADMIN — Medication 3 UNIT(S): at 17:09

## 2021-10-28 RX ADMIN — Medication 325 MILLIGRAM(S): at 12:29

## 2021-10-28 RX ADMIN — HEPARIN SODIUM 5000 UNIT(S): 5000 INJECTION INTRAVENOUS; SUBCUTANEOUS at 06:07

## 2021-10-28 RX ADMIN — CARVEDILOL PHOSPHATE 25 MILLIGRAM(S): 80 CAPSULE, EXTENDED RELEASE ORAL at 06:06

## 2021-10-28 RX ADMIN — Medication 1: at 12:25

## 2021-10-28 RX ADMIN — Medication 20 MILLIGRAM(S): at 06:06

## 2021-10-28 RX ADMIN — PANTOPRAZOLE SODIUM 40 MILLIGRAM(S): 20 TABLET, DELAYED RELEASE ORAL at 06:07

## 2021-10-28 RX ADMIN — Medication 250 MILLIGRAM(S): at 22:04

## 2021-10-28 RX ADMIN — AMLODIPINE BESYLATE 10 MILLIGRAM(S): 2.5 TABLET ORAL at 06:06

## 2021-10-28 RX ADMIN — INSULIN GLARGINE 10 UNIT(S): 100 INJECTION, SOLUTION SUBCUTANEOUS at 22:05

## 2021-10-28 RX ADMIN — Medication 100 MILLIGRAM(S): at 06:06

## 2021-10-28 RX ADMIN — SENNA PLUS 2 TABLET(S): 8.6 TABLET ORAL at 22:06

## 2021-10-28 RX ADMIN — Medication 81 MILLIGRAM(S): at 12:29

## 2021-10-28 RX ADMIN — HEPARIN SODIUM 5000 UNIT(S): 5000 INJECTION INTRAVENOUS; SUBCUTANEOUS at 13:33

## 2021-10-28 RX ADMIN — Medication 250 MILLIGRAM(S): at 13:32

## 2021-10-28 RX ADMIN — HEPARIN SODIUM 5000 UNIT(S): 5000 INJECTION INTRAVENOUS; SUBCUTANEOUS at 22:05

## 2021-10-28 RX ADMIN — CARVEDILOL PHOSPHATE 25 MILLIGRAM(S): 80 CAPSULE, EXTENDED RELEASE ORAL at 17:11

## 2021-10-28 NOTE — CONSULT NOTE ADULT - ASSESSMENT
A:   Left diabetic infracalcaneal ulcer  Diabetes Type 2 with peripheral neuropathy    P:   Patient evaluated, chart reviewed  Xray reviewed - pending read   ESR and CRP ordered  Ulcer evaluated to LLE   Excisional debridement of Left heel ulcer using sterile #15 blade down to and including subcutaneous tissue removing all nonviable soft tissue.   PT tolerated procedure well   Obtained culture of the wound  Dressing applied Santyl, 4x4s, ABD pad, sherrie, and ace bandage.   Surgical shoe applied to LLE  Patient to be NWB to LLE  Patient to keep dressing clean, dry, and intact to LLE  Pod plan: pending culture result, recommend MRI of the left foot - wound probes to bone  Discussed possible wound debridement with graft application  Recommend ID consult  Continue LWC  Will follow while in house   Discussed with Attending Dr. Magana 
Left foot and leg Cellulitis  R/O Osteomyelitis of left foot - s/p bone biopsy    Plan - Increase vancomycin to 1gm iv q12hrs  monitor Vancomycin trough.  await bone biopsy results prior to getting a PICC line if needed.

## 2021-10-28 NOTE — PROGRESS NOTE ADULT - ASSESSMENT
51 y/o M with Pmhx  of Diabetes, HTN (not on meds), obesity, PSH of Left foot 5th digit amputation, who presented to the ED with complaints of chronic left heel ulcer for 2-3 months. MRI revealed possible OM ; followed by Podiatry and s/p debridement 10/27. Cultures positive for MRSA; started on vancomycin and ID consulted.

## 2021-10-28 NOTE — PHYSICAL THERAPY INITIAL EVALUATION ADULT - PERTINENT HX OF CURRENT PROBLEM, REHAB EVAL
Pt is a 49 y/o M with PMH of Diabetes (on insulin), Htn (says not on meds, but pharmacy states amlodipine, and carvedilol), anemia, obesity, PSH of Left foot 5th digit amputation, who presented to the ED with complaints of chronic left heel ulcer for 2-3 months.

## 2021-10-28 NOTE — PROGRESS NOTE ADULT - PROBLEM SELECTOR PLAN 2
Controlled  Pt takes Amlodipine, Carvedilol and Lasix at home  Continue home regimen  Continue to monitor BP

## 2021-10-28 NOTE — PROGRESS NOTE ADULT - SUBJECTIVE AND OBJECTIVE BOX
Patient is a 56y old  Male who presents with a chief complaint of Diabetic foot ulcer (25 Oct 2021 04:29)     Podiatry Interval HPI: Pt seen bedside resting comfortably s/p left foot wound debridement with graft application (10/27). Pt denies any pain to the foot.  Denies N/V/F/C/SOB. No other pedal complaints.     Podiatry HPI: 55 y/o diabetic male presents to the emergency department for a left heel wound. Patient states he was told to come to the ED by his podiatrist, Dr. Woodard. Patient states he has had the heel ulcer for 2 months. Patient states that the wound has progressively worsened in the last two months. Patient denies any pain to the wound. Patient states he has been following up with his podiatrist regularly for the past 8 months. Patient states he had a toe removed on his left foot several months ago. Patient admits he has diabetic neuropathy. Patient admits his recent blood sugar test was above 200. Patient denies smoking or drinking. Patient denies constitutional symptoms, denies trauma. No further pedal complaints.    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 Patient states it has not been healing well. He has been seeing a podiatrist Dr. Ruffin, who sent him to the hospital for further evaluation. Pt says that he has sometimes seen some purple discharge from the wound, he denies any fevers or chills. He also denies any limited range of motion, difficulty with ambulation, pain at the site, or at the ankle joint. He denies any other complaints.     Upon further questioning after lab review. Patient endorses that his PCP told him to see a nephrologist for his kidney but he hadn't had the opportunity to go. He also knew about anemia and endorses some episodes of bleeding both painless BRBPR and black tarry stools around 8 months ago. Has never had a colonoscopy or endoscopy. Patient denies ever having a echocardiogram, or being told he has heart failure.  (25 Oct 2021 04:29)    Medications amLODIPine   Tablet 10 milliGRAM(s) Oral daily  aspirin  chewable 81 milliGRAM(s) Oral daily  carvedilol 25 milliGRAM(s) Oral every 12 hours  ferrous    sulfate 325 milliGRAM(s) Oral daily  furosemide    Tablet 20 milliGRAM(s) Oral daily  heparin   Injectable 5000 Unit(s) SubCutaneous every 8 hours  insulin glargine Injectable (LANTUS) 10 Unit(s) SubCutaneous at bedtime  insulin lispro (ADMELOG) corrective regimen sliding scale   SubCutaneous three times a day before meals  insulin lispro (ADMELOG) corrective regimen sliding scale   SubCutaneous at bedtime  insulin lispro Injectable (ADMELOG) 3 Unit(s) SubCutaneous three times a day before meals  pantoprazole    Tablet 40 milliGRAM(s) Oral before breakfast  senna 2 Tablet(s) Oral at bedtime  sodium chloride 0.9%. 1000 milliLiter(s) IV Continuous <Continuous>  vancomycin  IVPB 1000 milliGRAM(s) IV Intermittent every 24 hours    FH: No pertinent family history in first degree relatives    No pertinent family history in first degree relatives    PMH: Diabetes    No pertinent past medical history    Diabetes mellitus    Hypertension    Diabetic foot ulcer    Obesity    PSH: No significant past surgical history    History of amputation of toe      Labs                          7.3    7.65  )-----------( 395      ( 27 Oct 2021 07:09 )             21.9      10-27    140  |  110<H>  |  40<H>  ----------------------------<  181<H>  4.7   |  26  |  1.82<H>    Ca    8.7      27 Oct 2021 07:09  Phos  3.8     10-27  Mg     2.2     10-27    Vital Signs Last 24 Hrs  T(C): 37 (28 Oct 2021 05:02), Max: 37.1 (27 Oct 2021 21:32)  T(F): 98.6 (28 Oct 2021 05:02), Max: 98.8 (27 Oct 2021 21:32)  HR: 65 (28 Oct 2021 13:07) (59 - 67)  BP: 134/63 (28 Oct 2021 13:07) (134/63 - 157/78)  BP(mean): --  RR: 18 (28 Oct 2021 13:07) (17 - 18)  SpO2: 98% (28 Oct 2021 13:07) (95% - 98%)  Sedimentation Rate, Erythrocyte: 104 mm/Hr (10-25-21 @ 19:38)  Sedimentation Rate, Erythrocyte: 104 mm/Hr (10-25-21 @ 13:07)         C-Reactive Protein, Serum: 66 mg/L (10-26-21 @ 02:58)  C-Reactive Protein, Serum: 75 mg/L (10-26-21 @ 01:59)     PT/INR - ( 27 Oct 2021 07:09 )   PT: 13.2 sec;   INR: 1.12 ratio      PTT - ( 27 Oct 2021 07:09 )  PTT:36.0 sec    CAPILLARY BLOOD GLUCOSE    POCT Blood Glucose.: 154 mg/dL (28 Oct 2021 11:25)  POCT Blood Glucose.: 171 mg/dL (28 Oct 2021 08:02)  POCT Blood Glucose.: 124 mg/dL (27 Oct 2021 16:47)    ROS: All others negative unless otherwise stated in the HPI      PHYSICAL EXAM  GEN: MALU COURTNEY is a pleasant well-nourished, well developed 56y Male in no acute distress, alert awake, and oriented to person, place and time.   LE Focused:  Patient presents with a surgical shoe on his left foot and normal shoegear on the right. Patient is unassisted and unaccompanied. Patient is AOx3 and NAD.    Vasc:  DP pulses non-palpable b/l. PT pulses non-palpable b/l. Dopler exam revealed biphasic DP and PT pulses b/l. Skin temperature was warm to warm to the LLE and warm to cool to the RLE. Generalized non-pitting edema appreciated to the LLE. CFT <3 seconds to the b/l distal hallux.  Derm: Left infracalcaneal wound noted. Measured 7.0 cm x 6.0 cm x 1.0 cm. Malodor appreciated. No drainage. No tunneling. Fibrogranular wound base with normal periwound. Positive PTB. Surrounding tissue felt warm to touch. No streaking or erythema noted to LLE. Cicatrix noted to left 5th ray. Fissure noted to left distal hallux and posterior heels b/l.  Neuro: Light touch sensation absent b/l. Protective sensation grossly diminished b/l.  MSK: Muscle strength 5/5 for all pedal muscle groups b/l. No significant symptomatic limitations upon pedal joint ROM exams b/l. Patient had excessive amount of ankle joint dorsiflexion b/l. Left 5th ray amputation noted.      Imaging:    EXAM:  MR FOOT LT                          PROCEDURE DATE:  10/26/2021      INTERPRETATION:  EXAMINATION: MRI of the left ankle/hindfoot without contrast    CLINICAL INFORMATION: Left foot ulcer. Evaluate for osteomyelitis.    TECHNIQUE: Multiplanar, multisequential MR imaging was performed.    FINDINGS: There is a cutaneous wound along the plantar aspect of the calcaneus with adjacent skin thickening and confluent subcutaneous soft tissue infiltration that may be secondary to cellulitis/phlegmon and/or ill-defined scar tissue. There is nonspecific high T2 signal throughout the adjacent lateral half of the calcaneus without associated bony destruction or confluent low T1 marrow signal. The findings are nonspecific and may be secondaryto stress reaction and/or osteomyelitis.    There is also edema signal throughout the talus talus, likely stress reaction, although, osteomyelitis cannot entirely excluded. There appears to be a small loose body in the anterior tibiotalar recess. Patient is status post transmetatarsal amputation of the fifth ray. There is a small to moderate-sized tibiotalar joint effusion. 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    There is diffuse fatty atrophy and high T2 signal of musculature, consistent with denervation. There is marked thickening and poor definition of the central cord of the plantar fascia with adjacent bony productive changes. There is also severe thickening/tendinosis of the noninsertional portion ofthe partially imaged Achilles tendon.    There is diffuse subcutaneous soft tissue edema.    IMPRESSION: Cutaneous wound along the plantar aspect of the calcaneus with adjacent confluent subcutaneous soft tissue infiltration that may be secondary to phlegmon/infection and/or scarring. Correlate clinically.  Marrow edema signal throughout the adjacent lateral aspect of the calcaneus may be secondary to stress reaction and/or osteomyelitis.  Edema in the talus is favored to be secondary to stress reaction.  Severe insertional Achilles tendinosis.  Marked thickening and poor definition of the central cord of plantar fascia with adjacent bony productive changes.      EXAM:  XR FOOT COMP MIN 3 VIEWS LT                          PROCEDURE DATE:  10/25/2021      INTERPRETATION:  LEFT foot    CLINICAL INFORMATION: LEFT heel ulcer. Assess osteomyelitis.    TECHNIQUE: AP,lateral and oblique views.    FINDINGS:  Large  heel soft tissue ulceration.. Adjacent calcaneus shows no gross osteolysis..  There is diffuse soft tissue swelling.  Status post fifth transmetatarsal amputation.  Degenerative arthrosis of the phalanges.    IMPRESSION:    Large heel soft tissue ulceration..  No radiographic evidence of osteomyelitis.    If osteomyelitis is considered  despite conservative therapy, and soft tissue / bone infection requires further assessment, follow-up MRI recommended.      Cultures:   Specimen Source: .Surgical Swab left foot wound (10.25.21 @ 20:57) Culture Results:   Culture yields >4 types of aerobic and/or anaerobic bacteria   Call client services within 7 days if further workup is clinically   indicated. Culture includes   Few Methicillin Resistant Staphylococcus aureus (10.25.21 @ 20:57)     Specimen Source: .Tissue Other, Left calcaneus bone biopsy (10.27.21 @ 20:58) Gram Stain:   No polymorphonuclear cells seen per low power field   No organisms seen per oil power field (10.27.21 @ 20:58)

## 2021-10-28 NOTE — CONSULT NOTE ADULT - SUBJECTIVE AND OBJECTIVE BOX
HPI:  Pt is a 51 y/o M with PMH of Diabetes (on insulin), Htn (says not on meds, but pharmacy states amlodipine, and carvedilol), anemia, obesity, PSH of Left foot 5th digit amputation, who presented to the ED with complaints of chronic left heel ulcer for 2-3 months. Patient states it has not been healing well. He has been seeing a podiatrist Dr. Ruffin, who sent him to the hospital for further evaluation. Pt says that he has sometimes seen some purple discharge from the wound, he denies any fevers or chills. He also denies any limited range of motion, difficulty with ambulation, pain at the site, or at the ankle joint. He denies any other complaints.     Upon further questioning after lab review. Patient endorses that his PCP told him to see a nephrologist for his kidney but he hadn't had the opportunity to go. He also knew about anemia and endorses some episodes of bleeding both painless BRBPR and black tarry stools around 8 months ago. Has never had a colonoscopy or endoscopy. Patient denies ever having a echocardiogram, or being told he has heart failure.  (25 Oct 2021 04:29)      PAST MEDICAL & SURGICAL HISTORY:  Diabetes    Diabetes mellitus    Hypertension    Diabetic foot ulcer    Obesity    No significant past surgical history    History of amputation of toe        No Known Drug Allergies  shellfish (Angioedema)  shellfish (Unknown)      Meds:  amLODIPine   Tablet 10 milliGRAM(s) Oral daily  aspirin  chewable 81 milliGRAM(s) Oral daily  carvedilol 25 milliGRAM(s) Oral every 12 hours  ferrous    sulfate 325 milliGRAM(s) Oral daily  furosemide    Tablet 20 milliGRAM(s) Oral daily  heparin   Injectable 5000 Unit(s) SubCutaneous every 8 hours  insulin glargine Injectable (LANTUS) 10 Unit(s) SubCutaneous at bedtime  insulin lispro (ADMELOG) corrective regimen sliding scale   SubCutaneous three times a day before meals  insulin lispro (ADMELOG) corrective regimen sliding scale   SubCutaneous at bedtime  insulin lispro Injectable (ADMELOG) 3 Unit(s) SubCutaneous three times a day before meals  pantoprazole    Tablet 40 milliGRAM(s) Oral before breakfast  senna 2 Tablet(s) Oral at bedtime  sodium chloride 0.9%. 1000 milliLiter(s) IV Continuous <Continuous>  vancomycin  IVPB 1000 milliGRAM(s) IV Intermittent every 24 hours      SOCIAL HISTORY:  Smoker:  YES / NO        PACK YEARS:                         WHEN QUIT?  ETOH use:  YES / NO               FREQUENCY / QUANTITY:  Ilicit Drug use:  YES / NO  Occupation:  Assisted device use (Cane / Walker):  Live with:    FAMILY HISTORY:  No pertinent family history in first degree relatives    No pertinent family history in first degree relatives        VITALS:  Vital Signs Last 24 Hrs  T(C): 37 (28 Oct 2021 05:02), Max: 37.1 (27 Oct 2021 21:32)  T(F): 98.6 (28 Oct 2021 05:02), Max: 98.8 (27 Oct 2021 21:32)  HR: 65 (28 Oct 2021 13:07) (59 - 67)  BP: 134/63 (28 Oct 2021 13:07) (134/63 - 157/78)  BP(mean): --  RR: 18 (28 Oct 2021 13:07) (17 - 18)  SpO2: 98% (28 Oct 2021 13:07) (95% - 98%)    LABS/DIAGNOSTIC TESTS:                          7.3    7.65  )-----------( 395      ( 27 Oct 2021 07:09 )             21.9     WBC Count: 7.65 K/uL (10-27 @ 07:09)  WBC Count: 8.73 K/uL (10-26 @ 16:47)  WBC Count: 8.08 K/uL (10-26 @ 09:06)      10-27    140  |  110<H>  |  40<H>  ----------------------------<  181<H>  4.7   |  26  |  1.82<H>    Ca    8.7      27 Oct 2021 07:09  Phos  3.8     10-27  Mg     2.2     10-27                PT/INR - ( 27 Oct 2021 07:09 )   PT: 13.2 sec;   INR: 1.12 ratio         PTT - ( 27 Oct 2021 07:09 )  PTT:36.0 sec    LACTATE:    ABG -     CULTURES:   .Tissue Other, Left calcaneus bone biopsy  10-27 @ 20:58 --  --    No polymorphonuclear cells seen per low power field  No organisms seen per oil power field      .Surgical Swab left foot wound  10-25 @ 20:57   Culture yields >4 types of aerobic and/or anaerobic bacteria  Call client services within 7 days if further workup is clinically  indicated. Culture includes  Few Methicillin Resistant Staphylococcus aureus  --  Methicillin resistant Staphylococcus aureus      .Blood Blood-Peripheral  10-25 @ 06:50   No growth to date.  --  --          RADIOLOGY:< from: MR Foot No Cont, Left (10.26.21 @ 11:09) >  EXAM:  MR FOOT LT                            PROCEDURE DATE:  10/26/2021          INTERPRETATION:  EXAMINATION: MRI of the left ankle/hindfoot without contrast    CLINICAL INFORMATION: Left foot ulcer. Evaluate for osteomyelitis.    TECHNIQUE: Multiplanar, multisequential MR imaging was performed.    FINDINGS: There is a cutaneous wound along the plantar aspect of the calcaneus with adjacent skin thickening and confluent subcutaneous soft tissue infiltration that may be secondary to cellulitis/phlegmon and/or ill-defined scar tissue. There is nonspecific high T2 signal throughout the adjacent lateral half of the calcaneus without associated bony destruction or confluent low T1 marrow signal. The findings are nonspecific and may be secondaryto stress reaction and/or osteomyelitis.    There is also edema signal throughout the talus talus, likely stress reaction, although, osteomyelitis cannot entirely excluded. There appears to be a small loose body in the anterior tibiotalar recess. Patient is status post transmetatarsal amputation of the fifth ray. There is a small to moderate-sized tibiotalar joint effusion. 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    There is diffuse fatty atrophy and high T2 signal of musculature, consistent with denervation. There is marked thickening and poor definition of the central cord of the plantar fascia with adjacent bony productive changes. There is also severe thickening/tendinosis of the noninsertional portion ofthe partially imaged Achilles tendon.    There is diffuse subcutaneous soft tissue edema.    IMPRESSION: Cutaneous wound along the plantar aspect of the calcaneus with adjacent confluent subcutaneous soft tissue infiltration that may be secondary to phlegmon/infection and/or scarring. Correlate clinically.  Marrow edema signal throughout the adjacent lateral aspect of the calcaneus may be secondary to stress reaction and/or osteomyelitis.  Edema in the talus is favored to be secondary to stress reaction.  Severe insertional Achilles tendinosis.  Marked thickening and poor definition of the central cord of plantar fascia with adjacent bony productive changes.    --- End of Report ---            SHANNAN SANTANA MD; Attending Radiologist  This document has been electronically signed. Oct 26 2021 12:01PM    < end of copied text >  ------------------------------------------------------------------------------------------------------------------------------------------------------------------------------------------------------------------------------------------------------  EXAM:  XR CHEST AP OR PA 1V                            PROCEDURE DATE:  10/25/2021          INTERPRETATION:  CLINICAL INDICATION: 56 years  Male with admission for diabetic foot ulcer.    COMPARISON: 3/11/2021    AP view of the chest demonstrates stable right basilar discoid atelectasis and elevated right hemidiaphragm. The left costophrenic angle is partially omitted. There is diffuse haziness over the left lung which may be related to positioning or to effusion layering posteriorly. There is no pneumothorax. The pulmonary vasculature is normal. A metallic spring overlies the right lung base.    The heart, mediastinum and jovanny cannot be assessed due to projection. The patient's chin partially obscures lung apices.    Mild thoracic degenerative changes are present.    IMPRESSION:    Left lung haziness may reflect positioning or effusion layering posteriorly.    Right basilar discoid atelectasis.    Metallic spring overlies the right lung base.    --- End of Report ---            PEDRO PEDERSEN MD; Attending Radiologist  This document has been electronically signed. Oct 25 2021  1:11PM    -------------------------------------------------------------------------------------------------------------------------------------------------------------------------------------------------  EXAM:  XR FOOT COMP MIN 3 VIEWS LT                            PROCEDURE DATE:  10/25/2021          INTERPRETATION:  LEFT foot    CLINICAL INFORMATION: LEFT heel ulcer. Assess osteomyelitis.    TECHNIQUE: AP,lateral and oblique views.    FINDINGS:  Large  heel soft tissue ulceration.. Adjacent calcaneus shows no gross osteolysis..  There is diffuse soft tissue swelling.  Status post fifth transmetatarsal amputation.  Degenerative arthrosis of the phalanges.    IMPRESSION:    Large heel soft tissue ulceration..  No radiographic evidence of osteomyelitis.    If osteomyelitis is considered  despite conservative therapy, and soft tissue / bone infection requires further assessment, follow-up MRI recommended.        --- End of Report ---            MARYAM KONG MD; Attending Radiologist  This document has been electronically signed. Oct 25 2021  6:17PM    < end of copied text >        ROS  [  ] UNABLE TO ELICIT               HPI:  Pt is a 51 y/o M with PMH of Diabetes (on insulin), Htn (says not on meds, but pharmacy states amlodipine, and carvedilol), anemia, obesity, PSH of Left foot 5th digit amputation, who presented to the ED with complaints of chronic left heel ulcer for 2-3 months. Patient states it has not been healing well. He has been seeing a podiatrist Dr. Ruffin, who sent him to the hospital for further evaluation. Pt says that he has sometimes seen some purple discharge from the wound, he denies any fevers or chills. He also denies any limited range of motion, difficulty with ambulation, pain at the site, or at the ankle joint. He denies any other complaints.     Upon further questioning after lab review. Patient endorses that his PCP told him to see a nephrologist for his kidney but he hadn't had the opportunity to go. He also knew about anemia and endorses some episodes of bleeding both painless BRBPR and black tarry stools around 8 months ago. Has never had a colonoscopy or endoscopy. Patient denies ever having a echocardiogram, or being told he has heart failure.  (25 Oct 2021 04:29)      History as above , pt who was sent to the hospital by his podiatrist as he had some drainage from his left heel ulcer, he also c/o swelling and warmth of his left foot and leg , he denies any fevers or chills and no pain in his left foot or leg. He went to the OR and had his left leg ulcer debrided and had a bone biopsy to r/o Osteomyelitis.      PAST MEDICAL & SURGICAL HISTORY:  Diabetes    Diabetes mellitus    Hypertension    Diabetic foot ulcer    Obesity    No significant past surgical history    History of amputation of toe        No Known Drug Allergies  shellfish (Angioedema)  shellfish (Unknown)      Meds:  amLODIPine   Tablet 10 milliGRAM(s) Oral daily  aspirin  chewable 81 milliGRAM(s) Oral daily  carvedilol 25 milliGRAM(s) Oral every 12 hours  ferrous    sulfate 325 milliGRAM(s) Oral daily  furosemide    Tablet 20 milliGRAM(s) Oral daily  heparin   Injectable 5000 Unit(s) SubCutaneous every 8 hours  insulin glargine Injectable (LANTUS) 10 Unit(s) SubCutaneous at bedtime  insulin lispro (ADMELOG) corrective regimen sliding scale   SubCutaneous three times a day before meals  insulin lispro (ADMELOG) corrective regimen sliding scale   SubCutaneous at bedtime  insulin lispro Injectable (ADMELOG) 3 Unit(s) SubCutaneous three times a day before meals  pantoprazole    Tablet 40 milliGRAM(s) Oral before breakfast  senna 2 Tablet(s) Oral at bedtime  sodium chloride 0.9%. 1000 milliLiter(s) IV Continuous <Continuous>  vancomycin  IVPB 1000 milliGRAM(s) IV Intermittent every 24 hours      SOCIAL HISTORY:  Smoker:  no  ETOH use:  no      FAMILY HISTORY:  No pertinent family history in first degree relatives    No pertinent family history in first degree relatives        VITALS:  Vital Signs Last 24 Hrs  T(C): 37 (28 Oct 2021 05:02), Max: 37.1 (27 Oct 2021 21:32)  T(F): 98.6 (28 Oct 2021 05:02), Max: 98.8 (27 Oct 2021 21:32)  HR: 65 (28 Oct 2021 13:07) (59 - 67)  BP: 134/63 (28 Oct 2021 13:07) (134/63 - 157/78)  BP(mean): --  RR: 18 (28 Oct 2021 13:07) (17 - 18)  SpO2: 98% (28 Oct 2021 13:07) (95% - 98%)    LABS/DIAGNOSTIC TESTS:                          7.3    7.65  )-----------( 395      ( 27 Oct 2021 07:09 )             21.9     WBC Count: 7.65 K/uL (10-27 @ 07:09)  WBC Count: 8.73 K/uL (10-26 @ 16:47)  WBC Count: 8.08 K/uL (10-26 @ 09:06)      10-27    140  |  110<H>  |  40<H>  ----------------------------<  181<H>  4.7   |  26  |  1.82<H>    Ca    8.7      27 Oct 2021 07:09  Phos  3.8     10-27  Mg     2.2     10-27                PT/INR - ( 27 Oct 2021 07:09 )   PT: 13.2 sec;   INR: 1.12 ratio         PTT - ( 27 Oct 2021 07:09 )  PTT:36.0 sec    LACTATE:    ABG -     CULTURES:   .Tissue Other, Left calcaneus bone biopsy  10-27 @ 20:58 --  --    No polymorphonuclear cells seen per low power field  No organisms seen per oil power field      .Surgical Swab left foot wound  10-25 @ 20:57   Culture yields >4 types of aerobic and/or anaerobic bacteria  Call client services within 7 days if further workup is clinically  indicated. Culture includes  Few Methicillin Resistant Staphylococcus aureus  --  Methicillin resistant Staphylococcus aureus      .Blood Blood-Peripheral  10-25 @ 06:50   No growth to date.  --  --          RADIOLOGY:< from: MR Foot No Cont, Left (10.26.21 @ 11:09) >  EXAM:  MR FOOT LT                            PROCEDURE DATE:  10/26/2021          INTERPRETATION:  EXAMINATION: MRI of the left ankle/hindfoot without contrast    CLINICAL INFORMATION: Left foot ulcer. Evaluate for osteomyelitis.    TECHNIQUE: Multiplanar, multisequential MR imaging was performed.    FINDINGS: There is a cutaneous wound along the plantar aspect of the calcaneus with adjacent skin thickening and confluent subcutaneous soft tissue infiltration that may be secondary to cellulitis/phlegmon and/or ill-defined scar tissue. There is nonspecific high T2 signal throughout the adjacent lateral half of the calcaneus without associated bony destruction or confluent low T1 marrow signal. The findings are nonspecific and may be secondaryto stress reaction and/or osteomyelitis.    There is also edema signal throughout the talus talus, likely stress reaction, although, osteomyelitis cannot entirely excluded. There appears to be a small loose body in the anterior tibiotalar recess. Patient is status post transmetatarsal amputation of the fifth ray. There is a small to moderate-sized tibiotalar joint effusion. 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    There is diffuse fatty atrophy and high T2 signal of musculature, consistent with denervation. There is marked thickening and poor definition of the central cord of the plantar fascia with adjacent bony productive changes. There is also severe thickening/tendinosis of the noninsertional portion ofthe partially imaged Achilles tendon.    There is diffuse subcutaneous soft tissue edema.    IMPRESSION: Cutaneous wound along the plantar aspect of the calcaneus with adjacent confluent subcutaneous soft tissue infiltration that may be secondary to phlegmon/infection and/or scarring. Correlate clinically.  Marrow edema signal throughout the adjacent lateral aspect of the calcaneus may be secondary to stress reaction and/or osteomyelitis.  Edema in the talus is favored to be secondary to stress reaction.  Severe insertional Achilles tendinosis.  Marked thickening and poor definition of the central cord of plantar fascia with adjacent bony productive changes.    --- End of Report ---            SHANNAN SANTANA MD; Attending Radiologist  This document has been electronically signed. Oct 26 2021 12:01PM    < end of copied text >  ------------------------------------------------------------------------------------------------------------------------------------------------------------------------------------------------------------------------------------------------------  EXAM:  XR CHEST AP OR PA 1V                            PROCEDURE DATE:  10/25/2021          INTERPRETATION:  CLINICAL INDICATION: 56 years  Male with admission for diabetic foot ulcer.    COMPARISON: 3/11/2021    AP view of the chest demonstrates stable right basilar discoid atelectasis and elevated right hemidiaphragm. The left costophrenic angle is partially omitted. There is diffuse haziness over the left lung which may be related to positioning or to effusion layering posteriorly. There is no pneumothorax. The pulmonary vasculature is normal. A metallic spring overlies the right lung base.    The heart, mediastinum and jovanny cannot be assessed due to projection. The patient's chin partially obscures lung apices.    Mild thoracic degenerative changes are present.    IMPRESSION:    Left lung haziness may reflect positioning or effusion layering posteriorly.    Right basilar discoid atelectasis.    Metallic spring overlies the right lung base.    --- End of Report ---            PEDRO PEDERSEN MD; Attending Radiologist  This document has been electronically signed. Oct 25 2021  1:11PM    -------------------------------------------------------------------------------------------------------------------------------------------------------------------------------------------------  EXAM:  XR FOOT COMP MIN 3 VIEWS LT                            PROCEDURE DATE:  10/25/2021          INTERPRETATION:  LEFT foot    CLINICAL INFORMATION: LEFT heel ulcer. Assess osteomyelitis.    TECHNIQUE: AP,lateral and oblique views.    FINDINGS:  Large  heel soft tissue ulceration.. Adjacent calcaneus shows no gross osteolysis..  There is diffuse soft tissue swelling.  Status post fifth transmetatarsal amputation.  Degenerative arthrosis of the phalanges.    IMPRESSION:    Large heel soft tissue ulceration..  No radiographic evidence of osteomyelitis.    If osteomyelitis is considered  despite conservative therapy, and soft tissue / bone infection requires further assessment, follow-up MRI recommended.        --- End of Report ---            MARYAM KONG MD; Attending Radiologist  This document has been electronically signed. Oct 25 2021  6:17PM    < end of copied text >        ROS  [  ] UNABLE TO ELICIT

## 2021-10-28 NOTE — CHART NOTE - NSCHARTNOTEFT_GEN_A_CORE
Reassessment: follow up with nutrition education     Patient is a 56y old  Male who presents with a chief complaint of Diabetic foot ulcer (28 Oct 2021 16:20)      Factors impacting intake: [ ] none [ ] nausea  [ ] vomiting [ ] diarrhea [ ] constipation  [ ]chewing problems [ ] swallowing issues  [X ] other: Type 2 diabetes, obesity, HTN, acute osteomyelitis, anemia     Diet Prescription: Diet, Regular:   Consistent Carbohydrate {No Snacks}  DASH/ TLC {Sodium & Cholesterol Restricted} (10-27-21 @ 15:41)    Intake: Patient visit, alert & awake, states "feeling well" & consumed >65% of meals today, no reports of GI distress, seen by Podiatry/team with ongoing left foot wound care. Accepted nutrition education & copies on My Plate Planner with carbohydrate counting, reviewed food list with menu samples & reinforced the importance of consuming consistent carbohydrate meal plan daily once d/savi to home, pt. receptive, pending ZACHARY placement, rec. c/w diet as ordered a medically feasible. RD available.     Daily weight: 335.5 Lbs on 10/25/21  % Weight Change: stable     Pertinent Medications: MEDICATIONS  (STANDING):  amLODIPine   Tablet 10 milliGRAM(s) Oral daily  aspirin  chewable 81 milliGRAM(s) Oral daily  carvedilol 25 milliGRAM(s) Oral every 12 hours  ferrous    sulfate 325 milliGRAM(s) Oral daily  furosemide    Tablet 20 milliGRAM(s) Oral daily  heparin   Injectable 5000 Unit(s) SubCutaneous every 8 hours  insulin glargine Injectable (LANTUS) 10 Unit(s) SubCutaneous at bedtime  insulin lispro (ADMELOG) corrective regimen sliding scale   SubCutaneous three times a day before meals  insulin lispro (ADMELOG) corrective regimen sliding scale   SubCutaneous at bedtime  insulin lispro Injectable (ADMELOG) 3 Unit(s) SubCutaneous three times a day before meals  pantoprazole    Tablet 40 milliGRAM(s) Oral before breakfast  senna 2 Tablet(s) Oral at bedtime  sodium chloride 0.9%. 1000 milliLiter(s) (100 mL/Hr) IV Continuous <Continuous>  vancomycin  IVPB 1000 milliGRAM(s) IV Intermittent two times a day    MEDICATIONS  (PRN):    Pertinent Labs: 10-27 Na140 mmol/L Glu 181 mg/dL<H> K+ 4.7 mmol/L Cr  1.82 mg/dL<H> BUN 40 mg/dL<H> 10-27 Phos 3.8 mg/dL 10-25 Alb 1.9 g/dL<L> 10-26 Chol 111 mg/dL LDL --    HDL 35 mg/dL<L> Trig 40 mg/dL     CAPILLARY BLOOD GLUCOSE      POCT Blood Glucose.: 154 mg/dL (28 Oct 2021 11:25)  POCT Blood Glucose.: 171 mg/dL (28 Oct 2021 08:02)    Skin: ongoing foot wound care    Estimated Needs:   [ X] no change since previous assessment  [ ] recalculated:     Previous Nutrition Diagnosis:   [ ] Inadequate Energy Intake [ ]Inadequate Oral Intake [ ] Excessive Energy Intake   [ ] Underweight [ X] Increased Nutrient Needs [ ] Overweight/Obesity   [ ] Altered GI Function [ ] Unintended Weight Loss [ ] Food & Nutrition Related Knowledge Deficit [ ] Malnutrition     Nutrition Diagnosis is [X ] ongoing  [ ] resolved [ ] not applicable     New Nutrition Diagnosis: [ ] not applicable     Interventions: To meet nutrition needs   Recommend  [ ] Change Diet To:  [ X] Nutrition Supplement: Add MVI/minerals/Vit. C 500mg BID for wound care & Zinc Sulfate 220mg once daily as needed as medically feasible  [ ] Nutrition Support  [X ] Other: Nursing to continue meal set up and encouragement    Monitoring and Evaluation:   [ X] PO intake [ x ] Tolerance to diet prescription [ x ] weights [ x ] labs[ x ] follow up per protocol  [ ] other:

## 2021-10-28 NOTE — PROGRESS NOTE ADULT - SUBJECTIVE AND OBJECTIVE BOX
NP Note discussed with  Primary Attending; Dr Rodriguez     Patient is a 56y old  Male who presents with a chief complaint of Diabetic foot ulcer (28 Oct 2021 16:20)      INTERVAL HPI/OVERNIGHT EVENTS: Patient seen and examined earlier this am; no new complaints    MEDICATIONS  (STANDING):  amLODIPine   Tablet 10 milliGRAM(s) Oral daily  aspirin  chewable 81 milliGRAM(s) Oral daily  carvedilol 25 milliGRAM(s) Oral every 12 hours  ferrous    sulfate 325 milliGRAM(s) Oral daily  furosemide    Tablet 20 milliGRAM(s) Oral daily  heparin   Injectable 5000 Unit(s) SubCutaneous every 8 hours  insulin glargine Injectable (LANTUS) 10 Unit(s) SubCutaneous at bedtime  insulin lispro (ADMELOG) corrective regimen sliding scale   SubCutaneous three times a day before meals  insulin lispro (ADMELOG) corrective regimen sliding scale   SubCutaneous at bedtime  insulin lispro Injectable (ADMELOG) 3 Unit(s) SubCutaneous three times a day before meals  pantoprazole    Tablet 40 milliGRAM(s) Oral before breakfast  senna 2 Tablet(s) Oral at bedtime  sodium chloride 0.9%. 1000 milliLiter(s) (100 mL/Hr) IV Continuous <Continuous>  vancomycin  IVPB 1000 milliGRAM(s) IV Intermittent two times a day    MEDICATIONS  (PRN):      __________________________________________________  REVIEW OF SYSTEMS:    CONSTITUTIONAL: No fever,   RESPIRATORY: No cough; No shortness of breath  CARDIOVASCULAR: No chest pain, no palpitations  GASTROINTESTINAL: No pain. No nausea or vomiting; No diarrhea   NEUROLOGICAL: No headache or numbness, no dizziness  MUSCULOSKELETAL: No joint pain, no muscle pain  GENITOURINARY: no dysuria, no frequency, no hematuria   PSYCHIATRY: no depression , no anxiety  ALL OTHER  ROS negative        Vital Signs Last 24 Hrs  T(C): 37 (28 Oct 2021 05:02), Max: 37.1 (27 Oct 2021 21:32)  T(F): 98.6 (28 Oct 2021 05:02), Max: 98.8 (27 Oct 2021 21:32)  HR: 65 (28 Oct 2021 13:07) (59 - 65)  BP: 134/63 (28 Oct 2021 13:07) (134/63 - 157/78)  BP(mean): --  RR: 18 (28 Oct 2021 13:07) (17 - 18)  SpO2: 98% (28 Oct 2021 13:07) (95% - 98%)    ________________________________________________  PHYSICAL EXAM:  GENERAL: NAD  HEENT: Normocephalic; atraumatic  CHEST/LUNG: Breathing nonlabored; Clear to auscultation bilaterally.   HEART: +S1 +S2  regular  ABDOMEN: Softly distended,  Nontender; Bowel sounds present  EXTREMITIES: +2 bilateral radial pulses. Trace bilateral  edema. LLE w/ C/D/I dressing  SKIN: warm and dry  NERVOUS SYSTEM:  Awake and alert; Oriented  to place, person and time ; no new deficits    _________________________________________________  LABS:                        7.3    7.65  )-----------( 395      ( 27 Oct 2021 07:09 )             21.9     10-27    140  |  110<H>  |  40<H>  ----------------------------<  181<H>  4.7   |  26  |  1.82<H>    Ca    8.7      27 Oct 2021 07:09  Phos  3.8     10-27  Mg     2.2     10-27      PT/INR - ( 27 Oct 2021 07:09 )   PT: 13.2 sec;   INR: 1.12 ratio         PTT - ( 27 Oct 2021 07:09 )  PTT:36.0 sec    CAPILLARY BLOOD GLUCOSE      POCT Blood Glucose.: 143 mg/dL (28 Oct 2021 17:06)  POCT Blood Glucose.: 154 mg/dL (28 Oct 2021 11:25)  POCT Blood Glucose.: 171 mg/dL (28 Oct 2021 08:02)        RADIOLOGY & ADDITIONAL TESTS:    Imaging Personally Reviewed:  YES/NO    Consultant(s) Notes Reviewed:   YES/ No    Care Discussed with Consultants :     Plan of care was discussed with patient and /or primary care giver; all questions and concerns were addressed and care was aligned with patient's wishes.     NP Note discussed with  Primary Attending; Dr Rodriguez     Patient is a 56y old  Male who presents with a chief complaint of Diabetic foot ulcer (28 Oct 2021 16:20)      INTERVAL HPI/OVERNIGHT EVENTS: Patient seen and examined earlier this am; no new complaints    MEDICATIONS  (STANDING):  amLODIPine   Tablet 10 milliGRAM(s) Oral daily  aspirin  chewable 81 milliGRAM(s) Oral daily  carvedilol 25 milliGRAM(s) Oral every 12 hours  ferrous    sulfate 325 milliGRAM(s) Oral daily  furosemide    Tablet 20 milliGRAM(s) Oral daily  heparin   Injectable 5000 Unit(s) SubCutaneous every 8 hours  insulin glargine Injectable (LANTUS) 10 Unit(s) SubCutaneous at bedtime  insulin lispro (ADMELOG) corrective regimen sliding scale   SubCutaneous three times a day before meals  insulin lispro (ADMELOG) corrective regimen sliding scale   SubCutaneous at bedtime  insulin lispro Injectable (ADMELOG) 3 Unit(s) SubCutaneous three times a day before meals  pantoprazole    Tablet 40 milliGRAM(s) Oral before breakfast  senna 2 Tablet(s) Oral at bedtime  sodium chloride 0.9%. 1000 milliLiter(s) (100 mL/Hr) IV Continuous <Continuous>  vancomycin  IVPB 1000 milliGRAM(s) IV Intermittent two times a day    MEDICATIONS  (PRN):      __________________________________________________  REVIEW OF SYSTEMS:    CONSTITUTIONAL: No fever,   RESPIRATORY: No cough; No shortness of breath  CARDIOVASCULAR: No chest pain, no palpitations  GASTROINTESTINAL: No pain. No nausea or vomiting; No diarrhea   NEUROLOGICAL: No headache or numbness, no dizziness  MUSCULOSKELETAL: No joint pain, no muscle pain  GENITOURINARY: no dysuria, no frequency, no hematuria   PSYCHIATRY: no depression , no anxiety  ALL OTHER  ROS negative        Vital Signs Last 24 Hrs  T(C): 37 (28 Oct 2021 05:02), Max: 37.1 (27 Oct 2021 21:32)  T(F): 98.6 (28 Oct 2021 05:02), Max: 98.8 (27 Oct 2021 21:32)  HR: 65 (28 Oct 2021 13:07) (59 - 65)  BP: 134/63 (28 Oct 2021 13:07) (134/63 - 157/78)  BP(mean): --  RR: 18 (28 Oct 2021 13:07) (17 - 18)  SpO2: 98% (28 Oct 2021 13:07) (95% - 98%)    ________________________________________________  PHYSICAL EXAM:  GENERAL: NAD  HEENT: Normocephalic; atraumatic  CHEST/LUNG: Breathing nonlabored; Clear to auscultation bilaterally.   HEART: +S1 +S2  regular  ABDOMEN: Softly distended,  Nontender; Bowel sounds present  EXTREMITIES: +2 bilateral radial pulses. Trace bilateral  edema. LLE w/ C/D/I dressing  SKIN: warm and dry  NERVOUS SYSTEM:  Awake and alert; Oriented  to place, person and time ; no new deficits    _________________________________________________  LABS:                        7.3    7.65  )-----------( 395      ( 27 Oct 2021 07:09 )             21.9     10-27    140  |  110<H>  |  40<H>  ----------------------------<  181<H>  4.7   |  26  |  1.82<H>    Ca    8.7      27 Oct 2021 07:09  Phos  3.8     10-27  Mg     2.2     10-27      PT/INR - ( 27 Oct 2021 07:09 )   PT: 13.2 sec;   INR: 1.12 ratio         PTT - ( 27 Oct 2021 07:09 )  PTT:36.0 sec    CAPILLARY BLOOD GLUCOSE      POCT Blood Glucose.: 143 mg/dL (28 Oct 2021 17:06)  POCT Blood Glucose.: 154 mg/dL (28 Oct 2021 11:25)  POCT Blood Glucose.: 171 mg/dL (28 Oct 2021 08:02)        RADIOLOGY & ADDITIONAL TESTS:    < from: MR Foot No Cont, Left (10.26.21 @ 11:09) >    EXAM:  MR FOOT LT                            PROCEDURE DATE:  10/26/2021          INTERPRETATION:  EXAMINATION: MRI of the left ankle/hindfoot without contrast    CLINICAL INFORMATION: Left foot ulcer. Evaluate for osteomyelitis.    TECHNIQUE: Multiplanar, multisequential MR imaging was performed.    FINDINGS: There is a cutaneous wound along the plantar aspect of the calcaneus with adjacent skin thickening and confluent subcutaneous soft tissue infiltration that may be secondary to cellulitis/phlegmon and/or ill-defined scar tissue. There is nonspecific high T2 signal throughout the adjacent lateral half of the calcaneus without associated bony destruction or confluent low T1 marrow signal. The findings are nonspecific and may be secondaryto stress reaction and/or osteomyelitis.    There is also edema signal throughout the talus talus, likely stress reaction, although, osteomyelitis cannot entirely excluded. There appears to be a small loose body in the anterior tibiotalar recess. Patient is status post transmetatarsal amputation of the fifth ray. There is a small to moderate-sized tibiotalar joint effusion. 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    There is diffuse fatty atrophy and high T2 signal of musculature, consistent with denervation. There is marked thickening and poor definition of the central cord of the plantar fascia with adjacent bony productive changes. There is also severe thickening/tendinosis of the noninsertional portion ofthe partially imaged Achilles tendon.    There is diffuse subcutaneous soft tissue edema.    IMPRESSION: Cutaneous wound along the plantar aspect of the calcaneus with adjacent confluent subcutaneous soft tissue infiltration that may be secondary to phlegmon/infection and/or scarring. Correlate clinically.  Marrow edema signal throughout the adjacent lateral aspect of the calcaneus may be secondary to stress reaction and/or osteomyelitis.  Edema in the talus is favored to be secondary to stress reaction.  Severe insertional Achilles tendinosis.  Marked thickening and poor definition of the central cord of plantar fascia with adjacent bony productive changes.    --- End of Report ---      SHANNAN SANTANA MD; Attending Radiologist  This document has been electronically signed. Oct 26 2021 12:01PM

## 2021-10-28 NOTE — PROGRESS NOTE ADULT - PROBLEM SELECTOR PLAN 1
MRI noted above  S/p bedside debridement and further  debridement in OR 10/27; Surgical pathology sent    + MRSA; ancef switched to vanco today  Blood cultures NGTD  Podiatry following  Dr. Love, ID, following MRI noted above  S/p bedside debridement and further  debridement in OR 10/27; Surgical pathology sent    NWB LLE   + MRSA; ancef switched to vanco today  Blood cultures NGTD  Podiatry following; wound vac to be applied 10/29   Dr. Love, ID, following

## 2021-10-28 NOTE — PROGRESS NOTE ADULT - PROBLEM SELECTOR PLAN 4
Likely SCAR. Total Iron 24. Hgb 7.3 (8.1 on admit). FOBT neg  No overt s/s bleeding  - Continue  Ferrous sulfate w/ stool softener  Transfuse if Hgb < 7  Continue to monitor CBC

## 2021-10-28 NOTE — CONSULT NOTE ADULT - SKIN COMMENTS
warmth ++ and darker discoloration of skin over left lower leg and foot ( visible portions), rest of foot dressed

## 2021-10-28 NOTE — PROGRESS NOTE ADULT - PROBLEM SELECTOR PLAN 5
A1C 8.5   Pt takes Tresiba and Humalog at home  Continue Lantus coverage QHS  Continue sliding scale insulin coverage  Continue glucose monitoring  Continue consistent  carb diet

## 2021-10-28 NOTE — CONSULT NOTE ADULT - GASTROINTESTINAL DETAILS
2 = assistive person soft/nontender/no distention/no masses palpable/bowel sounds normal/no guarding/no rigidity/no organomegaly

## 2021-10-28 NOTE — PROGRESS NOTE ADULT - ASSESSMENT
A:   Left diabetic infracalcaneal ulcer  Diabetes Type 2 with peripheral neuropathy    P:   pt seen and evaluated  s/p left foot wound debridement with graft application, bone biopsy (10/27)  Culture of the wound shows MRSA prelim  Bone culture reviewed - no growth   Post op dressing intact on the left foot - no strikethrough noted  Recommend elevation of the left leg   Pt to nonWB on left foot   Dispensed surgical shoe  Pod plan: Wound vac application tomorrow (10/29)  Pt needs ZACHARY placement  Continue abx per med  Will follow while in house  Discussed with attending Dr. Magana

## 2021-10-29 DIAGNOSIS — L03.90 CELLULITIS, UNSPECIFIED: ICD-10-CM

## 2021-10-29 LAB
ANION GAP SERPL CALC-SCNC: 5 MMOL/L — SIGNIFICANT CHANGE UP (ref 5–17)
BASOPHILS # BLD AUTO: 0.04 K/UL — SIGNIFICANT CHANGE UP (ref 0–0.2)
BASOPHILS NFR BLD AUTO: 0.6 % — SIGNIFICANT CHANGE UP (ref 0–2)
BUN SERPL-MCNC: 34 MG/DL — HIGH (ref 7–18)
CALCIUM SERPL-MCNC: 9.1 MG/DL — SIGNIFICANT CHANGE UP (ref 8.4–10.5)
CHLORIDE SERPL-SCNC: 109 MMOL/L — HIGH (ref 96–108)
CO2 SERPL-SCNC: 27 MMOL/L — SIGNIFICANT CHANGE UP (ref 22–31)
CREAT SERPL-MCNC: 1.73 MG/DL — HIGH (ref 0.5–1.3)
EOSINOPHIL # BLD AUTO: 0.28 K/UL — SIGNIFICANT CHANGE UP (ref 0–0.5)
EOSINOPHIL NFR BLD AUTO: 3.9 % — SIGNIFICANT CHANGE UP (ref 0–6)
GLUCOSE BLDC GLUCOMTR-MCNC: 136 MG/DL — HIGH (ref 70–99)
GLUCOSE BLDC GLUCOMTR-MCNC: 165 MG/DL — HIGH (ref 70–99)
GLUCOSE BLDC GLUCOMTR-MCNC: 189 MG/DL — HIGH (ref 70–99)
GLUCOSE BLDC GLUCOMTR-MCNC: 236 MG/DL — HIGH (ref 70–99)
GLUCOSE BLDC GLUCOMTR-MCNC: 98 MG/DL — SIGNIFICANT CHANGE UP (ref 70–99)
GLUCOSE SERPL-MCNC: 129 MG/DL — HIGH (ref 70–99)
HCT VFR BLD CALC: 23.9 % — LOW (ref 39–50)
HGB BLD-MCNC: 7.9 G/DL — LOW (ref 13–17)
IMM GRANULOCYTES NFR BLD AUTO: 0.3 % — SIGNIFICANT CHANGE UP (ref 0–1.5)
LYMPHOCYTES # BLD AUTO: 1.72 K/UL — SIGNIFICANT CHANGE UP (ref 1–3.3)
LYMPHOCYTES # BLD AUTO: 24.2 % — SIGNIFICANT CHANGE UP (ref 13–44)
MCHC RBC-ENTMCNC: 24.6 PG — LOW (ref 27–34)
MCHC RBC-ENTMCNC: 33.1 GM/DL — SIGNIFICANT CHANGE UP (ref 32–36)
MCV RBC AUTO: 74.5 FL — LOW (ref 80–100)
MONOCYTES # BLD AUTO: 0.62 K/UL — SIGNIFICANT CHANGE UP (ref 0–0.9)
MONOCYTES NFR BLD AUTO: 8.7 % — SIGNIFICANT CHANGE UP (ref 2–14)
NEUTROPHILS # BLD AUTO: 4.44 K/UL — SIGNIFICANT CHANGE UP (ref 1.8–7.4)
NEUTROPHILS NFR BLD AUTO: 62.3 % — SIGNIFICANT CHANGE UP (ref 43–77)
NRBC # BLD: 0 /100 WBCS — SIGNIFICANT CHANGE UP (ref 0–0)
PLATELET # BLD AUTO: 384 K/UL — SIGNIFICANT CHANGE UP (ref 150–400)
POTASSIUM SERPL-MCNC: 4.5 MMOL/L — SIGNIFICANT CHANGE UP (ref 3.5–5.3)
POTASSIUM SERPL-SCNC: 4.5 MMOL/L — SIGNIFICANT CHANGE UP (ref 3.5–5.3)
RBC # BLD: 3.21 M/UL — LOW (ref 4.2–5.8)
RBC # FLD: 15.9 % — HIGH (ref 10.3–14.5)
SODIUM SERPL-SCNC: 141 MMOL/L — SIGNIFICANT CHANGE UP (ref 135–145)
VANCOMYCIN TROUGH SERPL-MCNC: 15.2 UG/ML — SIGNIFICANT CHANGE UP (ref 10–20)
VANCOMYCIN TROUGH SERPL-MCNC: 17.4 UG/ML — SIGNIFICANT CHANGE UP (ref 10–20)
WBC # BLD: 7.12 K/UL — SIGNIFICANT CHANGE UP (ref 3.8–10.5)
WBC # FLD AUTO: 7.12 K/UL — SIGNIFICANT CHANGE UP (ref 3.8–10.5)

## 2021-10-29 RX ADMIN — Medication 3 UNIT(S): at 12:04

## 2021-10-29 RX ADMIN — Medication 2: at 12:04

## 2021-10-29 RX ADMIN — Medication 250 MILLIGRAM(S): at 10:15

## 2021-10-29 RX ADMIN — CARVEDILOL PHOSPHATE 25 MILLIGRAM(S): 80 CAPSULE, EXTENDED RELEASE ORAL at 05:48

## 2021-10-29 RX ADMIN — AMLODIPINE BESYLATE 10 MILLIGRAM(S): 2.5 TABLET ORAL at 05:48

## 2021-10-29 RX ADMIN — Medication 325 MILLIGRAM(S): at 12:04

## 2021-10-29 RX ADMIN — Medication 250 MILLIGRAM(S): at 22:15

## 2021-10-29 RX ADMIN — Medication 81 MILLIGRAM(S): at 12:05

## 2021-10-29 RX ADMIN — Medication 1: at 17:36

## 2021-10-29 RX ADMIN — Medication 3 UNIT(S): at 17:36

## 2021-10-29 RX ADMIN — Medication 3 UNIT(S): at 08:15

## 2021-10-29 RX ADMIN — HEPARIN SODIUM 5000 UNIT(S): 5000 INJECTION INTRAVENOUS; SUBCUTANEOUS at 21:27

## 2021-10-29 RX ADMIN — Medication 20 MILLIGRAM(S): at 05:48

## 2021-10-29 RX ADMIN — INSULIN GLARGINE 10 UNIT(S): 100 INJECTION, SOLUTION SUBCUTANEOUS at 22:15

## 2021-10-29 RX ADMIN — HEPARIN SODIUM 5000 UNIT(S): 5000 INJECTION INTRAVENOUS; SUBCUTANEOUS at 13:55

## 2021-10-29 RX ADMIN — CARVEDILOL PHOSPHATE 25 MILLIGRAM(S): 80 CAPSULE, EXTENDED RELEASE ORAL at 17:35

## 2021-10-29 RX ADMIN — HEPARIN SODIUM 5000 UNIT(S): 5000 INJECTION INTRAVENOUS; SUBCUTANEOUS at 05:49

## 2021-10-29 RX ADMIN — PANTOPRAZOLE SODIUM 40 MILLIGRAM(S): 20 TABLET, DELAYED RELEASE ORAL at 06:22

## 2021-10-29 NOTE — PROGRESS NOTE ADULT - ASSESSMENT
A:   s/p L heel wound debridement with somagen gratf application and bone biopsy 10/26/21  Left diabetic infracalcaneal ulcer  Diabetes Type 2 with peripheral neuropathy    P:   pt seen and evaluated  s/p left foot wound debridement with graft application, bone biopsy (10/27)  Culture of the wound shows MRSA prelim  Bone culture reviewed - no growth   Applied wound vac to L heel wound  Dressed with 4x4s, abd, sherrie, ACE  Pt stable from podiatry  Upon discharge please keep dressing clean, dry and intact  Pt to be nonwb to Left heel  Pt to change wound vac M/W/F with black foam and tegaderm  Apply 4x4 gauze, abd, sherrie, ACE to L foot  Recommend elevation of the left leg to help aid in post operative swelling   Recommend VNS   Appreciate ID consult  Follow up in outpatient podiatry clinic   Discussed with Dr. Magana and seen bedside with Dr. Forrester

## 2021-10-29 NOTE — PROGRESS NOTE ADULT - ASSESSMENT
Left foot and leg Cellulitis  R/O Osteomyelitis of left foot - s/p bone biopsy    Plan - Cont  vancomycin to 1gm iv q12hrs  monitor Vancomycin trough.  await bone biopsy results prior to getting a PICC line if needed.

## 2021-10-29 NOTE — PROGRESS NOTE ADULT - SUBJECTIVE AND OBJECTIVE BOX
56y Male    Meds:  vancomycin  IVPB 1000 milliGRAM(s) IV Intermittent two times a day    Allergies    No Known Drug Allergies  shellfish (Angioedema)  shellfish (Unknown)    Intolerances        VITALS:  Vital Signs Last 24 Hrs  T(C): 36.8 (30 Oct 2021 05:23), Max: 36.9 (29 Oct 2021 13:15)  T(F): 98.2 (30 Oct 2021 05:23), Max: 98.4 (29 Oct 2021 13:15)  HR: 61 (30 Oct 2021 05:23) (58 - 62)  BP: 129/63 (30 Oct 2021 05:23) (129/63 - 142/62)  BP(mean): --  RR: 18 (30 Oct 2021 05:23) (18 - 18)  SpO2: 98% (30 Oct 2021 05:23) (97% - 98%)    LABS/DIAGNOSTIC TESTS:                        CULTURES: .Tissue Other, Left calcaneus bone biopsy  10-27 @ 20:58   No growth  --    No polymorphonuclear cells seen per low power field  No organisms seen per oil power field      .Surgical Swab left foot wound  10-25 @ 20:57   Culture yields >4 types of aerobic and/or anaerobic bacteria  Call client services within 7 days if further workup is clinically  indicated. Culture includes  Few Methicillin Resistant Staphylococcus aureus  --  Methicillin resistant Staphylococcus aureus      .Blood Blood-Peripheral  10-25 @ 06:50   No Growth Final  --  --            RADIOLOGY:      ROS:  [  ] UNABLE TO ELICIT 56y Male with improvement in his left foot and leg swelling. No fevers or chills , no diarrhea. No other complaints.    Meds:  vancomycin  IVPB 1000 milliGRAM(s) IV Intermittent two times a day    Allergies    No Known Drug Allergies  shellfish (Angioedema)  shellfish (Unknown)    Intolerances        VITALS:  Vital Signs Last 24 Hrs  T(C):  Max: 36.9 (29 Oct 2021 13:15)  T(F):  Max: 98.4 (29 Oct 2021 13:15)  HR: 61  (58 - 62)  BP: 129/63  (129/63 - 142/62)      LABS/DIAGNOSTIC TESTS:                        CULTURES: .Tissue Other, Left calcaneus bone biopsy  10-27 @ 20:58   No growth  --    No polymorphonuclear cells seen per low power field  No organisms seen per oil power field      .Surgical Swab left foot wound  10-25 @ 20:57   Culture yields >4 types of aerobic and/or anaerobic bacteria  Call client services within 7 days if further workup is clinically  indicated. Culture includes  Few Methicillin Resistant Staphylococcus aureus  --  Methicillin resistant Staphylococcus aureus      .Blood Blood-Peripheral  10-25 @ 06:50   No Growth Final  --  --            RADIOLOGY:      ROS:  [  ] UNABLE TO ELICIT

## 2021-10-29 NOTE — PROGRESS NOTE ADULT - PROBLEM SELECTOR PLAN 2
- Continue Vancomycin - Continue Vancomycin  - Vanco trough prior to 4th dose  - Continue to monitor CBC, vitals

## 2021-10-29 NOTE — PROGRESS NOTE ADULT - SUBJECTIVE AND OBJECTIVE BOX
Patient is a 56y old  Male who presents with a chief complaint of Diabetic foot ulcer (25 Oct 2021 04:29)     Podiatry Interval HPI: Pt seen bedside resting comfortably s/p left foot wound debridement with graft application (10/27). Pt denies any pain to the foot.  States he is not ambulating to left foot. States he is waiting for rehab placement options. Denies constitutional symptoms. No other pedal complaints.     Podiatry HPI: 57 y/o diabetic male presents to the emergency department for a left heel wound. Patient states he was told to come to the ED by his podiatrist, Dr. Woodard. Patient states he has had the heel ulcer for 2 months. Patient states that the wound has progressively worsened in the last two months. Patient denies any pain to the wound. Patient states he has been following up with his podiatrist regularly for the past 8 months. Patient states he had a toe removed on his left foot several months ago. Patient admits he has diabetic neuropathy. Patient admits his recent blood sugar test was above 200. Patient denies smoking or drinking. Patient denies constitutional symptoms, denies trauma. No further pedal complaints.    HPI: Pt is a 49 y/o M with PMH of Diabetes (on insulin), Htn (says not on meds, but pharmacy states amlodipine, and carvedilol), anemia, obesity, PSH of Left foot 5th digit amputation, who presented to the ED with complaints of chronic left heel ulcer for 2-3 months. Patient states it has not been healing well. He has been seeing a podiatrist Dr. Ruffin, who sent him to the hospital for further evaluation. Pt says that he has sometimes seen some purple discharge from the wound, he denies any fevers or chills. He also denies any limited range of motion, difficulty with ambulation, pain at the site, or at the ankle joint. He denies any other complaints.     Upon further questioning after lab review. Patient endorses that his PCP told him to see a nephrologist for his kidney but he hadn't had the opportunity to go. He also knew about anemia and endorses some episodes of bleeding both painless BRBPR and black tarry stools around 8 months ago. Has never had a colonoscopy or endoscopy. Patient denies ever having a echocardiogram, or being told he has heart failure.  (25 Oct 2021 04:29)      Medications amLODIPine   Tablet 10 milliGRAM(s) Oral daily  aspirin  chewable 81 milliGRAM(s) Oral daily  carvedilol 25 milliGRAM(s) Oral every 12 hours  ferrous    sulfate 325 milliGRAM(s) Oral daily  furosemide    Tablet 20 milliGRAM(s) Oral daily  heparin   Injectable 5000 Unit(s) SubCutaneous every 8 hours  insulin glargine Injectable (LANTUS) 10 Unit(s) SubCutaneous at bedtime  insulin lispro (ADMELOG) corrective regimen sliding scale   SubCutaneous three times a day before meals  insulin lispro (ADMELOG) corrective regimen sliding scale   SubCutaneous at bedtime  insulin lispro Injectable (ADMELOG) 3 Unit(s) SubCutaneous three times a day before meals  pantoprazole    Tablet 40 milliGRAM(s) Oral before breakfast  senna 2 Tablet(s) Oral at bedtime  sodium chloride 0.9%. 1000 milliLiter(s) IV Continuous <Continuous>  vancomycin  IVPB 1000 milliGRAM(s) IV Intermittent two times a day    FHNo pertinent family history in first degree relatives    No pertinent family history in first degree relatives    ,   PMHDiabetes    No pertinent past medical history    Diabetes mellitus    Hypertension    Diabetic foot ulcer    Obesity       PSHNo significant past surgical history    History of amputation of toe        Labs                          7.9    7.12  )-----------( 384      ( 29 Oct 2021 07:18 )             23.9      10-29    141  |  109<H>  |  34<H>  ----------------------------<  129<H>  4.5   |  27  |  1.73<H>    Ca    9.1      29 Oct 2021 07:18       Vital Signs Last 24 Hrs  T(C): 37 (29 Oct 2021 05:03), Max: 37.2 (28 Oct 2021 20:49)  T(F): 98.6 (29 Oct 2021 05:03), Max: 98.9 (28 Oct 2021 20:49)  HR: 72 (29 Oct 2021 05:03) (54 - 72)  BP: 150/78 (29 Oct 2021 05:03) (122/46 - 150/78)  BP(mean): --  RR: 17 (29 Oct 2021 05:03) (17 - 18)  SpO2: 97% (29 Oct 2021 05:03) (94% - 98%)  Sedimentation Rate, Erythrocyte: 104 mm/Hr (10-25-21 @ 19:38)  Sedimentation Rate, Erythrocyte: 104 mm/Hr (10-25-21 @ 13:07)         C-Reactive Protein, Serum: 66 mg/L (10-26-21 @ 02:58)  C-Reactive Protein, Serum: 75 mg/L (10-26-21 @ 01:59)   WBC Count: 7.12 K/uL (10-29-21 @ 07:18)          PHYSICAL EXAM  GEN: MALU COURTNEY is a pleasant well-nourished, well developed 56y Male in no acute distress, alert awake, and oriented to person, place and time.   LE Focused:  Patient presents with a surgical shoe on his left foot and normal shoegear on the right. Patient is unassisted and unaccompanied. Patient is AOx3 and NAD.    Vasc:  DP pulses non-palpable b/l. PT pulses non-palpable b/l. Dopler exam revealed biphasic DP and PT pulses b/l. Skin temperature was warm to warm to the LLE and warm to cool to the RLE. Generalized non-pitting edema appreciated to the LLE. CFT <3 seconds to the b/l distal hallux.  Derm: Left infracalcaneal wound noted with somagen graft intact and well adhered with staples. Wound measured 6.0 cm x 5.0 cm x 1.0 cm. Malodor appreciated. No drainage. No tunneling. Fibrogranular wound base with normal periwound. Suture noted to L lateral calcaneus. No streaking or erythema noted to LLE. Cicatrix noted to left 5th ray. Fissure noted to left distal hallux and posterior heels b/l.  Neuro: Light touch sensation absent b/l. Protective sensation grossly diminished b/l.  MSK: Muscle strength 5/5 for all pedal muscle groups b/l. No significant symptomatic limitations upon pedal joint ROM exams b/l. Patient had excessive amount of ankle joint dorsiflexion b/l. Left 5th ray amputation noted.      Imaging:    EXAM:  MR FOOT LT                          PROCEDURE DATE:  10/26/2021      INTERPRETATION:  EXAMINATION: MRI of the left ankle/hindfoot without contrast    CLINICAL INFORMATION: Left foot ulcer. Evaluate for osteomyelitis.    TECHNIQUE: Multiplanar, multisequential MR imaging was performed.    FINDINGS: There is a cutaneous wound along the plantar aspect of the calcaneus with adjacent skin thickening and confluent subcutaneous soft tissue infiltration that may be secondary to cellulitis/phlegmon and/or ill-defined scar tissue. There is nonspecific high T2 signal throughout the adjacent lateral half of the calcaneus without associated bony destruction or confluent low T1 marrow signal. The findings are nonspecific and may be secondaryto stress reaction and/or osteomyelitis.    There is also edema signal throughout the talus talus, likely stress reaction, although, osteomyelitis cannot entirely excluded. There appears to be a small loose body in the anterior tibiotalar recess. Patient is status post transmetatarsal amputation of the fifth ray. There is a small to moderate-sized tibiotalar joint effusion. 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    There is diffuse fatty atrophy and high T2 signal of musculature, consistent with denervation. There is marked thickening and poor definition of the central cord of the plantar fascia with adjacent bony productive changes. There is also severe thickening/tendinosis of the noninsertional portion ofthe partially imaged Achilles tendon.    There is diffuse subcutaneous soft tissue edema.    IMPRESSION: Cutaneous wound along the plantar aspect of the calcaneus with adjacent confluent subcutaneous soft tissue infiltration that may be secondary to phlegmon/infection and/or scarring. Correlate clinically.  Marrow edema signal throughout the adjacent lateral aspect of the calcaneus may be secondary to stress reaction and/or osteomyelitis.  Edema in the talus is favored to be secondary to stress reaction.  Severe insertional Achilles tendinosis.  Marked thickening and poor definition of the central cord of plantar fascia with adjacent bony productive changes.      EXAM:  XR FOOT COMP MIN 3 VIEWS LT                          PROCEDURE DATE:  10/25/2021      INTERPRETATION:  LEFT foot    CLINICAL INFORMATION: LEFT heel ulcer. Assess osteomyelitis.    TECHNIQUE: AP,lateral and oblique views.    FINDINGS:  Large  heel soft tissue ulceration.. Adjacent calcaneus shows no gross osteolysis..  There is diffuse soft tissue swelling.  Status post fifth transmetatarsal amputation.  Degenerative arthrosis of the phalanges.    IMPRESSION:    Large heel soft tissue ulceration..  No radiographic evidence of osteomyelitis.    If osteomyelitis is considered  despite conservative therapy, and soft tissue / bone infection requires further assessment, follow-up MRI recommended.      Cultures:   Specimen Source: .Surgical Swab left foot wound (10.25.21 @ 20:57) Culture Results:   Culture yields >4 types of aerobic and/or anaerobic bacteria   Call client services within 7 days if further workup is clinically   indicated. Culture includes   Few Methicillin Resistant Staphylococcus aureus (10.25.21 @ 20:57)     Specimen Source: .Tissue Other, Left calcaneus bone biopsy (10.27.21 @ 20:58) Gram Stain:   No polymorphonuclear cells seen per low power field   No organisms seen per oil power field (10.27.21 @ 20:58)

## 2021-10-29 NOTE — CHART NOTE - NSCHARTNOTEFT_GEN_A_CORE
Pt is on Vanco. The 4th dose of vanco is due at 6AM so vanco trough was ordered before the 4th dose.

## 2021-10-29 NOTE — PROGRESS NOTE ADULT - ASSESSMENT
49 y/o M with Pmhx  of Diabetes, HTN (not on meds), obesity, PSH of Left foot 5th digit amputation, who presented to the ED with complaints of chronic left heel ulcer for 2-3 months. MRI revealed possible OM ; followed by Podiatry and s/p debridement 10/27 with bone biopsy and graft placement . Cultures positive for MRSA; started on vancomycin and ID consulted& following.

## 2021-10-29 NOTE — PROGRESS NOTE ADULT - SUBJECTIVE AND OBJECTIVE BOX
NP Note discussed with  Primary Attending    Patient is a 56y old  Male who presents with a chief complaint of Diabetic foot ulcer (29 Oct 2021 10:18)      INTERVAL HPI/OVERNIGHT EVENTS: no new complaints    MEDICATIONS  (STANDING):  amLODIPine   Tablet 10 milliGRAM(s) Oral daily  aspirin  chewable 81 milliGRAM(s) Oral daily  carvedilol 25 milliGRAM(s) Oral every 12 hours  ferrous    sulfate 325 milliGRAM(s) Oral daily  furosemide    Tablet 20 milliGRAM(s) Oral daily  heparin   Injectable 5000 Unit(s) SubCutaneous every 8 hours  insulin glargine Injectable (LANTUS) 10 Unit(s) SubCutaneous at bedtime  insulin lispro (ADMELOG) corrective regimen sliding scale   SubCutaneous three times a day before meals  insulin lispro (ADMELOG) corrective regimen sliding scale   SubCutaneous at bedtime  insulin lispro Injectable (ADMELOG) 3 Unit(s) SubCutaneous three times a day before meals  pantoprazole    Tablet 40 milliGRAM(s) Oral before breakfast  senna 2 Tablet(s) Oral at bedtime  sodium chloride 0.9%. 1000 milliLiter(s) (100 mL/Hr) IV Continuous <Continuous>  vancomycin  IVPB 1000 milliGRAM(s) IV Intermittent two times a day    MEDICATIONS  (PRN):      __________________________________________________  REVIEW OF SYSTEMS:    CONSTITUTIONAL: No fever,   EYES: no acute visual disturbances  NECK: No pain or stiffness  RESPIRATORY: No cough; No shortness of breath  CARDIOVASCULAR: No chest pain, no palpitations  GASTROINTESTINAL: No pain. No nausea or vomiting; No diarrhea   NEUROLOGICAL: No headache or numbness, no tremors  MUSCULOSKELETAL: No joint pain, no muscle pain  GENITOURINARY: no dysuria, no frequency, no hesitancy  PSYCHIATRY: no depression , no anxiety  ALL OTHER  ROS negative        Vital Signs Last 24 Hrs  T(C): 37 (29 Oct 2021 05:03), Max: 37.2 (28 Oct 2021 20:49)  T(F): 98.6 (29 Oct 2021 05:03), Max: 98.9 (28 Oct 2021 20:49)  HR: 72 (29 Oct 2021 05:03) (54 - 72)  BP: 150/78 (29 Oct 2021 05:03) (122/46 - 150/78)  BP(mean): --  RR: 17 (29 Oct 2021 05:03) (17 - 18)  SpO2: 97% (29 Oct 2021 05:03) (94% - 98%)    ________________________________________________  PHYSICAL EXAM:  GENERAL: NAD  HEENT: Normocephalic;  conjunctivae and sclerae clear; moist mucous membranes;   NECK : supple  CHEST/LUNG: Clear to auscultation bilaterally with good air entry   HEART: S1 S2  regular; no murmurs, gallops or rubs  ABDOMEN: Soft, Nontender, Nondistended; Bowel sounds present  EXTREMITIES: no cyanosis; no edema; no calf tenderness  SKIN: warm and dry; no rash  NERVOUS SYSTEM:  Awake and alert; Oriented  to place, person and time ; no new deficits    _________________________________________________  LABS:                        7.9    7.12  )-----------( 384      ( 29 Oct 2021 07:18 )             23.9     10-29    141  |  109<H>  |  34<H>  ----------------------------<  129<H>  4.5   |  27  |  1.73<H>    Ca    9.1      29 Oct 2021 07:18          CAPILLARY BLOOD GLUCOSE      POCT Blood Glucose.: 136 mg/dL (29 Oct 2021 07:53)  POCT Blood Glucose.: 125 mg/dL (28 Oct 2021 20:51)  POCT Blood Glucose.: 143 mg/dL (28 Oct 2021 17:06)        RADIOLOGY & ADDITIONAL TESTS:    Imaging Personally Reviewed:  YES/NO    Consultant(s) Notes Reviewed:   YES/ No    Care Discussed with Consultants :     Plan of care was discussed with patient and /or primary care giver; all questions and concerns were addressed and care was aligned with patient's wishes.     NP Note discussed with  Primary Attending; Dr Rodriguez     Patient is a 56y old  Male who presents with a chief complaint of Diabetic foot ulcer (29 Oct 2021 10:18)      INTERVAL HPI/OVERNIGHT EVENTS: Patient seen and evaluated earlier this am;  no new complaints    MEDICATIONS  (STANDING):  amLODIPine   Tablet 10 milliGRAM(s) Oral daily  aspirin  chewable 81 milliGRAM(s) Oral daily  carvedilol 25 milliGRAM(s) Oral every 12 hours  ferrous    sulfate 325 milliGRAM(s) Oral daily  furosemide    Tablet 20 milliGRAM(s) Oral daily  heparin   Injectable 5000 Unit(s) SubCutaneous every 8 hours  insulin glargine Injectable (LANTUS) 10 Unit(s) SubCutaneous at bedtime  insulin lispro (ADMELOG) corrective regimen sliding scale   SubCutaneous three times a day before meals  insulin lispro (ADMELOG) corrective regimen sliding scale   SubCutaneous at bedtime  insulin lispro Injectable (ADMELOG) 3 Unit(s) SubCutaneous three times a day before meals  pantoprazole    Tablet 40 milliGRAM(s) Oral before breakfast  senna 2 Tablet(s) Oral at bedtime  sodium chloride 0.9%. 1000 milliLiter(s) (100 mL/Hr) IV Continuous <Continuous>  vancomycin  IVPB 1000 milliGRAM(s) IV Intermittent two times a day    MEDICATIONS  (PRN):      __________________________________________________  REVIEW OF SYSTEMS:    CONSTITUTIONAL: No fever,   RESPIRATORY: No cough; No shortness of breath  CARDIOVASCULAR: No chest pain, no palpitations  GASTROINTESTINAL: No pain. No nausea or vomiting; No diarrhea   NEUROLOGICAL: No headache or numbness, no dizziness  MUSCULOSKELETAL: No joint pain, no muscle pain  GENITOURINARY: no dysuria, no frequency, no hematuria   PSYCHIATRY: no depression , no anxiety  ALL OTHER  ROS negative        Vital Signs Last 24 Hrs  T(C): 37 (29 Oct 2021 05:03), Max: 37.2 (28 Oct 2021 20:49)  T(F): 98.6 (29 Oct 2021 05:03), Max: 98.9 (28 Oct 2021 20:49)  HR: 72 (29 Oct 2021 05:03) (54 - 72)  BP: 150/78 (29 Oct 2021 05:03) (122/46 - 150/78)  BP(mean): --  RR: 17 (29 Oct 2021 05:03) (17 - 18)  SpO2: 97% (29 Oct 2021 05:03) (94% - 98%)    ________________________________________________  PHYSICAL EXAM:  GENERAL: NAD  HEENT: Normocephalic;  atraumatic   CHEST/LUNG: Breathing nonlabored; clear to auscultation bilaterally.  HEART: +S1 +S2  regular  ABDOMEN: Soft, Nontender, Nondistended; Bowel sounds present  EXTREMITIES: +2 bilateral radial pulses;  left foot dressing C/D/I ( wound vac not yet placed at time of assessment)   SKIN: warm and dry. + LLE hyperpigmentation   NERVOUS SYSTEM:  Awake and alert; Oriented  to place, person and time ; no new deficits    _________________________________________________  LABS:                        7.9    7.12  )-----------( 384      ( 29 Oct 2021 07:18 )             23.9     10-29    141  |  109<H>  |  34<H>  ----------------------------<  129<H>  4.5   |  27  |  1.73<H>    Ca    9.1      29 Oct 2021 07:18          CAPILLARY BLOOD GLUCOSE      POCT Blood Glucose.: 136 mg/dL (29 Oct 2021 07:53)  POCT Blood Glucose.: 125 mg/dL (28 Oct 2021 20:51)  POCT Blood Glucose.: 143 mg/dL (28 Oct 2021 17:06)        RADIOLOGY & ADDITIONAL TESTS:      Gram Stain:   No polymorphonuclear cells seen per low power field  No organisms seen per oil power field (10.27.21 @ 20:58)    Culture - Surgical Swab (10.25.21 @ 20:57)    -  Ampicillin/Sulbactam: R 16/8    -  Cefazolin: R 8    -  Clindamycin: S <=0.25    -  Daptomycin: S 1    -  Erythromycin: R >4    -  Gentamicin: S 4 Should not be used as monotherapy    -  Linezolid: S 4    -  Oxacillin: R >2    -  Penicillin: R >8    -  RIF- Rifampin: S <=1 Should not be used as monotherapy    -  Tetra/Doxy: S <=1    -  Trimethoprim/Sulfamethoxazole: S <=0.5/9.5    -  Vancomycin: S 2    Specimen Source: .Surgical Swab left foot wound    Culture Results:   Culture yields >4 types of aerobic and/or anaerobic bacteria  Call client services within 7 days if further workup is clinically  indicated. Culture includes  Few Methicillin Resistant Staphylococcus aureus    Organism Identification: Methicillin resistant Staphylococcus aureus    Organism: Methicillin resistant Staphylococcus aureus    Method Type: FORD      < from: MR Foot No Cont, Left (10.26.21 @ 11:09) >  EXAM:  MR FOOT LT                            PROCEDURE DATE:  10/26/2021          INTERPRETATION:  EXAMINATION: MRI of the left ankle/hindfoot without contrast    CLINICAL INFORMATION: Left foot ulcer. Evaluate for osteomyelitis.    TECHNIQUE: Multiplanar, multisequential MR imaging was performed.    FINDINGS: There is a cutaneous wound along the plantar aspect of the calcaneus with adjacent skin thickening and confluent subcutaneous soft tissue infiltration that may be secondary to cellulitis/phlegmon and/or ill-defined scar tissue. There is nonspecific high T2 signal throughout the adjacent lateral half of the calcaneus without associated bony destruction or confluent low T1 marrow signal. The findings are nonspecific and may be secondaryto stress reaction and/or osteomyelitis.    There is also edema signal throughout the talus talus, likely stress reaction, although, osteomyelitis cannot entirely excluded. There appears to be a small loose body in the anterior tibiotalar recess. Patient is status post transmetatarsal amputation of the fifth ray. There is a small to moderate-sized tibiotalar joint effusion. 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    There is diffuse fatty atrophy and high T2 signal of musculature, consistent with denervation. There is marked thickening and poor definition of the central cord of the plantar fascia with adjacent bony productive changes. There is also severe thickening/tendinosis of the noninsertional portion ofthe partially imaged Achilles tendon.    There is diffuse subcutaneous soft tissue edema.    IMPRESSION: Cutaneous wound along the plantar aspect of the calcaneus with adjacent confluent subcutaneous soft tissue infiltration that may be secondary to phlegmon/infection and/or scarring. Correlate clinically.  Marrow edema signal throughout the adjacent lateral aspect of the calcaneus may be secondary to stress reaction and/or osteomyelitis.  Edema in the talus is favored to be secondary to stress reaction.  Severe insertional Achilles tendinosis.  Marked thickening and poor definition of the central cord of plantar fascia with adjacent bony productive changes.    --- End of Report ---            SHANNAN SANTANA MD; Attending Radiologist  This document has been electronically signed. Oct 26 2021 12:01PM    < end of copied text >

## 2021-10-29 NOTE — PROGRESS NOTE ADULT - PROBLEM SELECTOR PLAN 4
CARRIE vs likely CARRIE on CKD ( baseline appears to be 1.3-1.5 from previous admission).  Scr 1.9 on admission;  - SCr 1.7 today   - Lasix held for now; resume when feasible/ when at baseline   - Avoid nephrotoxins  - Continue to monitor BMP

## 2021-10-29 NOTE — PROGRESS NOTE ADULT - PROBLEM SELECTOR PLAN 1
MRI revealed possible OM; awaiting bone biopsy for definitive diagnosis  -S/p bedside debridement and further  debridement in OR 10/27; Surgical pathology sent    NWB LLE   + MRSA; ancef switched to vanco 1 gm Q12 10/28   -Blood cultures NGTD  - Monitor CBC, BMP   -Podiatry following; wound vac to be applied today   -Dr. Love, ID, following

## 2021-10-30 LAB
ALBUMIN SERPL ELPH-MCNC: 1.9 G/DL — LOW (ref 3.5–5)
ALP SERPL-CCNC: 81 U/L — SIGNIFICANT CHANGE UP (ref 40–120)
ALT FLD-CCNC: 21 U/L DA — SIGNIFICANT CHANGE UP (ref 10–60)
ANION GAP SERPL CALC-SCNC: 6 MMOL/L — SIGNIFICANT CHANGE UP (ref 5–17)
AST SERPL-CCNC: 31 U/L — SIGNIFICANT CHANGE UP (ref 10–40)
BASOPHILS # BLD AUTO: 0.05 K/UL — SIGNIFICANT CHANGE UP (ref 0–0.2)
BASOPHILS NFR BLD AUTO: 0.7 % — SIGNIFICANT CHANGE UP (ref 0–2)
BILIRUB SERPL-MCNC: 0.2 MG/DL — SIGNIFICANT CHANGE UP (ref 0.2–1.2)
BUN SERPL-MCNC: 31 MG/DL — HIGH (ref 7–18)
CALCIUM SERPL-MCNC: 9 MG/DL — SIGNIFICANT CHANGE UP (ref 8.4–10.5)
CHLORIDE SERPL-SCNC: 109 MMOL/L — HIGH (ref 96–108)
CO2 SERPL-SCNC: 25 MMOL/L — SIGNIFICANT CHANGE UP (ref 22–31)
CREAT SERPL-MCNC: 1.68 MG/DL — HIGH (ref 0.5–1.3)
CULTURE RESULTS: SIGNIFICANT CHANGE UP
CULTURE RESULTS: SIGNIFICANT CHANGE UP
EOSINOPHIL # BLD AUTO: 0.24 K/UL — SIGNIFICANT CHANGE UP (ref 0–0.5)
EOSINOPHIL NFR BLD AUTO: 3.4 % — SIGNIFICANT CHANGE UP (ref 0–6)
GLUCOSE BLDC GLUCOMTR-MCNC: 140 MG/DL — HIGH (ref 70–99)
GLUCOSE BLDC GLUCOMTR-MCNC: 163 MG/DL — HIGH (ref 70–99)
GLUCOSE BLDC GLUCOMTR-MCNC: 177 MG/DL — HIGH (ref 70–99)
GLUCOSE BLDC GLUCOMTR-MCNC: 178 MG/DL — HIGH (ref 70–99)
GLUCOSE SERPL-MCNC: 150 MG/DL — HIGH (ref 70–99)
HCT VFR BLD CALC: 24.7 % — LOW (ref 39–50)
HGB BLD-MCNC: 8.3 G/DL — LOW (ref 13–17)
IMM GRANULOCYTES NFR BLD AUTO: 0.4 % — SIGNIFICANT CHANGE UP (ref 0–1.5)
LYMPHOCYTES # BLD AUTO: 1.9 K/UL — SIGNIFICANT CHANGE UP (ref 1–3.3)
LYMPHOCYTES # BLD AUTO: 26.8 % — SIGNIFICANT CHANGE UP (ref 13–44)
MCHC RBC-ENTMCNC: 24.8 PG — LOW (ref 27–34)
MCHC RBC-ENTMCNC: 33.6 GM/DL — SIGNIFICANT CHANGE UP (ref 32–36)
MCV RBC AUTO: 73.7 FL — LOW (ref 80–100)
MONOCYTES # BLD AUTO: 0.58 K/UL — SIGNIFICANT CHANGE UP (ref 0–0.9)
MONOCYTES NFR BLD AUTO: 8.2 % — SIGNIFICANT CHANGE UP (ref 2–14)
NEUTROPHILS # BLD AUTO: 4.29 K/UL — SIGNIFICANT CHANGE UP (ref 1.8–7.4)
NEUTROPHILS NFR BLD AUTO: 60.5 % — SIGNIFICANT CHANGE UP (ref 43–77)
NRBC # BLD: 0 /100 WBCS — SIGNIFICANT CHANGE UP (ref 0–0)
PLATELET # BLD AUTO: 390 K/UL — SIGNIFICANT CHANGE UP (ref 150–400)
POTASSIUM SERPL-MCNC: 4.3 MMOL/L — SIGNIFICANT CHANGE UP (ref 3.5–5.3)
POTASSIUM SERPL-SCNC: 4.3 MMOL/L — SIGNIFICANT CHANGE UP (ref 3.5–5.3)
PROT SERPL-MCNC: 7.9 G/DL — SIGNIFICANT CHANGE UP (ref 6–8.3)
RBC # BLD: 3.35 M/UL — LOW (ref 4.2–5.8)
RBC # FLD: 15.8 % — HIGH (ref 10.3–14.5)
SODIUM SERPL-SCNC: 140 MMOL/L — SIGNIFICANT CHANGE UP (ref 135–145)
SPECIMEN SOURCE: SIGNIFICANT CHANGE UP
SPECIMEN SOURCE: SIGNIFICANT CHANGE UP
WBC # BLD: 7.09 K/UL — SIGNIFICANT CHANGE UP (ref 3.8–10.5)
WBC # FLD AUTO: 7.09 K/UL — SIGNIFICANT CHANGE UP (ref 3.8–10.5)

## 2021-10-30 RX ADMIN — CARVEDILOL PHOSPHATE 25 MILLIGRAM(S): 80 CAPSULE, EXTENDED RELEASE ORAL at 05:34

## 2021-10-30 RX ADMIN — Medication 1: at 12:15

## 2021-10-30 RX ADMIN — HEPARIN SODIUM 5000 UNIT(S): 5000 INJECTION INTRAVENOUS; SUBCUTANEOUS at 05:35

## 2021-10-30 RX ADMIN — Medication 3 UNIT(S): at 12:15

## 2021-10-30 RX ADMIN — INSULIN GLARGINE 10 UNIT(S): 100 INJECTION, SOLUTION SUBCUTANEOUS at 22:21

## 2021-10-30 RX ADMIN — Medication 3 UNIT(S): at 17:22

## 2021-10-30 RX ADMIN — AMLODIPINE BESYLATE 10 MILLIGRAM(S): 2.5 TABLET ORAL at 05:35

## 2021-10-30 RX ADMIN — HEPARIN SODIUM 5000 UNIT(S): 5000 INJECTION INTRAVENOUS; SUBCUTANEOUS at 14:00

## 2021-10-30 RX ADMIN — Medication 250 MILLIGRAM(S): at 17:21

## 2021-10-30 RX ADMIN — HEPARIN SODIUM 5000 UNIT(S): 5000 INJECTION INTRAVENOUS; SUBCUTANEOUS at 22:21

## 2021-10-30 RX ADMIN — Medication 1: at 17:22

## 2021-10-30 RX ADMIN — Medication 81 MILLIGRAM(S): at 12:15

## 2021-10-30 RX ADMIN — Medication 325 MILLIGRAM(S): at 12:15

## 2021-10-30 RX ADMIN — Medication 250 MILLIGRAM(S): at 05:34

## 2021-10-30 RX ADMIN — CARVEDILOL PHOSPHATE 25 MILLIGRAM(S): 80 CAPSULE, EXTENDED RELEASE ORAL at 17:23

## 2021-10-30 RX ADMIN — Medication 3 UNIT(S): at 08:02

## 2021-10-30 RX ADMIN — PANTOPRAZOLE SODIUM 40 MILLIGRAM(S): 20 TABLET, DELAYED RELEASE ORAL at 05:35

## 2021-10-30 NOTE — PROGRESS NOTE ADULT - SUBJECTIVE AND OBJECTIVE BOX
Patient is a 56y old  Male who presents with a chief complaint of Diabetic foot ulcer (29 Oct 2021 10:18)      INTERVAL HPI/OVERNIGHT EVENTS: Patient seen and evaluated earlier this am;  no new complaints    MEDICATIONS  (STANDING):  amLODIPine   Tablet 10 milliGRAM(s) Oral daily  aspirin  chewable 81 milliGRAM(s) Oral daily  carvedilol 25 milliGRAM(s) Oral every 12 hours  ferrous    sulfate 325 milliGRAM(s) Oral daily  furosemide    Tablet 20 milliGRAM(s) Oral daily  heparin   Injectable 5000 Unit(s) SubCutaneous every 8 hours  insulin glargine Injectable (LANTUS) 10 Unit(s) SubCutaneous at bedtime  insulin lispro (ADMELOG) corrective regimen sliding scale   SubCutaneous three times a day before meals  insulin lispro (ADMELOG) corrective regimen sliding scale   SubCutaneous at bedtime  insulin lispro Injectable (ADMELOG) 3 Unit(s) SubCutaneous three times a day before meals  pantoprazole    Tablet 40 milliGRAM(s) Oral before breakfast  senna 2 Tablet(s) Oral at bedtime  sodium chloride 0.9%. 1000 milliLiter(s) (100 mL/Hr) IV Continuous <Continuous>  vancomycin  IVPB 1000 milliGRAM(s) IV Intermittent two times a day    MEDICATIONS  (PRN):      __________________________________________________  REVIEW OF SYSTEMS:    CONSTITUTIONAL: No fever,   RESPIRATORY: No cough; No shortness of breath  CARDIOVASCULAR: No chest pain, no palpitations  GASTROINTESTINAL: No pain. No nausea or vomiting; No diarrhea   NEUROLOGICAL: No headache or numbness, no dizziness  MUSCULOSKELETAL: No joint pain, no muscle pain  GENITOURINARY: no dysuria, no frequency, no hematuria   PSYCHIATRY: no depression , no anxiety  ALL OTHER  ROS negative        Vital Signs Last 24 Hrs  T(C): 37 (29 Oct 2021 05:03), Max: 37.2 (28 Oct 2021 20:49)  T(F): 98.6 (29 Oct 2021 05:03), Max: 98.9 (28 Oct 2021 20:49)  HR: 72 (29 Oct 2021 05:03) (54 - 72)  BP: 150/78 (29 Oct 2021 05:03) (122/46 - 150/78)  BP(mean): --  RR: 17 (29 Oct 2021 05:03) (17 - 18)  SpO2: 97% (29 Oct 2021 05:03) (94% - 98%)    ________________________________________________  PHYSICAL EXAM:  GENERAL: NAD  HEENT: Normocephalic;  atraumatic   CHEST/LUNG: Breathing nonlabored; clear to auscultation bilaterally.  HEART: +S1 +S2  regular  ABDOMEN: Soft, Nontender, Nondistended; Bowel sounds present  EXTREMITIES: +2 bilateral radial pulses;  left foot dressing C/D/I ( wound vac not yet placed at time of assessment)   SKIN: warm and dry. + LLE hyperpigmentation   NERVOUS SYSTEM:  Awake and alert; Oriented  to place, person and time ; no new deficits    _________________________________________________  LABS:                        7.9    7.12  )-----------( 384      ( 29 Oct 2021 07:18 )             23.9     10-29    141  |  109<H>  |  34<H>  ----------------------------<  129<H>  4.5   |  27  |  1.73<H>    Ca    9.1      29 Oct 2021 07:18          CAPILLARY BLOOD GLUCOSE      POCT Blood Glucose.: 136 mg/dL (29 Oct 2021 07:53)  POCT Blood Glucose.: 125 mg/dL (28 Oct 2021 20:51)  POCT Blood Glucose.: 143 mg/dL (28 Oct 2021 17:06)        RADIOLOGY & ADDITIONAL TESTS:      Gram Stain:   No polymorphonuclear cells seen per low power field  No organisms seen per oil power field (10.27.21 @ 20:58)    Culture - Surgical Swab (10.25.21 @ 20:57)    -  Ampicillin/Sulbactam: R 16/8    -  Cefazolin: R 8    -  Clindamycin: S <=0.25    -  Daptomycin: S 1    -  Erythromycin: R >4    -  Gentamicin: S 4 Should not be used as monotherapy    -  Linezolid: S 4    -  Oxacillin: R >2    -  Penicillin: R >8    -  RIF- Rifampin: S <=1 Should not be used as monotherapy    -  Tetra/Doxy: S <=1    -  Trimethoprim/Sulfamethoxazole: S <=0.5/9.5    -  Vancomycin: S 2    Specimen Source: .Surgical Swab left foot wound    Culture Results:   Culture yields >4 types of aerobic and/or anaerobic bacteria  Call client services within 7 days if further workup is clinically  indicated. Culture includes  Few Methicillin Resistant Staphylococcus aureus    Organism Identification: Methicillin resistant Staphylococcus aureus    Organism: Methicillin resistant Staphylococcus aureus    Method Type: FORD      < from: MR Foot No Cont, Left (10.26.21 @ 11:09) >  EXAM:  MR FOOT LT                            PROCEDURE DATE:  10/26/2021          INTERPRETATION:  EXAMINATION: MRI of the left ankle/hindfoot without contrast    CLINICAL INFORMATION: Left foot ulcer. Evaluate for osteomyelitis.    TECHNIQUE: Multiplanar, multisequential MR imaging was performed.    FINDINGS: There is a cutaneous wound along the plantar aspect of the calcaneus with adjacent skin thickening and confluent subcutaneous soft tissue infiltration that may be secondary to cellulitis/phlegmon and/or ill-defined scar tissue. There is nonspecific high T2 signal throughout the adjacent lateral half of the calcaneus without associated bony destruction or confluent low T1 marrow signal. The findings are nonspecific and may be secondaryto stress reaction and/or osteomyelitis.    There is also edema signal throughout the talus talus, likely stress reaction, although, osteomyelitis cannot entirely excluded. There appears to be a small loose body in the anterior tibiotalar recess. Patient is status post transmetatarsal amputation of the fifth ray. There is a small to moderate-sized tibiotalar joint effusion. 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    There is diffuse fatty atrophy and high T2 signal of musculature, consistent with denervation. There is marked thickening and poor definition of the central cord of the plantar fascia with adjacent bony productive changes. There is also severe thickening/tendinosis of the noninsertional portion ofthe partially imaged Achilles tendon.    There is diffuse subcutaneous soft tissue edema.    IMPRESSION: Cutaneous wound along the plantar aspect of the calcaneus with adjacent confluent subcutaneous soft tissue infiltration that may be secondary to phlegmon/infection and/or scarring. Correlate clinically.  Marrow edema signal throughout the adjacent lateral aspect of the calcaneus may be secondary to stress reaction and/or osteomyelitis.  Edema in the talus is favored to be secondary to stress reaction.  Severe insertional Achilles tendinosis.  Marked thickening and poor definition of the central cord of plantar fascia with adjacent bony productive changes.    --- End of Report ---            SHANNAN SANTANA MD; Attending Radiologist  This document has been electronically signed. Oct 26 2021 12:01PM    < end of copied text >

## 2021-10-30 NOTE — PROGRESS NOTE ADULT - SUBJECTIVE AND OBJECTIVE BOX
56y Male    Meds:  vancomycin  IVPB 1000 milliGRAM(s) IV Intermittent two times a day    Allergies    No Known Drug Allergies  shellfish (Angioedema)  shellfish (Unknown)    Intolerances        VITALS:  Vital Signs Last 24 Hrs  T(C): 36.9 (30 Oct 2021 13:27), Max: 36.9 (29 Oct 2021 20:40)  T(F): 98.5 (30 Oct 2021 13:27), Max: 98.5 (30 Oct 2021 13:27)  HR: 59 (30 Oct 2021 13:27) (58 - 62)  BP: 123/58 (30 Oct 2021 13:27) (123/58 - 142/62)  BP(mean): --  RR: 18 (30 Oct 2021 13:27) (18 - 18)  SpO2: 98% (30 Oct 2021 13:27) (97% - 98%)    LABS/DIAGNOSTIC TESTS:                          8.3    7.09  )-----------( 390      ( 30 Oct 2021 07:10 )             24.7         10-30    140  |  109<H>  |  31<H>  ----------------------------<  150<H>  4.3   |  25  |  1.68<H>    Ca    9.0      30 Oct 2021 07:10    TPro  7.9  /  Alb  1.9<L>  /  TBili  0.2  /  DBili  x   /  AST  31  /  ALT  21  /  AlkPhos  81  10-30      LIVER FUNCTIONS - ( 30 Oct 2021 07:10 )  Alb: 1.9 g/dL / Pro: 7.9 g/dL / ALK PHOS: 81 U/L / ALT: 21 U/L DA / AST: 31 U/L / GGT: x             CULTURES: .Tissue Other, Left calcaneus bone biopsy  10-27 @ 20:58   No growth  --    No polymorphonuclear cells seen per low power field  No organisms seen per oil power field      .Surgical Swab left foot wound  10-25 @ 20:57   Culture yields >4 types of aerobic and/or anaerobic bacteria  Call client services within 7 days if further workup is clinically  indicated. Culture includes  Few Methicillin Resistant Staphylococcus aureus  --  Methicillin resistant Staphylococcus aureus      .Blood Blood-Peripheral  10-25 @ 06:50   No Growth Final  --  --            RADIOLOGY:      ROS:  [  ] UNABLE TO ELICIT 56y Male who is doing better, he has no new complaints, no fevers or chills, no diarrhea.     Meds:  vancomycin  IVPB 1000 milliGRAM(s) IV Intermittent two times a day    Allergies    No Known Drug Allergies  shellfish (Angioedema)  shellfish (Unknown)    Intolerances        VITALS:  Vital Signs Last 24 Hrs  T(C): 36.9 (30 Oct 2021 13:27), Max: 36.9 (29 Oct 2021 20:40)  T(F): 98.5 (30 Oct 2021 13:27), Max: 98.5 (30 Oct 2021 13:27)  HR: 59 (30 Oct 2021 13:27) (58 - 62)  BP: 123/58 (30 Oct 2021 13:27) (123/58 - 142/62)  BP(mean): --  RR: 18 (30 Oct 2021 13:27) (18 - 18)  SpO2: 98% (30 Oct 2021 13:27) (97% - 98%)    LABS/DIAGNOSTIC TESTS:  Vancomycin Level, Trough (10.29.21 @ 21:00)   Vancomycin Level, Trough: 17.4:                          8.3    7.09  )-----------( 390      ( 30 Oct 2021 07:10 )             24.7         10-30    140  |  109<H>  |  31<H>  ----------------------------<  150<H>  4.3   |  25  |  1.68<H>    Ca    9.0      30 Oct 2021 07:10    TPro  7.9  /  Alb  1.9<L>  /  TBili  0.2  /  DBili  x   /  AST  31  /  ALT  21  /  AlkPhos  81  10-30      LIVER FUNCTIONS - ( 30 Oct 2021 07:10 )  Alb: 1.9 g/dL / Pro: 7.9 g/dL / ALK PHOS: 81 U/L / ALT: 21 U/L DA / AST: 31 U/L / GGT: x             CULTURES: .Tissue Other, Left calcaneus bone biopsy  10-27 @ 20:58   No growth  --    No polymorphonuclear cells seen per low power field  No organisms seen per oil power field      .Surgical Swab left foot wound  10-25 @ 20:57   Culture yields >4 types of aerobic and/or anaerobic bacteria  Call client services within 7 days if further workup is clinically  indicated. Culture includes  Few Methicillin Resistant Staphylococcus aureus  --  Methicillin resistant Staphylococcus aureus      .Blood Blood-Peripheral  10-25 @ 06:50   No Growth Final  --  --            RADIOLOGY:      ROS:  [  ] UNABLE TO ELICIT

## 2021-10-30 NOTE — PROGRESS NOTE ADULT - ASSESSMENT
Left foot and leg Cellulitis - improving  R/O Osteomyelitis of left foot - s/p bone biopsy    Plan - Cont  vancomycin to 1gm iv q12hrs  monitor Vancomycin trough.  await bone biopsy results prior to getting a PICC line if needed.

## 2021-10-30 NOTE — PROGRESS NOTE ADULT - ASSESSMENT
51 y/o M with Pmhx  of Diabetes, HTN (not on meds), obesity, PSH of Left foot 5th digit amputation, who presented to the ED with complaints of chronic left heel ulcer for 2-3 months. MRI revealed possible OM ; followed by Podiatry and s/p debridement 10/27 with bone biopsy and graft placement . Cultures positive for MRSA; started on vancomycin and ID consulted& following.

## 2021-10-31 ENCOUNTER — TRANSCRIPTION ENCOUNTER (OUTPATIENT)
Age: 56
End: 2021-10-31

## 2021-10-31 LAB
ALBUMIN SERPL ELPH-MCNC: 1.9 G/DL — LOW (ref 3.5–5)
ALP SERPL-CCNC: 84 U/L — SIGNIFICANT CHANGE UP (ref 40–120)
ALT FLD-CCNC: 29 U/L DA — SIGNIFICANT CHANGE UP (ref 10–60)
ANION GAP SERPL CALC-SCNC: 6 MMOL/L — SIGNIFICANT CHANGE UP (ref 5–17)
AST SERPL-CCNC: 35 U/L — SIGNIFICANT CHANGE UP (ref 10–40)
BASOPHILS # BLD AUTO: 0.05 K/UL — SIGNIFICANT CHANGE UP (ref 0–0.2)
BASOPHILS NFR BLD AUTO: 0.6 % — SIGNIFICANT CHANGE UP (ref 0–2)
BILIRUB SERPL-MCNC: 0.2 MG/DL — SIGNIFICANT CHANGE UP (ref 0.2–1.2)
BUN SERPL-MCNC: 38 MG/DL — HIGH (ref 7–18)
CALCIUM SERPL-MCNC: 8.7 MG/DL — SIGNIFICANT CHANGE UP (ref 8.4–10.5)
CHLORIDE SERPL-SCNC: 107 MMOL/L — SIGNIFICANT CHANGE UP (ref 96–108)
CO2 SERPL-SCNC: 27 MMOL/L — SIGNIFICANT CHANGE UP (ref 22–31)
CREAT SERPL-MCNC: 1.93 MG/DL — HIGH (ref 0.5–1.3)
EOSINOPHIL # BLD AUTO: 0.28 K/UL — SIGNIFICANT CHANGE UP (ref 0–0.5)
EOSINOPHIL NFR BLD AUTO: 3.2 % — SIGNIFICANT CHANGE UP (ref 0–6)
GLUCOSE BLDC GLUCOMTR-MCNC: 140 MG/DL — HIGH (ref 70–99)
GLUCOSE BLDC GLUCOMTR-MCNC: 146 MG/DL — HIGH (ref 70–99)
GLUCOSE BLDC GLUCOMTR-MCNC: 186 MG/DL — HIGH (ref 70–99)
GLUCOSE BLDC GLUCOMTR-MCNC: 214 MG/DL — HIGH (ref 70–99)
GLUCOSE SERPL-MCNC: 184 MG/DL — HIGH (ref 70–99)
HCT VFR BLD CALC: 23.2 % — LOW (ref 39–50)
HGB BLD-MCNC: 7.8 G/DL — LOW (ref 13–17)
IMM GRANULOCYTES NFR BLD AUTO: 0.3 % — SIGNIFICANT CHANGE UP (ref 0–1.5)
LYMPHOCYTES # BLD AUTO: 2.18 K/UL — SIGNIFICANT CHANGE UP (ref 1–3.3)
LYMPHOCYTES # BLD AUTO: 25.2 % — SIGNIFICANT CHANGE UP (ref 13–44)
MCHC RBC-ENTMCNC: 24.8 PG — LOW (ref 27–34)
MCHC RBC-ENTMCNC: 33.6 GM/DL — SIGNIFICANT CHANGE UP (ref 32–36)
MCV RBC AUTO: 73.7 FL — LOW (ref 80–100)
MONOCYTES # BLD AUTO: 0.69 K/UL — SIGNIFICANT CHANGE UP (ref 0–0.9)
MONOCYTES NFR BLD AUTO: 8 % — SIGNIFICANT CHANGE UP (ref 2–14)
NEUTROPHILS # BLD AUTO: 5.42 K/UL — SIGNIFICANT CHANGE UP (ref 1.8–7.4)
NEUTROPHILS NFR BLD AUTO: 62.7 % — SIGNIFICANT CHANGE UP (ref 43–77)
NRBC # BLD: 0 /100 WBCS — SIGNIFICANT CHANGE UP (ref 0–0)
PLATELET # BLD AUTO: 410 K/UL — HIGH (ref 150–400)
POTASSIUM SERPL-MCNC: 4.4 MMOL/L — SIGNIFICANT CHANGE UP (ref 3.5–5.3)
POTASSIUM SERPL-SCNC: 4.4 MMOL/L — SIGNIFICANT CHANGE UP (ref 3.5–5.3)
PROT SERPL-MCNC: 8 G/DL — SIGNIFICANT CHANGE UP (ref 6–8.3)
RBC # BLD: 3.15 M/UL — LOW (ref 4.2–5.8)
RBC # FLD: 15.9 % — HIGH (ref 10.3–14.5)
SODIUM SERPL-SCNC: 140 MMOL/L — SIGNIFICANT CHANGE UP (ref 135–145)
VANCOMYCIN TROUGH SERPL-MCNC: 27 UG/ML — CRITICAL HIGH (ref 10–20)
WBC # BLD: 8.65 K/UL — SIGNIFICANT CHANGE UP (ref 3.8–10.5)
WBC # FLD AUTO: 8.65 K/UL — SIGNIFICANT CHANGE UP (ref 3.8–10.5)

## 2021-10-31 RX ORDER — VANCOMYCIN HCL 1 G
1000 VIAL (EA) INTRAVENOUS DAILY
Refills: 0 | Status: DISCONTINUED | OUTPATIENT
Start: 2021-11-01 | End: 2021-11-03

## 2021-10-31 RX ORDER — SENNA PLUS 8.6 MG/1
2 TABLET ORAL
Qty: 0 | Refills: 0 | DISCHARGE
Start: 2021-10-31

## 2021-10-31 RX ADMIN — Medication 2: at 11:29

## 2021-10-31 RX ADMIN — SENNA PLUS 2 TABLET(S): 8.6 TABLET ORAL at 22:14

## 2021-10-31 RX ADMIN — Medication 3 UNIT(S): at 17:32

## 2021-10-31 RX ADMIN — Medication 3 UNIT(S): at 11:28

## 2021-10-31 RX ADMIN — Medication 1: at 08:12

## 2021-10-31 RX ADMIN — Medication 325 MILLIGRAM(S): at 11:28

## 2021-10-31 RX ADMIN — HEPARIN SODIUM 5000 UNIT(S): 5000 INJECTION INTRAVENOUS; SUBCUTANEOUS at 06:43

## 2021-10-31 RX ADMIN — CARVEDILOL PHOSPHATE 25 MILLIGRAM(S): 80 CAPSULE, EXTENDED RELEASE ORAL at 06:43

## 2021-10-31 RX ADMIN — HEPARIN SODIUM 5000 UNIT(S): 5000 INJECTION INTRAVENOUS; SUBCUTANEOUS at 14:32

## 2021-10-31 RX ADMIN — AMLODIPINE BESYLATE 10 MILLIGRAM(S): 2.5 TABLET ORAL at 06:43

## 2021-10-31 RX ADMIN — INSULIN GLARGINE 10 UNIT(S): 100 INJECTION, SOLUTION SUBCUTANEOUS at 22:43

## 2021-10-31 RX ADMIN — Medication 3 UNIT(S): at 08:12

## 2021-10-31 RX ADMIN — HEPARIN SODIUM 5000 UNIT(S): 5000 INJECTION INTRAVENOUS; SUBCUTANEOUS at 22:14

## 2021-10-31 RX ADMIN — PANTOPRAZOLE SODIUM 40 MILLIGRAM(S): 20 TABLET, DELAYED RELEASE ORAL at 06:43

## 2021-10-31 RX ADMIN — CARVEDILOL PHOSPHATE 25 MILLIGRAM(S): 80 CAPSULE, EXTENDED RELEASE ORAL at 17:31

## 2021-10-31 RX ADMIN — Medication 81 MILLIGRAM(S): at 11:28

## 2021-10-31 NOTE — DISCHARGE NOTE PROVIDER - NSDCCPCAREPLAN_GEN_ALL_CORE_FT
PRINCIPAL DISCHARGE DIAGNOSIS  Diagnosis: Osteomyelitis of ankle or foot, acute  Assessment and Plan of Treatment: The MRI of your foot shows that you have osteomyelitis. Osteomyelitis is a severe bone infection and you were treated with antibiotics. Care for your wound as directed. Carefully wash the wound with soap and water. Dry the area and put on new, clean bandages as directed. Change your bandages when they get wet or dirty. Please follow up with the podiatry clinic.      SECONDARY DISCHARGE DIAGNOSES  Diagnosis: MRSA cellulitis  Assessment and Plan of Treatment: Your foot infection also showed that you have a bacteria called MRSA. MRSA (methicillin-resistant Staphylococcus aureus) is a strain of staph bacteria that can cause infection. you were given antibiotics to help fight this infection. Prevent the spread of MRSA: Wash your hands often with soap to prevent the spread of the bacteria. Do not touch your sores.    Diagnosis: Diabetic foot ulcer  Assessment and Plan of Treatment: A diabetic foot ulcer can be redness over a bony area or an open sore. The ulcer can develop anywhere on your foot or toes. Prevent diabetic foot ulcers: Good foot care may help prevent ulcers, or keep them from getting worse. Keep your blood sugar levels under control.    Diagnosis: CARRIE (acute kidney injury)  Assessment and Plan of Treatment: your kidney function was elevated more than your baseline likely due to , you were given IV fluids   your kidney function improved and remained stable at your baseline  while your kidneys are healing you should avoid agents that can cause kidney injury.  Some of these agents include NSAIDs like motrin, Gadolinium contrast, and Phosphate containing enemas.  please continue to take your medications as directed and follow up with your primary care provider.    Diagnosis: Hypertension  Assessment and Plan of Treatment: please continue to take your blood pressure medications and follow up with your primary care provider.     PRINCIPAL DISCHARGE DIAGNOSIS  Diagnosis: Osteomyelitis of ankle or foot, acute  Assessment and Plan of Treatment: The MRI of your foot shows that you have osteomyelitis. Osteomyelitis is a severe bone infection and you were treated with antibiotics. Care for your wound as directed. Carefully wash the wound with soap and water. Dry the area and put on new, clean bandages as directed. Change your bandages when they get wet or dirty. Please follow up with the podiatry clinic.      SECONDARY DISCHARGE DIAGNOSES  Diagnosis: MRSA cellulitis  Assessment and Plan of Treatment: Your foot infection also showed that you have a bacteria called MRSA. MRSA (methicillin-resistant Staphylococcus aureus) is a strain of staph bacteria that can cause infection. you were given antibiotics to help fight this infection. Prevent the spread of MRSA: Wash your hands often with soap to prevent the spread of the bacteria. Do not touch your sores.    Diagnosis: Diabetic foot ulcer  Assessment and Plan of Treatment: A diabetic foot ulcer can be redness over a bony area or an open sore. The ulcer can develop anywhere on your foot or toes. Prevent diabetic foot ulcers: Good foot care may help prevent ulcers, or keep them from getting worse. Keep your blood sugar levels under control.    Diagnosis: CARRIE (acute kidney injury)  Assessment and Plan of Treatment: your kidney function was elevated more than your baseline likely due to your foot infection, you were given IV fluids and antibiotics.  your kidney function improved and remained stable at your baseline  while your kidneys are healing you should avoid agents that can cause kidney injury.  Some of these agents include NSAIDs like motrin, Gadolinium contrast, and Phosphate containing enemas.  please continue to take your medications as directed and follow up with your primary care provider.    Diagnosis: Hypertension  Assessment and Plan of Treatment: please continue to take your blood pressure medications and follow up with your primary care provider.     PRINCIPAL DISCHARGE DIAGNOSIS  Diagnosis: Osteomyelitis of ankle or foot, acute  Assessment and Plan of Treatment: You presented to the hospital for foot wound   The MRI of your foot shows that you have osteomyelitis. Osteomyelitis is a severe bone infection and you were treated with antibiotics.  You were evaluated by Podiatry and ID team   You had debridment and graft done on 10/27  Care for your wound as directed.   Change your bandages when they get wet or dirty. Please follow up with the podiatry clinic.      SECONDARY DISCHARGE DIAGNOSES  Diagnosis: Diabetic foot ulcer  Assessment and Plan of Treatment: A diabetic foot ulcer can be redness over a bony area or an open sore. The ulcer can develop anywhere on your foot or toes. Prevent diabetic foot ulcers: Good foot care may help prevent ulcers, or keep them from getting worse. Keep your blood sugar levels under control.    Diagnosis: CARRIE (acute kidney injury)  Assessment and Plan of Treatment: your kidney function was elevated more than your baseline likely due to your foot infection, you were given IV fluids and antibiotics.  your kidney function improved and remained stable at your baseline  while your kidneys are healing you should avoid agents that can cause kidney injury.  Some of these agents include NSAIDs like motrin, Gadolinium contrast, and Phosphate containing enemas.  please continue to take your medications as directed and follow up with your primary care provider.    Diagnosis: Hypertension  Assessment and Plan of Treatment: please continue to take your blood pressure medications and follow up with your primary care provider.    Diagnosis: MRSA cellulitis  Assessment and Plan of Treatment: Your foot infection also showed that you have a bacteria called MRSA. MRSA (methicillin-resistant Staphylococcus aureus) is a strain of staph bacteria that can cause infection. you were given antibiotics to help fight this infection. Prevent the spread of MRSA: Wash your hands often with soap to prevent the spread of the bacteria. Do not touch your sores.    Diagnosis: DM (diabetes mellitus), type 2 with peripheral vascular complications  Assessment and Plan of Treatment: HgA1C this admission 8.5  Make sure you get your HgA1c checked every three months.  If you take oral diabetes medications, check your blood glucose two times a day.  If you take insulin, check your blood glucose before meals and at bedtime.  It's important not to skip any meals.  Keep a log of your blood glucose results and always take it with you to your doctor appointments.  Keep a list of your current medications including injectables and over the counter medications and bring this medication list with you to all your doctor appointments.  If you have not seen your ophthalmologist this year call for appointment.  Check your feet daily for redness, sores, or openings. Do not self treat.     PRINCIPAL DISCHARGE DIAGNOSIS  Diagnosis: Osteomyelitis of ankle or foot, acute  Assessment and Plan of Treatment: You presented to the hospital for foot wound   The MRI of your foot shows that you have osteomyelitis. Osteomyelitis is a severe bone infection and you were treated with antibiotics.  You were evaluated by Podiatry and ID team   You had debridment and graft done on 10/27  Pathology of bone bx was negative for osteomyelitis.  You were initially treated with intravenous antibiotics.  You are now transitioned to oral antibiotics for one week  DRESSING CHANGES:  -DRESS WITH BETADINE 4X4S, MARIA UGADALUPE, ACE  -KEEP DRESSING CLEAN DRY AND INTACT  . Please follow up with the podiatry clinic at [address] [place] [address] on Tuesday, Wednesday or thursday      SECONDARY DISCHARGE DIAGNOSES  Diagnosis: Diabetic foot ulcer  Assessment and Plan of Treatment: A diabetic foot ulcer can be redness over a bony area or an open sore. The ulcer can develop anywhere on your foot or toes. Prevent diabetic foot ulcers: Good foot care may help prevent ulcers, or keep them from getting worse. Keep your blood sugar levels under control.    Diagnosis: CARRIE (acute kidney injury)  Assessment and Plan of Treatment: your kidney function was elevated more than your baseline likely due to your foot infection, you were given IV fluids and antibiotics.  your kidney function improved and remained stable at your baseline  while your kidneys are healing you should avoid agents that can cause kidney injury.  Some of these agents include NSAIDs like motrin, Gadolinium contrast, and Phosphate containing enemas.  please continue to take your medications as directed and follow up with your primary care provider.    Diagnosis: Hypertension  Assessment and Plan of Treatment: please continue to take your blood pressure medications and follow up with your primary care provider.    Diagnosis: MRSA cellulitis  Assessment and Plan of Treatment: Your foot infection also showed that you have a bacteria called MRSA. MRSA (methicillin-resistant Staphylococcus aureus) is a strain of staph bacteria that can cause infection. you were given antibiotics to help fight this infection. Prevent the spread of MRSA: Wash your hands often with soap to prevent the spread of the bacteria. Do not touch your sores.    Diagnosis: DM (diabetes mellitus), type 2 with peripheral vascular complications  Assessment and Plan of Treatment: HgA1C this admission 8.5  Make sure you get your HgA1c checked every three months.  If you take oral diabetes medications, check your blood glucose two times a day.  If you take insulin, check your blood glucose before meals and at bedtime.  It's important not to skip any meals.  Keep a log of your blood glucose results and always take it with you to your doctor appointments.  Keep a list of your current medications including injectables and over the counter medications and bring this medication list with you to all your doctor appointments.  If you have not seen your ophthalmologist this year call for appointment.  Check your feet daily for redness, sores, or openings. Do not self treat.     PRINCIPAL DISCHARGE DIAGNOSIS  Diagnosis: Osteomyelitis of ankle or foot, acute  Assessment and Plan of Treatment: You presented to the hospital for foot wound   The MRI of your foot shows that you have osteomyelitis. Osteomyelitis is a severe bone infection and you were treated with antibiotics.  You were evaluated by Podiatry and ID team   You had debridment and graft done on 10/27  Pathology of bone bx was negative for osteomyelitis.  You were initially treated with intravenous antibiotics.  You are now transitioned to oral antibiotics for one week  DRESSING CHANGES:  -DRESS WITH DAKINS 4X4S, MARIA GUADALUPE, ACE  -KEEP DRESSING CLEAN DRY AND INTACT  . Please follow up with the podiatry clinic at [address] [place] [address] on WEDNESDAY 11/19-10:15 AM      SECONDARY DISCHARGE DIAGNOSES  Diagnosis: Diabetic foot ulcer  Assessment and Plan of Treatment: A diabetic foot ulcer can be redness over a bony area or an open sore. The ulcer can develop anywhere on your foot or toes. Prevent diabetic foot ulcers: Good foot care may help prevent ulcers, or keep them from getting worse. Keep your blood sugar levels under control.    Diagnosis: CARRIE (acute kidney injury)  Assessment and Plan of Treatment: your kidney function was elevated more than your baseline likely due to your foot infection, you were given IV fluids and antibiotics.  your kidney function improved and remained stable at your baseline  while your kidneys are healing you should avoid agents that can cause kidney injury.  Some of these agents include NSAIDs like motrin, Gadolinium contrast, and Phosphate containing enemas.  please continue to take your medications as directed and follow up with your primary care provider.    Diagnosis: Hypertension  Assessment and Plan of Treatment: please continue to take your blood pressure medications and follow up with your primary care provider.    Diagnosis: MRSA cellulitis  Assessment and Plan of Treatment: Your foot infection also showed that you have a bacteria called MRSA. MRSA (methicillin-resistant Staphylococcus aureus) is a strain of staph bacteria that can cause infection. you were given antibiotics to help fight this infection. Prevent the spread of MRSA: Wash your hands often with soap to prevent the spread of the bacteria. Do not touch your sores.    Diagnosis: DM (diabetes mellitus), type 2 with peripheral vascular complications  Assessment and Plan of Treatment: HgA1C this admission 8.5  Make sure you get your HgA1c checked every three months.  If you take oral diabetes medications, check your blood glucose two times a day.  If you take insulin, check your blood glucose before meals and at bedtime.  It's important not to skip any meals.  Keep a log of your blood glucose results and always take it with you to your doctor appointments.  Keep a list of your current medications including injectables and over the counter medications and bring this medication list with you to all your doctor appointments.  If you have not seen your ophthalmologist this year call for appointment.  Check your feet daily for redness, sores, or openings. Do not self treat.

## 2021-10-31 NOTE — PROGRESS NOTE ADULT - SUBJECTIVE AND OBJECTIVE BOX
Patient is a 56y old  Male who presents with a chief complaint of Diabetic foot ulcer (29 Oct 2021 10:18)      INTERVAL HPI/OVERNIGHT EVENTS: Patient seen and evaluated earlier this am;  no new complaints    MEDICATIONS  (STANDING):  amLODIPine   Tablet 10 milliGRAM(s) Oral daily  aspirin  chewable 81 milliGRAM(s) Oral daily  carvedilol 25 milliGRAM(s) Oral every 12 hours  ferrous    sulfate 325 milliGRAM(s) Oral daily  heparin   Injectable 5000 Unit(s) SubCutaneous every 8 hours  insulin glargine Injectable (LANTUS) 10 Unit(s) SubCutaneous at bedtime  insulin lispro (ADMELOG) corrective regimen sliding scale   SubCutaneous three times a day before meals  insulin lispro (ADMELOG) corrective regimen sliding scale   SubCutaneous at bedtime  insulin lispro Injectable (ADMELOG) 3 Unit(s) SubCutaneous three times a day before meals  pantoprazole    Tablet 40 milliGRAM(s) Oral before breakfast  senna 2 Tablet(s) Oral at bedtime    MEDICATIONS  (PRN):      __________________________________________________  REVIEW OF SYSTEMS:    CONSTITUTIONAL: No fever,   RESPIRATORY: No cough; No shortness of breath  CARDIOVASCULAR: No chest pain, no palpitations  GASTROINTESTINAL: No pain. No nausea or vomiting; No diarrhea   NEUROLOGICAL: No headache or numbness, no dizziness  MUSCULOSKELETAL: No joint pain, no muscle pain  GENITOURINARY: no dysuria, no frequency, no hematuria   PSYCHIATRY: no depression , no anxiety  ALL OTHER  ROS negative        Vital Signs Last 24 Hrs  T(C): 36.9 (10-31-21 @ 14:04), Max: 36.9 (10-31-21 @ 05:13)  T(F): 98.4 (10-31-21 @ 14:04), Max: 98.4 (10-31-21 @ 05:13)  HR: 63 (10-31-21 @ 14:04) (60 - 63)  BP: 133/55 (10-31-21 @ 14:04) (133/55 - 138/57)  BP(mean): --  RR: 18 (10-31-21 @ 14:04) (18 - 18)  SpO2: 96% (10-31-21 @ 14:04) (96% - 97%)    ________________________________________________  PHYSICAL EXAM:  GENERAL: NAD  HEENT: Normocephalic;  atraumatic   CHEST/LUNG: Breathing nonlabored; clear to auscultation bilaterally.  HEART: +S1 +S2  regular  ABDOMEN: Soft, Nontender, Nondistended; Bowel sounds present  EXTREMITIES: +2 bilateral radial pulses;  left foot dressing C/D/I ( wound vac not yet placed at time of assessment)   SKIN: warm and dry. + LLE hyperpigmentation   NERVOUS SYSTEM:  Awake and alert;     _________________________________________________  LABS:  10-31    140  |  107  |  38<H>  ----------------------------<  184<H>  4.4   |  27  |  1.93<H>    Ca    8.7      31 Oct 2021 04:12    TPro  8.0  /  Alb  1.9<L>  /  TBili  0.2  /  DBili      /  AST  35  /  ALT  29  /  AlkPhos  84  10-31    Creatinine Trend: 1.93 <--, 1.68 <--, 1.73 <--, 1.82 <--, 1.85 <--, 1.83 <--, 1.90 <--                        7.8    8.65  )-----------( 410      ( 31 Oct 2021 04:12 )             23.2     Urine Studies:    Creatinine, Random Urine: 57 mg/dL (10-26 @ 13:52)  Sodium, Random Urine: 61 mmol/L (10-26 @ 12:39)  Chloride, Random Urine: 69 mmol/L (10-26 @ 12:39)          LIVER FUNCTIONS - ( 31 Oct 2021 04:12 )  Alb: 1.9 g/dL / Pro: 8.0 g/dL / ALK PHOS: 84 U/L / ALT: 29 U/L DA / AST: 35 U/L / GGT: x               Culture yields >4 types of aerobic and/or anaerobic bacteria  Call client services within 7 days if further workup is clinically  indicated. Culture includes  Few Methicillin Resistant Staphylococcus aureus    Organism Identification: Methicillin resistant Staphylococcus aureus    Organism: Methicillin resistant Staphylococcus aureus    Method Type: FORD      < from: MR Foot No Cont, Left (10.26.21 @ 11:09) >  EXAM:  MR FOOT LT                            PROCEDURE DATE:  10/26/2021          INTERPRETATION:  EXAMINATION: MRI of the left ankle/hindfoot without contrast    CLINICAL INFORMATION: Left foot ulcer. Evaluate for osteomyelitis.    TECHNIQUE: Multiplanar, multisequential MR imaging was performed.    FINDINGS: There is a cutaneous wound along the plantar aspect of the calcaneus with adjacent skin thickening and confluent subcutaneous soft tissue infiltration that may be secondary to cellulitis/phlegmon and/or ill-defined scar tissue. There is nonspecific high T2 signal throughout the adjacent lateral half of the calcaneus without associated bony destruction or confluent low T1 marrow signal. The findings are nonspecific and may be secondaryto stress reaction and/or osteomyelitis.    There is also edema signal throughout the talus talus, likely stress reaction, although, osteomyelitis cannot entirely excluded. There appears to be a small loose body in the anterior tibiotalar recess. Patient is status post transmetatarsal amputation of the fifth ray. There is a small to moderate-sized tibiotalar joint effusion. 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    There is diffuse fatty atrophy and high T2 signal of musculature, consistent with denervation. There is marked thickening and poor definition of the central cord of the plantar fascia with adjacent bony productive changes. There is also severe thickening/tendinosis of the noninsertional portion ofthe partially imaged Achilles tendon.    There is diffuse subcutaneous soft tissue edema.    IMPRESSION: Cutaneous wound along the plantar aspect of the calcaneus with adjacent confluent subcutaneous soft tissue infiltration that may be secondary to phlegmon/infection and/or scarring. Correlate clinically.  Marrow edema signal throughout the adjacent lateral aspect of the calcaneus may be secondary to stress reaction and/or osteomyelitis.  Edema in the talus is favored to be secondary to stress reaction.  Severe insertional Achilles tendinosis.  Marked thickening and poor definition of the central cord of plantar fascia with adjacent bony productive changes.    --- End of Report ---            SHANNAN SANTANA MD; Attending Radiologist  This document has been electronically signed. Oct 26 2021 12:01PM    < end of copied text >

## 2021-10-31 NOTE — DISCHARGE NOTE PROVIDER - NSFOLLOWUPCLINICS_GEN_ALL_ED_FT
[facility]  Podiatry/Wound Care  [address] [place] [address]  Phone: [phone]  Fax: [phone]  Follow Up Time: 1 week

## 2021-10-31 NOTE — DISCHARGE NOTE PROVIDER - CARE PROVIDERS DIRECT ADDRESSES
,DirectAddress_Unknown,[email or web address] ,DirectAddress_Unknown ,DirectAddress_Unknown,DirectAddress_Unknown

## 2021-10-31 NOTE — DISCHARGE NOTE PROVIDER - HOSPITAL COURSE
51 y/o M with Pmhx  of Diabetes, HTN (not on meds), obesity, PSH of Left foot 5th digit amputation, who presented to the ED with complaints of chronic left heel ulcer for 2-3 months. Admitted to medicine for further management of left foot diabetic ulcer. MRI revealed possible OM ; followed by Podiatry and s/p debridement 10/27 with bone biopsy and graft placement. Cultures positive for MRSA; started on vancomycin and ID consulted & following. Pending surgical path result & ID clearance. PT rec ZACHARY       49 y/o M with Pmhx  of Diabetes, HTN (not on meds), obesity, PSH of Left foot 5th digit amputation, who presented to the ED with complaints of chronic left heel ulcer for 2-3 months. Admitted to medicine for further management of left foot diabetic ulcer. MRI revealed possible OM ; followed by Podiatry and s/p debridement 10/27 with bone biopsy and graft placement. Cultures positive for MRSA; started on vancomycin and ID consulted & following. Pending surgical path result & ID clearance. PT rec ZACHARY    incomplete 10/31 51 y/o M with Pmhx  of Diabetes, HTN (not on meds), obesity, PSH of Left foot 5th digit amputation, who presented to the ED with complaints of chronic left heel ulcer for 2-3 months. Admitted to medicine for further management of left foot diabetic ulcer. Started on IV abx.  MRI revealed possible OM ; followed by Podiatry and s/p debridement 10/27 with bone biopsy and graft placement. Cultures positive for MRSA; started on vancomycin and ID consulted & following.   Pending surgical path results ??????????  PT rec ZACHARY    incomplete 10/31  Please note that this a brief summary of hospital course please refer to daily progress notes and consult notes for full course and events 49 y/o M with Pmhx  of Diabetes, HTN (not on meds), obesity, PSH of Left foot 5th digit amputation, who presented to the ED with complaints of chronic left heel ulcer for 2-3 months. Admitted to medicine for further management of left foot diabetic ulcer. Started on IV abx.  MRI revealed possible OM ; followed by Podiatry and s/p debridement 10/27 with bone biopsy and graft placement. Cultures positive for MRSA; started on vancomycin and ID consulted.  Surgical path negative for OM.  PT rec ZACHARY.  Pt. is medically optimized for discharge to [facility].      Please note that this a brief summary of hospital course please refer to daily progress notes and consult notes for full course and events 51 y/o M with Pmhx  of Diabetes, HTN (not on meds), obesity, PSH of Left foot 5th digit amputation, who presented to the ED with complaints of chronic left heel ulcer for 2-3 months. Admitted to medicine for further management of left foot diabetic ulcer. Started on IV abx.  MRI revealed possible OM ; followed by Podiatry and s/p debridement 10/27 with bone biopsy and graft placement. Cultures positive for MRSA; started on vancomycin and ID consulted.  Surgical path negative for OM.  Wound vac removed on 11/1 by podiatrist. recommended to reapply as outpatient or as needed at nursing facility. Wound vac ordered by CM.   PT rec ZACHARY.  Pt. is medically optimized for discharge to [facility].      Please note that this a brief summary of hospital course please refer to daily progress notes and consult notes for full course and events      incomplete 11/2   podiatrist note for WCO: Applied dakins to 4x4 gauze, secure with sherrie, apply ACE. Will reassess again if vac can be applied, if patient is discharged prior to application pt can continue dressing changes until f/u appointment at podiatry clinic and vac will be applied there.   Id informed. 49 y/o M with Pmhx  of Diabetes, HTN (not on meds), obesity, PSH of Left foot 5th digit amputation, who presented to the ED with complaints of chronic left heel ulcer for 2-3 months. Admitted to medicine for further management of left foot diabetic ulcer. Started on IV abx.  MRI revealed possible OM ; followed by Podiatry and s/p debridement 10/27 with bone biopsy and graft placement. Cultures positive for MRSA; started on vancomycin and ID consulted.  Surgical path negative for OM.  Wound vac removed on 11/1 by podiatrist, currently with Dakins dressings to wound.  PT rec ZACHARY.  Pt. is medically optimized for discharge to [facility].  Appointment made for pt. at podiatry clinic [phone] for Wed 11/17-10:15am.      Please note that this a brief summary of hospital course please refer to daily progress notes and consult notes for full course and events

## 2021-10-31 NOTE — DISCHARGE NOTE PROVIDER - PROVIDER TOKENS
PROVIDER:[TOKEN:[[id number]],FOLLOWUP:[1 week]],PROVIDER:[TOKEN:[[id number]],FOLLOWUP:[1 week]] PROVIDER:[TOKEN:[[id number]],FOLLOWUP:[1 week]] PROVIDER:[TOKEN:[[id number]],FOLLOWUP:[1 week]],FREE:[LAST:[Podiatry Clinic],PHONE:[(   )    -],FAX:[(   )    -],ADDRESS:[[address] [place] [address]],SCHEDULEDAPPT:[11/17/2021],SCHEDULEDAPPTTIME:[10:15 AM]]

## 2021-10-31 NOTE — DISCHARGE NOTE PROVIDER - CARE PROVIDER_API CALL
Keny Sandoval)  Internal Medicine  [address] [place] [address]  Phone: [phone]  Fax: [phone]  Follow Up Time: 1 week    Philip Magana (DPM)  Surgery  [address] [place] [address]  Phone: [phone]  Fax: [phone]  Follow Up Time: 1 week   Keny Sandoval)  Internal Medicine  [address] [place] [address]  Phone: [phone]  Fax: [phone]  Follow Up Time: 1 week   Keny Sandoval)  Internal Medicine  [address] [place] [address]  Phone: [phone]  Fax: [phone]  Follow Up Time: 1 week    Podiatry Clinic,   [address] [place] [address]  Phone: (   )    -  Fax: (   )    -  Scheduled Appointment: 11/17/2021 10:15 AM

## 2021-10-31 NOTE — DISCHARGE NOTE PROVIDER - NSDCMRMEDTOKEN_GEN_ALL_CORE_FT
Admelog 100 units/mL injectable solution: 4 unit(s) injectable 3 times a day  amLODIPine 10 mg oral tablet: 1 tab(s) orally once a day  aspirin 81 mg oral tablet, chewable: 1 tab(s) orally once a day  carvedilol 25 mg oral tablet: 1 tab(s) orally 2 times a day  ferrous gluconate 324 mg (38 mg elemental iron) oral tablet: 1 tab(s) orally once a day  furosemide 20 mg oral tablet: 1 tab(s) orally once a day  senna oral tablet: 2 tab(s) orally once a day (at bedtime)  Tresiba 100 units/mL subcutaneous solution: 18 unit(s) subcutaneous once a day (at bedtime)   Admelog 100 units/mL injectable solution: 4 unit(s) injectable 3 times a day  amLODIPine 10 mg oral tablet: 1 tab(s) orally once a day  aspirin 81 mg oral tablet, chewable: 1 tab(s) orally once a day  carvedilol 25 mg oral tablet: 1 tab(s) orally 2 times a day  doxycycline hyclate 100 mg oral capsule: 1 cap(s) orally 2 times a day   ferrous gluconate 324 mg (38 mg elemental iron) oral tablet: 1 tab(s) orally once a day  furosemide 20 mg oral tablet: 1 tab(s) orally once a day  senna oral tablet: 2 tab(s) orally once a day (at bedtime)  Tresiba 100 units/mL subcutaneous solution: 18 unit(s) subcutaneous once a day (at bedtime)

## 2021-11-01 LAB
ALBUMIN SERPL ELPH-MCNC: 1.9 G/DL — LOW (ref 3.5–5)
ALP SERPL-CCNC: 89 U/L — SIGNIFICANT CHANGE UP (ref 40–120)
ALT FLD-CCNC: 30 U/L DA — SIGNIFICANT CHANGE UP (ref 10–60)
ANION GAP SERPL CALC-SCNC: 5 MMOL/L — SIGNIFICANT CHANGE UP (ref 5–17)
AST SERPL-CCNC: 29 U/L — SIGNIFICANT CHANGE UP (ref 10–40)
BASOPHILS # BLD AUTO: 0.04 K/UL — SIGNIFICANT CHANGE UP (ref 0–0.2)
BASOPHILS NFR BLD AUTO: 0.5 % — SIGNIFICANT CHANGE UP (ref 0–2)
BILIRUB SERPL-MCNC: 0.2 MG/DL — SIGNIFICANT CHANGE UP (ref 0.2–1.2)
BUN SERPL-MCNC: 44 MG/DL — HIGH (ref 7–18)
CALCIUM SERPL-MCNC: 9 MG/DL — SIGNIFICANT CHANGE UP (ref 8.4–10.5)
CHLORIDE SERPL-SCNC: 107 MMOL/L — SIGNIFICANT CHANGE UP (ref 96–108)
CO2 SERPL-SCNC: 26 MMOL/L — SIGNIFICANT CHANGE UP (ref 22–31)
CREAT SERPL-MCNC: 2.32 MG/DL — HIGH (ref 0.5–1.3)
CULTURE RESULTS: SIGNIFICANT CHANGE UP
EOSINOPHIL # BLD AUTO: 0.25 K/UL — SIGNIFICANT CHANGE UP (ref 0–0.5)
EOSINOPHIL NFR BLD AUTO: 2.8 % — SIGNIFICANT CHANGE UP (ref 0–6)
GLUCOSE BLDC GLUCOMTR-MCNC: 114 MG/DL — HIGH (ref 70–99)
GLUCOSE BLDC GLUCOMTR-MCNC: 167 MG/DL — HIGH (ref 70–99)
GLUCOSE BLDC GLUCOMTR-MCNC: 188 MG/DL — HIGH (ref 70–99)
GLUCOSE BLDC GLUCOMTR-MCNC: 234 MG/DL — HIGH (ref 70–99)
GLUCOSE SERPL-MCNC: 192 MG/DL — HIGH (ref 70–99)
HCT VFR BLD CALC: 25.7 % — LOW (ref 39–50)
HGB BLD-MCNC: 8.4 G/DL — LOW (ref 13–17)
IMM GRANULOCYTES NFR BLD AUTO: 0.5 % — SIGNIFICANT CHANGE UP (ref 0–1.5)
LYMPHOCYTES # BLD AUTO: 1.98 K/UL — SIGNIFICANT CHANGE UP (ref 1–3.3)
LYMPHOCYTES # BLD AUTO: 22.4 % — SIGNIFICANT CHANGE UP (ref 13–44)
MCHC RBC-ENTMCNC: 24.6 PG — LOW (ref 27–34)
MCHC RBC-ENTMCNC: 32.7 GM/DL — SIGNIFICANT CHANGE UP (ref 32–36)
MCV RBC AUTO: 75.4 FL — LOW (ref 80–100)
MONOCYTES # BLD AUTO: 0.58 K/UL — SIGNIFICANT CHANGE UP (ref 0–0.9)
MONOCYTES NFR BLD AUTO: 6.6 % — SIGNIFICANT CHANGE UP (ref 2–14)
NEUTROPHILS # BLD AUTO: 5.96 K/UL — SIGNIFICANT CHANGE UP (ref 1.8–7.4)
NEUTROPHILS NFR BLD AUTO: 67.2 % — SIGNIFICANT CHANGE UP (ref 43–77)
NRBC # BLD: 0 /100 WBCS — SIGNIFICANT CHANGE UP (ref 0–0)
PLATELET # BLD AUTO: 420 K/UL — HIGH (ref 150–400)
POTASSIUM SERPL-MCNC: 4.3 MMOL/L — SIGNIFICANT CHANGE UP (ref 3.5–5.3)
POTASSIUM SERPL-SCNC: 4.3 MMOL/L — SIGNIFICANT CHANGE UP (ref 3.5–5.3)
PROT SERPL-MCNC: 8.5 G/DL — HIGH (ref 6–8.3)
RBC # BLD: 3.41 M/UL — LOW (ref 4.2–5.8)
RBC # FLD: 15.9 % — HIGH (ref 10.3–14.5)
SARS-COV-2 RNA SPEC QL NAA+PROBE: SIGNIFICANT CHANGE UP
SODIUM SERPL-SCNC: 138 MMOL/L — SIGNIFICANT CHANGE UP (ref 135–145)
SPECIMEN SOURCE: SIGNIFICANT CHANGE UP
VANCOMYCIN TROUGH SERPL-MCNC: 15.7 UG/ML — SIGNIFICANT CHANGE UP (ref 10–20)
WBC # BLD: 8.85 K/UL — SIGNIFICANT CHANGE UP (ref 3.8–10.5)
WBC # FLD AUTO: 8.85 K/UL — SIGNIFICANT CHANGE UP (ref 3.8–10.5)

## 2021-11-01 RX ORDER — SODIUM HYPOCHLORITE 0.125 %
1 SOLUTION, NON-ORAL MISCELLANEOUS
Refills: 0 | Status: DISCONTINUED | OUTPATIENT
Start: 2021-11-01 | End: 2021-11-03

## 2021-11-01 RX ORDER — SODIUM CHLORIDE 9 MG/ML
1000 INJECTION INTRAMUSCULAR; INTRAVENOUS; SUBCUTANEOUS
Refills: 0 | Status: DISCONTINUED | OUTPATIENT
Start: 2021-11-01 | End: 2021-11-03

## 2021-11-01 RX ADMIN — CARVEDILOL PHOSPHATE 25 MILLIGRAM(S): 80 CAPSULE, EXTENDED RELEASE ORAL at 06:01

## 2021-11-01 RX ADMIN — Medication 3 UNIT(S): at 09:06

## 2021-11-01 RX ADMIN — CARVEDILOL PHOSPHATE 25 MILLIGRAM(S): 80 CAPSULE, EXTENDED RELEASE ORAL at 17:32

## 2021-11-01 RX ADMIN — INSULIN GLARGINE 10 UNIT(S): 100 INJECTION, SOLUTION SUBCUTANEOUS at 22:32

## 2021-11-01 RX ADMIN — Medication 1: at 09:06

## 2021-11-01 RX ADMIN — Medication 2: at 11:46

## 2021-11-01 RX ADMIN — Medication 325 MILLIGRAM(S): at 11:45

## 2021-11-01 RX ADMIN — Medication 1 APPLICATION(S): at 17:34

## 2021-11-01 RX ADMIN — Medication 81 MILLIGRAM(S): at 11:45

## 2021-11-01 RX ADMIN — HEPARIN SODIUM 5000 UNIT(S): 5000 INJECTION INTRAVENOUS; SUBCUTANEOUS at 06:01

## 2021-11-01 RX ADMIN — SENNA PLUS 2 TABLET(S): 8.6 TABLET ORAL at 22:37

## 2021-11-01 RX ADMIN — AMLODIPINE BESYLATE 10 MILLIGRAM(S): 2.5 TABLET ORAL at 06:01

## 2021-11-01 RX ADMIN — SODIUM CHLORIDE 80 MILLILITER(S): 9 INJECTION INTRAMUSCULAR; INTRAVENOUS; SUBCUTANEOUS at 23:16

## 2021-11-01 RX ADMIN — Medication 3 UNIT(S): at 11:46

## 2021-11-01 RX ADMIN — Medication 1 APPLICATION(S): at 12:21

## 2021-11-01 RX ADMIN — Medication 3 UNIT(S): at 17:31

## 2021-11-01 RX ADMIN — HEPARIN SODIUM 5000 UNIT(S): 5000 INJECTION INTRAVENOUS; SUBCUTANEOUS at 22:32

## 2021-11-01 RX ADMIN — Medication 250 MILLIGRAM(S): at 11:45

## 2021-11-01 RX ADMIN — PANTOPRAZOLE SODIUM 40 MILLIGRAM(S): 20 TABLET, DELAYED RELEASE ORAL at 06:01

## 2021-11-01 RX ADMIN — HEPARIN SODIUM 5000 UNIT(S): 5000 INJECTION INTRAVENOUS; SUBCUTANEOUS at 13:03

## 2021-11-01 NOTE — PROGRESS NOTE ADULT - PROBLEM SELECTOR PLAN 5
Likely SCAR. Total Iron 24. Hgb 7.3 (8.1 on admit). FOBT neg  No overt s/s bleeding  - Continue  Ferrous sulfate w/ stool softener  Transfuse if Hgb < 7  Continue to monitor CBC Likely SCAR. Total Iron 24. Hgb 8.4 (8.1 on admit). FOBT neg  No overt s/s bleeding  - Continue  Ferrous sulfate w/ stool softener  -Transfuse if Hgb < 7  -Continue to monitor CBC

## 2021-11-01 NOTE — PROGRESS NOTE ADULT - SUBJECTIVE AND OBJECTIVE BOX
NP Note discussed with  Primary Attending    Patient is a 56y old  Male who presents with a chief complaint of Diabetic foot ulcer (31 Oct 2021 12:02)      INTERVAL HPI/OVERNIGHT EVENTS: no new complaints    MEDICATIONS  (STANDING):  amLODIPine   Tablet 10 milliGRAM(s) Oral daily  aspirin  chewable 81 milliGRAM(s) Oral daily  carvedilol 25 milliGRAM(s) Oral every 12 hours  ferrous    sulfate 325 milliGRAM(s) Oral daily  heparin   Injectable 5000 Unit(s) SubCutaneous every 8 hours  insulin glargine Injectable (LANTUS) 10 Unit(s) SubCutaneous at bedtime  insulin lispro (ADMELOG) corrective regimen sliding scale   SubCutaneous three times a day before meals  insulin lispro (ADMELOG) corrective regimen sliding scale   SubCutaneous at bedtime  insulin lispro Injectable (ADMELOG) 3 Unit(s) SubCutaneous three times a day before meals  pantoprazole    Tablet 40 milliGRAM(s) Oral before breakfast  senna 2 Tablet(s) Oral at bedtime  sodium chloride 0.9%. 1000 milliLiter(s) (80 mL/Hr) IV Continuous <Continuous>  vancomycin  IVPB 1000 milliGRAM(s) IV Intermittent daily    MEDICATIONS  (PRN):      __________________________________________________  REVIEW OF SYSTEMS:    CONSTITUTIONAL: No fever,   EYES: no acute visual disturbances  NECK: No pain or stiffness  RESPIRATORY: No cough; No shortness of breath  CARDIOVASCULAR: No chest pain, no palpitations  GASTROINTESTINAL: No pain. No nausea or vomiting; No diarrhea   NEUROLOGICAL: No headache or numbness, no tremors  MUSCULOSKELETAL: No joint pain, no muscle pain  GENITOURINARY: no dysuria, no frequency, no hesitancy  PSYCHIATRY: no depression , no anxiety  ALL OTHER  ROS negative        Vital Signs Last 24 Hrs  T(C): 36.6 (01 Nov 2021 05:38), Max: 36.9 (31 Oct 2021 14:04)  T(F): 97.9 (01 Nov 2021 05:38), Max: 98.5 (31 Oct 2021 22:10)  HR: 62 (01 Nov 2021 05:38) (62 - 64)  BP: 131/61 (01 Nov 2021 05:38) (120/55 - 133/55)  BP(mean): --  RR: 18 (01 Nov 2021 05:38) (18 - 18)  SpO2: 96% (01 Nov 2021 05:38) (96% - 98%)    ________________________________________________  PHYSICAL EXAM: well developed, well groomed, comfortable  GENERAL: NAD  HEENT: Normocephalic;  conjunctivae and sclerae clear; moist mucous membranes;   NECK : supple  CHEST/LUNG: Clear to auscultation bilaterally with good air entry   HEART: S1 S2  regular; no murmurs, gallops or rubs  ABDOMEN: Soft, Nontender, Nondistended; Bowel sounds present  EXTREMITIES: Left foot drsg C/D/II, no cyanosis; no edema; no calf tenderness  SKIN: warm and dry; no rash  NERVOUS SYSTEM:  Awake and alert; Oriented  to place, person and time ; no new deficits    _________________________________________________  LABS:                        8.4    8.85  )-----------( 420      ( 01 Nov 2021 07:22 )             25.7     11-01    138  |  107  |  44<H>  ----------------------------<  192<H>  4.3   |  26  |  2.32<H>    Ca    9.0      01 Nov 2021 07:22    TPro  8.5<H>  /  Alb  1.9<L>  /  TBili  0.2  /  DBili  x   /  AST  29  /  ALT  30  /  AlkPhos  89  11-01        CAPILLARY BLOOD GLUCOSE      POCT Blood Glucose.: 188 mg/dL (01 Nov 2021 08:15)  POCT Blood Glucose.: 140 mg/dL (31 Oct 2021 22:22)  POCT Blood Glucose.: 146 mg/dL (31 Oct 2021 17:14)  POCT Blood Glucose.: 214 mg/dL (31 Oct 2021 11:27)    RADIOLOGY & ADDITIONAL TESTS:    Xray Foot AP + Lateral, Left (10.27.21 @ 15:30) >  EXAM:  XR FOOT 2 VIEWS LT                            PROCEDURE DATE:  10/27/2021          INTERPRETATION:  LEFT foot    CLINICAL INFORMATION: Postop  Comparison: 10/25/2021  TECHNIQUE: AP,lateral views.    FINDINGS:  Status post I&D of heel soft tissue ulceration.  Status post bone biopsy of the calcaneus.  Cortical defect of the posterior inferior calcaneus either concerning for early osteomyelitis.  Severe narrowing of subtalar joint space.    Status post prior transmetatarsal amputation of fifth distal ray.  Diffuse LEFT foot soft tissue swelling.  There is DIP and arthritic disease with subchondral lucency of distal first phalanx of indeterminate etiology.    IMPRESSION:    Postop change as described.    If osteomyelitis is considered  despite conservative therapy, and soft tissue / bone infection requires further assessment, follow-up MRI recommended.    < end of copied text >    MR Foot No Cont, Left (10.26.21 @ 11:09) >  EXAM:  MR FOOT LT                            PROCEDURE DATE:  10/26/2021          INTERPRETATION:  EXAMINATION: MRI of the left ankle/hindfoot without contrast    CLINICAL INFORMATION: Left foot ulcer. Evaluate for osteomyelitis.    TECHNIQUE: Multiplanar, multisequential MR imaging was performed.    FINDINGS: There is a cutaneous wound along the plantar aspect of the calcaneus with adjacent skin thickening and confluent subcutaneous soft tissue infiltration that may be secondary to cellulitis/phlegmon and/or ill-defined scar tissue. There is nonspecific high T2 signal throughout the adjacent lateral half of the calcaneus without associated bony destruction or confluent low T1 marrow signal. The findings are nonspecific and may be secondaryto stress reaction and/or osteomyelitis.    There is also edema signal throughout the talus talus, likely stress reaction, although, osteomyelitis cannot entirely excluded. There appears to be a small loose body in the anterior tibiotalar recess. Patient is status post transmetatarsal amputation of the fifth ray. There is a small to moderate-sized tibiotalar joint effusion. 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    There is diffuse fatty atrophy and high T2 signal of musculature, consistent with denervation. There is marked thickening and poor definition of the central cord of the plantar fascia with adjacent bony productive changes. There is also severe thickening/tendinosis of the noninsertional portion ofthe partially imaged Achilles tendon.    There is diffuse subcutaneous soft tissue edema.    IMPRESSION: Cutaneous wound along the plantar aspect of the calcaneus with adjacent confluent subcutaneous soft tissue infiltration that may be secondary to phlegmon/infection and/or scarring. Correlate clinically.  Marrow edema signal throughout the adjacent lateral aspect of the calcaneus may be secondary to stress reaction and/or osteomyelitis.  Edema in the talus is favored to be secondary to stress reaction.  Severe insertional Achilles tendinosis.  Marked thickening and poor definition of the central cord of plantar fascia with adjacent bony productive changes.    --- End of Report ---    < end of copied text >    Xray Chest 1 View AP/PA (10.25.21 @ 03:39) >  EXAM:  XR CHEST AP OR PA 1V                            PROCEDURE DATE:  10/25/2021          INTERPRETATION:  CLINICAL INDICATION: 56 years  Male with admission for diabetic foot ulcer.    COMPARISON: 3/11/2021    AP view of the chest demonstrates stable right basilar discoid atelectasis and elevated right hemidiaphragm. The left costophrenic angle is partially omitted. There is diffuse haziness over the left lung which may be related to positioning or to effusion layering posteriorly. There is no pneumothorax. The pulmonary vasculature is normal. A metallic spring overlies the right lung base.    The heart, mediastinum and jovanny cannot be assessed due to projection. The patient's chin partially obscures lung apices.    Mild thoracic degenerative changes are present.    IMPRESSION:    Left lung haziness may reflect positioning or effusion layering posteriorly.    Right basilar discoid atelectasis.    Metallic spring overlies the right lung base.    --- End of Report ---    < end of copied text >      Surgical Pathology Report (10.27.21 @ 12:43)    Surgical Pathology Report:       Accession:                             [id number]    Collected Date/Time:                   10/27/2021 12:43 EDT  Received Date/Time:                    10/28/2021 08:00 EDT    Surgical Pathology Report - Auth (Verified)    Specimen(s) Submitted  1  Bone biopsy left calcaneus    Final Diagnosis    Bone, left calcaneus; biopsy:  - Remodeling bone with focal marrow fibrosis and chronic inflammation.  - Unremarkable periosteal tissue and fibrocartilage with degenerative    changes.  - There is no evidence of acute osteomyelitis.  Verified by: Piedad Palmer MD  (Electronic Signature)  Reported on: 10/29/21 13:15 EDT, [address]  Phone: [phone]   Fax: [phone]  _________________________________________________________________      [facility], [place] [address].      Imaging Personally Reviewed:  YES  Consultant(s) Notes Reviewed:   YES/ No    Care Discussed with Consultants :     Plan of care was discussed with patient and /or primary care giver; all questions and concerns were addressed and care was aligned with patient's wishes.

## 2021-11-01 NOTE — PROGRESS NOTE ADULT - ASSESSMENT
51 y/o M with Pmhx  of Diabetes, HTN (not on meds), obesity, PSH of Left foot 5th digit amputation, who presented to the ED with complaints of chronic left heel ulcer for 2-3 months. MRI revealed possible OM ; followed by Podiatry and s/p debridement 10/27 with bone biopsy and graft placement . Cultures positive for MRSA; started on vancomycin and ID consulted& following.   11/1-Pt. is comfortable with no c/o pain.  Surgical path from 10/27 negative for OM.    Pt. was recommended ZACHARY, choices were given by CM.  d/w ID path findings, recc to transition to PO Doxy 100mg PO BID x 1 week. 49 y/o M with Pmhx  of Diabetes, HTN (not on meds), obesity, PSH of Left foot 5th digit amputation, who presented to the ED with complaints of chronic left heel ulcer for 2-3 months. MRI revealed possible OM ; followed by Podiatry and s/p debridement 10/27 with bone biopsy and graft placement . Cultures positive for MRSA; started on vancomycin and ID consulted& following.   11/1-Pt. is comfortable with no c/o pain.  Surgical path from 10/27 negative for OM.    Pt. was recommended ZACHARY, choices were given by CM.  d/w ID path findings, recc to transition to PO Doxy 100mg PO BID x 1 week.  Pt. with wound vac, clear for discharge from podiatry stand point.  51 y/o M with Pmhx  of Diabetes, HTN (not on meds), obesity, PSH of Left foot 5th digit amputation, who presented to the ED with complaints of chronic left heel ulcer for 2-3 months. MRI revealed possible OM ; followed by Podiatry and s/p debridement 10/27 with bone biopsy and graft placement . Cultures positive for MRSA; started on vancomycin and ID consulted& following.   11/1-Pt. is comfortable with no c/o pain.  Surgical path from 10/27 negative for OM.    Pt. was recommended ZACHARY, choices were given by CM.  d/w ID path findings, recc to transition to PO Doxy 100mg PO BID x 1 week.  Pt. with wound vac, clear for discharge from podiatry stand point. Awaiting auth.

## 2021-11-01 NOTE — PROGRESS NOTE ADULT - PROBLEM SELECTOR PLAN 1
MRI revealed possible OM; bone Bx path negative for OM  -Will transition to PO Doxy per ID  -Podiatry following; wound vac to be applied today   -Dr. Love, ID, following MRI revealed possible OM; bone Bx path negative for OM  -Will transition to PO Doxy per ID  -Podiatry following; wound vac in place  -OK for discharge with wound vac from podiatry stand point  -CM following placement MRI revealed possible OM; bone Bx path negative for OM  -Will transition to PO Doxy upon discharge per ID  -Podiatry following; wound vac in place  -OK for discharge with wound vac from podiatry stand point  -CM following placement/auth

## 2021-11-01 NOTE — PROGRESS NOTE ADULT - SUBJECTIVE AND OBJECTIVE BOX
56y Male    Meds:  vancomycin  IVPB 1000 milliGRAM(s) IV Intermittent daily    Allergies    No Known Drug Allergies  shellfish (Angioedema)  shellfish (Unknown)    Intolerances        VITALS:  Vital Signs Last 24 Hrs  T(C): 36.8 (01 Nov 2021 13:55), Max: 36.9 (31 Oct 2021 22:10)  T(F): 98.2 (01 Nov 2021 13:55), Max: 98.5 (31 Oct 2021 22:10)  HR: 60 (01 Nov 2021 13:55) (60 - 67)  BP: 150/70 (01 Nov 2021 13:55) (120/55 - 150/70)  BP(mean): --  RR: 18 (01 Nov 2021 13:55) (18 - 18)  SpO2: 95% (01 Nov 2021 13:55) (95% - 98%)    LABS/DIAGNOSTIC TESTS:                          8.4    8.85  )-----------( 420      ( 01 Nov 2021 07:22 )             25.7         11-01    138  |  107  |  44<H>  ----------------------------<  192<H>  4.3   |  26  |  2.32<H>    Ca    9.0      01 Nov 2021 07:22    TPro  8.5<H>  /  Alb  1.9<L>  /  TBili  0.2  /  DBili  x   /  AST  29  /  ALT  30  /  AlkPhos  89  11-01      LIVER FUNCTIONS - ( 01 Nov 2021 07:22 )  Alb: 1.9 g/dL / Pro: 8.5 g/dL / ALK PHOS: 89 U/L / ALT: 30 U/L DA / AST: 29 U/L / GGT: x             CULTURES: .Tissue Other, Left calcaneus bone biopsy  10-27 @ 20:58   No growth  --    No polymorphonuclear cells seen per low power field  No organisms seen per oil power field      .Surgical Swab left foot wound  10-25 @ 20:57   Culture yields >4 types of aerobic and/or anaerobic bacteria  Call client services within 7 days if further workup is clinically  indicated. Culture includes  Few Methicillin Resistant Staphylococcus aureus  --  Methicillin resistant Staphylococcus aureus      .Blood Blood-Peripheral  10-25 @ 06:50   No Growth Final  --  --            RADIOLOGY:      ROS:  [  ] UNABLE TO ELICIT 56y Male who is doing well clinically, his left foot and leg swelling is decreasing slowly, he has no pain in the leg or foot, he is going to be going to rehab with the wound vac. He has no fevers or chills, no diarrhea. His bone biopsy is negative for acute Osteomyelitis.    Meds:  vancomycin  IVPB 1000 milliGRAM(s) IV Intermittent daily    Allergies    No Known Drug Allergies  shellfish (Angioedema)  shellfish (Unknown)    Intolerances        VITALS:  Vital Signs Last 24 Hrs  T(C): 36.8 (01 Nov 2021 13:55), Max: 36.9 (31 Oct 2021 22:10)  T(F): 98.2 (01 Nov 2021 13:55), Max: 98.5 (31 Oct 2021 22:10)  HR: 60 (01 Nov 2021 13:55) (60 - 67)  BP: 150/70 (01 Nov 2021 13:55) (120/55 - 150/70)  BP(mean): --  RR: 18 (01 Nov 2021 13:55) (18 - 18)  SpO2: 95% (01 Nov 2021 13:55) (95% - 98%)    LABS/DIAGNOSTIC TESTS:                          8.4    8.85  )-----------( 420      ( 01 Nov 2021 07:22 )             25.7         11-01    138  |  107  |  44<H>  ----------------------------<  192<H>  4.3   |  26  |  2.32<H>    Ca    9.0      01 Nov 2021 07:22    TPro  8.5<H>  /  Alb  1.9<L>  /  TBili  0.2  /  DBili  x   /  AST  29  /  ALT  30  /  AlkPhos  89  11-01      LIVER FUNCTIONS - ( 01 Nov 2021 07:22 )  Alb: 1.9 g/dL / Pro: 8.5 g/dL / ALK PHOS: 89 U/L / ALT: 30 U/L DA / AST: 29 U/L / GGT: x             CULTURES: .Tissue Other, Left calcaneus bone biopsy  10-27 @ 20:58   No growth  --    No polymorphonuclear cells seen per low power field  No organisms seen per oil power field      .Surgical Swab left foot wound  10-25 @ 20:57   Culture yields >4 types of aerobic and/or anaerobic bacteria  Call client services within 7 days if further workup is clinically  indicated. Culture includes  Few Methicillin Resistant Staphylococcus aureus  --  Methicillin resistant Staphylococcus aureus      .Blood Blood-Peripheral  10-25 @ 06:50   No Growth Final  --  --            RADIOLOGY:      ROS:  [  ] UNABLE TO ELICIT

## 2021-11-01 NOTE — PROGRESS NOTE ADULT - SKIN COMMENTS
decreased warmth of left foot and leg, foot is dressed
decreased warmth and swelling of left leg, discoloration still present
left leg cellulitis is improving, decreased swelling, decreased warmth

## 2021-11-01 NOTE — PROGRESS NOTE ADULT - RS GEN PE MLT RESP DETAILS PC
good air movement/clear to auscultation bilaterally/no rales/no rhonchi/no wheezes
breath sounds equal/good air movement/clear to auscultation bilaterally/no rales/no rhonchi
breath sounds equal/good air movement/clear to auscultation bilaterally/no rales/no rhonchi/no wheezes

## 2021-11-01 NOTE — PROGRESS NOTE ADULT - ASSESSMENT
Left foot and leg Cellulitis - improving      Plan - Cont  vancomycin at  1gm iv q 24hrs  monitor Vancomycin trough.  Can switch to doxycycline 100mgs po bid x 7 days upon discharge to rehab.

## 2021-11-01 NOTE — PROGRESS NOTE ADULT - PROBLEM SELECTOR PLAN 9
Pending surgical path result & ID clearance   PT rec ZACHARY  Care management following Surgical path negative for OM  -PT rec ZACHARY  -Pt. to be discharged with wound vac care  -Care management following, awaiting choices and auth

## 2021-11-01 NOTE — PROGRESS NOTE ADULT - PROBLEM SELECTOR PLAN 2
- Continue Vancomycin  - Vanco trough prior to 4th dose  - Continue to monitor CBC, vitals -Will transition to PO Doxy 100mg BID x 1week per ID -Cont Vanco for now  -Will transition to PO Doxy 100mg BID x 1week upon discharge per ID

## 2021-11-01 NOTE — PROGRESS NOTE ADULT - SUBJECTIVE AND OBJECTIVE BOX
Podiatry Interval HPI: Pt seen bedside resting comfortably s/p left foot wound debridement with graft application (10/27). Pt denies any pain to the foot.  States he is not ambulating to left foot. States he is waiting for rehab placement options. Denies constitutional symptoms. No other pedal complaints. Pt had a vac on, removed it today.      Podiatry HPI: 57 y/o diabetic male presents to the emergency department for a left heel wound. Patient states he was told to come to the ED by his podiatrist, Dr. Woodard. Patient states he has had the heel ulcer for 2 months. Patient states that the wound has progressively worsened in the last two months. Patient denies any pain to the wound. Patient states he has been following up with his podiatrist regularly for the past 8 months. Patient states he had a toe removed on his left foot several months ago. Patient admits he has diabetic neuropathy. Patient admits his recent blood sugar test was above 200. Patient denies smoking or drinking. Patient denies constitutional symptoms, denies trauma. No further pedal complaints.    HPI: Pt is a 49 y/o M with PMH of Diabetes (on insulin), Htn (says not on meds, but pharmacy states amlodipine, and carvedilol), anemia, obesity, PSH of Left foot 5th digit amputation, who presented to the ED with complaints of chronic left heel ulcer for 2-3 months. Patient states it has not been healing well. He has been seeing a podiatrist Dr. Ruffin, who sent him to the hospital for further evaluation. Pt says that he has sometimes seen some purple discharge from the wound, he denies any fevers or chills. He also denies any limited range of motion, difficulty with ambulation, pain at the site, or at the ankle joint. He denies any other complaints.     Upon further questioning after lab review. Patient endorses that his PCP told him to see a nephrologist for his kidney but he hadn't had the opportunity to go. He also knew about anemia and endorses some episodes of bleeding both painless BRBPR and black tarry stools around 8 months ago. Has never had a colonoscopy or endoscopy. Patient denies ever having a echocardiogram, or being told he has heart failure.  (25 Oct 2021 04:29)    Medications amLODIPine   Tablet 10 milliGRAM(s) Oral daily  aspirin  chewable 81 milliGRAM(s) Oral daily  carvedilol 25 milliGRAM(s) Oral every 12 hours  Dakins Solution - Full Strength 1 Application(s) Topical two times a day  ferrous    sulfate 325 milliGRAM(s) Oral daily  heparin   Injectable 5000 Unit(s) SubCutaneous every 8 hours  insulin glargine Injectable (LANTUS) 10 Unit(s) SubCutaneous at bedtime  insulin lispro (ADMELOG) corrective regimen sliding scale   SubCutaneous three times a day before meals  insulin lispro (ADMELOG) corrective regimen sliding scale   SubCutaneous at bedtime  insulin lispro Injectable (ADMELOG) 3 Unit(s) SubCutaneous three times a day before meals  pantoprazole    Tablet 40 milliGRAM(s) Oral before breakfast  senna 2 Tablet(s) Oral at bedtime  sodium chloride 0.9%. 1000 milliLiter(s) IV Continuous <Continuous>  vancomycin  IVPB 1000 milliGRAM(s) IV Intermittent daily    FHNo pertinent family history in first degree relatives    No pertinent family history in first degree relatives    ,   PMHDiabetes    No pertinent past medical history    Diabetes mellitus    Hypertension    Diabetic foot ulcer    Obesity       PSHNo significant past surgical history    History of amputation of toe        Labs                          8.4    8.85  )-----------( 420      ( 01 Nov 2021 07:22 )             25.7      11-01    138  |  107  |  44<H>  ----------------------------<  192<H>  4.3   |  26  |  2.32<H>    Ca    9.0      01 Nov 2021 07:22    TPro  8.5<H>  /  Alb  1.9<L>  /  TBili  0.2  /  DBili  x   /  AST  29  /  ALT  30  /  AlkPhos  89  11-01     Vital Signs Last 24 Hrs  T(C): 36.6 (01 Nov 2021 05:38), Max: 36.9 (31 Oct 2021 14:04)  T(F): 97.9 (01 Nov 2021 05:38), Max: 98.5 (31 Oct 2021 22:10)  HR: 62 (01 Nov 2021 05:38) (62 - 64)  BP: 131/61 (01 Nov 2021 05:38) (120/55 - 133/55)  BP(mean): --  RR: 18 (01 Nov 2021 05:38) (18 - 18)  SpO2: 96% (01 Nov 2021 05:38) (96% - 98%)  Sedimentation Rate, Erythrocyte: 104 mm/Hr (10-25-21 @ 19:38)  Sedimentation Rate, Erythrocyte: 104 mm/Hr (10-25-21 @ 13:07)         C-Reactive Protein, Serum: 66 mg/L (10-26-21 @ 02:58)  C-Reactive Protein, Serum: 75 mg/L (10-26-21 @ 01:59)   WBC Count: 8.85 K/uL (11-01-21 @ 07:22)      PHYSICAL EXAM  GEN: MALU COURTNEY is a pleasant well-nourished, well developed 56y Male in no acute distress, alert awake, and oriented to person, place and time.   LE Focused:  Patient presents with a surgical shoe on his left foot and normal shoegear on the right. Patient is unassisted and unaccompanied. Patient is AOx3 and NAD.    Vasc:  DP pulses non-palpable b/l. PT pulses non-palpable b/l. Dopler exam revealed biphasic DP and PT pulses b/l. Skin temperature was warm to warm to the LLE and warm to cool to the RLE. Generalized non-pitting edema appreciated to the LLE. CFT <3 seconds to the b/l distal hallux.  Derm: Left infracalcaneal wound noted with somagen graft intact and well adhered with staples. Wound measured 6.0 cm x 5.0 cm x 1.0 cm. Malodor appreciated. No drainage. No tunneling. Fibrogranular wound base with normal periwound. Suture noted to L lateral calcaneus. No streaking or erythema noted to LLE. Cicatrix noted to left 5th ray. Fissure noted to left distal hallux and posterior heels b/l.  11/1: Graft intact, staples intact, odor noted. No clinical signs if infection noted   Neuro: Light touch sensation absent b/l. Protective sensation grossly diminished b/l.  MSK: Muscle strength 5/5 for all pedal muscle groups b/l. No significant symptomatic limitations upon pedal joint ROM exams b/l. Patient had excessive amount of ankle joint dorsiflexion b/l. Left 5th ray amputation noted.      Imaging:    EXAM:  MR FOOT LT                          PROCEDURE DATE:  10/26/2021      INTERPRETATION:  EXAMINATION: MRI of the left ankle/hindfoot without contrast    CLINICAL INFORMATION: Left foot ulcer. Evaluate for osteomyelitis.    TECHNIQUE: Multiplanar, multisequential MR imaging was performed.    FINDINGS: There is a cutaneous wound along the plantar aspect of the calcaneus with adjacent skin thickening and confluent subcutaneous soft tissue infiltration that may be secondary to cellulitis/phlegmon and/or ill-defined scar tissue. There is nonspecific high T2 signal throughout the adjacent lateral half of the calcaneus without associated bony destruction or confluent low T1 marrow signal. The findings are nonspecific and may be secondaryto stress reaction and/or osteomyelitis.    There is also edema signal throughout the talus talus, likely stress reaction, although, osteomyelitis cannot entirely excluded. There appears to be a small loose body in the anterior tibiotalar recess. Patient is status post transmetatarsal amputation of the fifth ray. There is a small to moderate-sized tibiotalar joint effusion. 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    There is diffuse fatty atrophy and high T2 signal of musculature, consistent with denervation. There is marked thickening and poor definition of the central cord of the plantar fascia with adjacent bony productive changes. There is also severe thickening/tendinosis of the noninsertional portion ofthe partially imaged Achilles tendon.    There is diffuse subcutaneous soft tissue edema.    IMPRESSION: Cutaneous wound along the plantar aspect of the calcaneus with adjacent confluent subcutaneous soft tissue infiltration that may be secondary to phlegmon/infection and/or scarring. Correlate clinically.  Marrow edema signal throughout the adjacent lateral aspect of the calcaneus may be secondary to stress reaction and/or osteomyelitis.  Edema in the talus is favored to be secondary to stress reaction.  Severe insertional Achilles tendinosis.  Marked thickening and poor definition of the central cord of plantar fascia with adjacent bony productive changes.      EXAM:  XR FOOT COMP MIN 3 VIEWS LT                          PROCEDURE DATE:  10/25/2021      INTERPRETATION:  LEFT foot    CLINICAL INFORMATION: LEFT heel ulcer. Assess osteomyelitis.    TECHNIQUE: AP,lateral and oblique views.    FINDINGS:  Large  heel soft tissue ulceration.. Adjacent calcaneus shows no gross osteolysis..  There is diffuse soft tissue swelling.  Status post fifth transmetatarsal amputation.  Degenerative arthrosis of the phalanges.    IMPRESSION:    Large heel soft tissue ulceration..  No radiographic evidence of osteomyelitis.    If osteomyelitis is considered  despite conservative therapy, and soft tissue / bone infection requires further assessment, follow-up MRI recommended.      Cultures:   Specimen Source: .Surgical Swab left foot wound (10.25.21 @ 20:57) Culture Results:   Culture yields >4 types of aerobic and/or anaerobic bacteria   Call client services within 7 days if further workup is clinically   indicated. Culture includes   Few Methicillin Resistant Staphylococcus aureus (10.25.21 @ 20:57)     Specimen Source: .Tissue Other, Left calcaneus bone biopsy (10.27.21 @ 20:58) Gram Stain:     Bone pathology 10/31  No polymorphonuclear cells seen per low power field   No organisms seen per oil power field (10.27.21 @ 20:58)   1 Bone biopsy left calcaneus   Final Diagnosis   Bone, left calcaneus; biopsy:   - Remodeling bone with focal marrow fibrosis and chronic inflammation.   - Unremarkable periosteal tissue and fibrocartilage with degenerative   changes.   - There is no evidence of acute osteomyelitis.

## 2021-11-01 NOTE — CHART NOTE - NSCHARTNOTEFT_GEN_A_CORE
[facility]  He is medically optimized for discharge with wound vac on PO Doxy 100mg BID x 2 weeks. Pt. is approved for [facility].  Wound vac removed by podiatry, dressing applied, noted C/D/I.  Pt. is medically optimized for discharge  on PO Doxy 100mg BID x 1 week.

## 2021-11-01 NOTE — PROGRESS NOTE ADULT - GASTROINTESTINAL DETAILS
soft/nontender/no distention/bowel sounds normal/no guarding/no rigidity
soft/nontender/no distention/bowel sounds normal/no guarding/no rigidity
soft/nontender/no distention/no masses palpable/bowel sounds normal/no guarding/no rigidity

## 2021-11-01 NOTE — PROGRESS NOTE ADULT - PROBLEM SELECTOR PLAN 4
CARRIE vs likely CARRIE on CKD ( baseline appears to be 1.3-1.5 from previous admission).  Scr 1.9 on admission;  - SCr 1.7 today   - Lasix held for now; resume when feasible/ when at baseline   - Avoid nephrotoxins  - Continue to monitor BMP CARRIE on CKD ( baseline appears to be 1.3-1.5 from previous admission)-  - SCr 1.7 today   - Lasix held for now; resume when feasible/ when at baseline   - Avoid nephrotoxins  - Continue to monitor BMP CARRIE on CKD ( baseline appears to be 1.3-1.5 from previous admission)-worsening  - SCr 2.32 today   -IVF started  -Lasix held for now; resume when feasible/ when at baseline   -Avoid nephrotoxins  -Continue to monitor BMP

## 2021-11-01 NOTE — PROGRESS NOTE ADULT - ASSESSMENT
A:   s/p L heel wound debridement with somagen gratf application and bone biopsy 10/26/21  Left diabetic infracalcaneal ulcer  Diabetes Type 2 with peripheral neuropathy    P:   pt seen and evaluated  s/p left foot wound debridement with graft application, bone biopsy (10/27)  Culture of the wound shows MRSA prelim  Bone culture reviewed - no growth   Bone pathology- no OM   Dressed with betadine 4x4s, abd, sherrie, ACE  Pt stable from podiatry  Upon discharge please keep dressing clean, dry and intact  Pt to be nonwb to Left heel, pt can use knee scooter to offload heel   Recommend elevation of the left leg to help aid in post operative swelling   Recommend VNS for vac changes outpatient   Appreciate ID consult  Follow up in outpatient podiatry clinic at [address] [place] [address] on Tuesday wed, or Thursday   Discussed with Dr. Magana and seen bedside with Dr. Boothe

## 2021-11-02 LAB
ANION GAP SERPL CALC-SCNC: 5 MMOL/L — SIGNIFICANT CHANGE UP (ref 5–17)
BUN SERPL-MCNC: 43 MG/DL — HIGH (ref 7–18)
CALCIUM SERPL-MCNC: 8.9 MG/DL — SIGNIFICANT CHANGE UP (ref 8.4–10.5)
CHLORIDE SERPL-SCNC: 110 MMOL/L — HIGH (ref 96–108)
CO2 SERPL-SCNC: 26 MMOL/L — SIGNIFICANT CHANGE UP (ref 22–31)
CREAT SERPL-MCNC: 1.89 MG/DL — HIGH (ref 0.5–1.3)
GLUCOSE BLDC GLUCOMTR-MCNC: 166 MG/DL — HIGH (ref 70–99)
GLUCOSE BLDC GLUCOMTR-MCNC: 174 MG/DL — HIGH (ref 70–99)
GLUCOSE BLDC GLUCOMTR-MCNC: 182 MG/DL — HIGH (ref 70–99)
GLUCOSE BLDC GLUCOMTR-MCNC: 204 MG/DL — HIGH (ref 70–99)
GLUCOSE SERPL-MCNC: 169 MG/DL — HIGH (ref 70–99)
HCT VFR BLD CALC: 23.8 % — LOW (ref 39–50)
HGB BLD-MCNC: 7.9 G/DL — LOW (ref 13–17)
MCHC RBC-ENTMCNC: 24.9 PG — LOW (ref 27–34)
MCHC RBC-ENTMCNC: 33.2 GM/DL — SIGNIFICANT CHANGE UP (ref 32–36)
MCV RBC AUTO: 75.1 FL — LOW (ref 80–100)
NRBC # BLD: 0 /100 WBCS — SIGNIFICANT CHANGE UP (ref 0–0)
PLATELET # BLD AUTO: 381 K/UL — SIGNIFICANT CHANGE UP (ref 150–400)
POTASSIUM SERPL-MCNC: 4.5 MMOL/L — SIGNIFICANT CHANGE UP (ref 3.5–5.3)
POTASSIUM SERPL-SCNC: 4.5 MMOL/L — SIGNIFICANT CHANGE UP (ref 3.5–5.3)
RBC # BLD: 3.17 M/UL — LOW (ref 4.2–5.8)
RBC # FLD: 15.7 % — HIGH (ref 10.3–14.5)
SODIUM SERPL-SCNC: 141 MMOL/L — SIGNIFICANT CHANGE UP (ref 135–145)
WBC # BLD: 9.24 K/UL — SIGNIFICANT CHANGE UP (ref 3.8–10.5)
WBC # FLD AUTO: 9.24 K/UL — SIGNIFICANT CHANGE UP (ref 3.8–10.5)

## 2021-11-02 RX ADMIN — Medication 325 MILLIGRAM(S): at 11:57

## 2021-11-02 RX ADMIN — SENNA PLUS 2 TABLET(S): 8.6 TABLET ORAL at 21:03

## 2021-11-02 RX ADMIN — Medication 1: at 11:58

## 2021-11-02 RX ADMIN — Medication 3 UNIT(S): at 17:11

## 2021-11-02 RX ADMIN — Medication 1: at 08:31

## 2021-11-02 RX ADMIN — AMLODIPINE BESYLATE 10 MILLIGRAM(S): 2.5 TABLET ORAL at 05:33

## 2021-11-02 RX ADMIN — Medication 250 MILLIGRAM(S): at 11:58

## 2021-11-02 RX ADMIN — CARVEDILOL PHOSPHATE 25 MILLIGRAM(S): 80 CAPSULE, EXTENDED RELEASE ORAL at 05:33

## 2021-11-02 RX ADMIN — Medication 1 APPLICATION(S): at 07:01

## 2021-11-02 RX ADMIN — HEPARIN SODIUM 5000 UNIT(S): 5000 INJECTION INTRAVENOUS; SUBCUTANEOUS at 21:03

## 2021-11-02 RX ADMIN — INSULIN GLARGINE 10 UNIT(S): 100 INJECTION, SOLUTION SUBCUTANEOUS at 21:03

## 2021-11-02 RX ADMIN — Medication 1: at 17:11

## 2021-11-02 RX ADMIN — HEPARIN SODIUM 5000 UNIT(S): 5000 INJECTION INTRAVENOUS; SUBCUTANEOUS at 05:33

## 2021-11-02 RX ADMIN — Medication 3 UNIT(S): at 11:58

## 2021-11-02 RX ADMIN — Medication 81 MILLIGRAM(S): at 11:57

## 2021-11-02 RX ADMIN — PANTOPRAZOLE SODIUM 40 MILLIGRAM(S): 20 TABLET, DELAYED RELEASE ORAL at 05:40

## 2021-11-02 RX ADMIN — SODIUM CHLORIDE 80 MILLILITER(S): 9 INJECTION INTRAMUSCULAR; INTRAVENOUS; SUBCUTANEOUS at 11:57

## 2021-11-02 RX ADMIN — CARVEDILOL PHOSPHATE 25 MILLIGRAM(S): 80 CAPSULE, EXTENDED RELEASE ORAL at 17:10

## 2021-11-02 RX ADMIN — Medication 3 UNIT(S): at 08:30

## 2021-11-02 RX ADMIN — HEPARIN SODIUM 5000 UNIT(S): 5000 INJECTION INTRAVENOUS; SUBCUTANEOUS at 13:30

## 2021-11-02 NOTE — PROGRESS NOTE ADULT - PROBLEM SELECTOR PLAN 4
CARRIE on CKD ( baseline appears to be 1.3-1.5 from previous admission)-worsening  - SCr 2.32 today   -IVF started  -Lasix held for now; resume when feasible/ when at baseline   -Avoid nephrotoxins  -Continue to monitor BMP

## 2021-11-02 NOTE — PROGRESS NOTE ADULT - PROBLEM SELECTOR PLAN 5
Likely SCAR. Total Iron 24. Hgb 8.4 (8.1 on admit). FOBT neg  No overt s/s bleeding  - Continue  Ferrous sulfate w/ stool softener  -Transfuse if Hgb < 7  -Continue to monitor CBC

## 2021-11-02 NOTE — PROGRESS NOTE ADULT - PROBLEM SELECTOR PLAN 1
MRI revealed possible OM; bone Bx path negative for OM  -Will transition to PO Doxy upon discharge per ID  -Podiatry following; wound vac in place  -OK for discharge with wound vac from podiatry stand point, wound vac ordered to apply at facility.  -wound vac removed on 11/1 by podiatry.   -CM following placement/auth

## 2021-11-02 NOTE — PROGRESS NOTE ADULT - ASSESSMENT
A:   s/p L heel wound debridement with somagen gratf application and bone biopsy 10/26/21  Left diabetic infracalcaneal ulcer  Diabetes Type 2 with peripheral neuropathy    P:   pt seen and evaluated  s/p left foot wound debridement with graft application, bone biopsy (10/27)  Culture of the wound shows MRSA prelim  Bone culture reviewed - no growth   Bone pathology- no OM   Verbal consent was obtained. Staples were removed. Pt tolerated the procedure well. Foot and ankle were cleaned with dakins. Dakins soaked gauze applied to heel wound, DSD/ACE   Pt stable from podiatry  Upon discharge please keep dressing clean, dry and intact  Pt to be nonwb to Left heel, pt can use knee scooter to offload heel   Recommend elevation of the left leg to help aid in post operative swelling   Recommend VNS for vac changes outpatient   Appreciate ID consult  WCO: Applied dakins to 4x4 gauze, secure with sherrie, apply ACE. Will reassess again if vac can be applied, if patient is discharged prior to application pt can continue dressing changes until f/u appointment at podiatry clinic and vac will be applied there.   Follow up in outpatient podiatry clinic at [address] [place] [address] on Tuesday wed, or Thursday   Discussed with Dr. Magana and seen bedside with Dr. Forrester

## 2021-11-02 NOTE — PROGRESS NOTE ADULT - PROBLEM SELECTOR PLAN 9
Surgical path negative for OM  -PT rec ZACHARY  -Pt. to be discharged with wound vac care, ordered  -Care management following, awaiting choices and auth

## 2021-11-02 NOTE — PROGRESS NOTE ADULT - SUBJECTIVE AND OBJECTIVE BOX
Podiatry Interval HPI: Pt seen bedside resting comfortably s/p left foot wound debridement with graft application (10/27). Pt denies any pain to the foot.  States he is not ambulating to left foot. States he is waiting for rehab placement options. Denies constitutional symptoms. No other pedal complaints.     Podiatry HPI: 57 y/o diabetic male presents to the emergency department for a left heel wound. Patient states he was told to come to the ED by his podiatrist, Dr. Woodard. Patient states he has had the heel ulcer for 2 months. Patient states that the wound has progressively worsened in the last two months. Patient denies any pain to the wound. Patient states he has been following up with his podiatrist regularly for the past 8 months. Patient states he had a toe removed on his left foot several months ago. Patient admits he has diabetic neuropathy. Patient admits his recent blood sugar test was above 200. Patient denies smoking or drinking. Patient denies constitutional symptoms, denies trauma. No further pedal complaints.    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 Patient states it has not been healing well. He has been seeing a podiatrist Dr. Ruffin, who sent him to the hospital for further evaluation. Pt says that he has sometimes seen some purple discharge from the wound, he denies any fevers or chills. He also denies any limited range of motion, difficulty with ambulation, pain at the site, or at the ankle joint. He denies any other complaints.     Upon further questioning after lab review. Patient endorses that his PCP told him to see a nephrologist for his kidney but he hadn't had the opportunity to go. He also knew about anemia and endorses some episodes of bleeding both painless BRBPR and black tarry stools around 8 months ago. Has never had a colonoscopy or endoscopy. Patient denies ever having a echocardiogram, or being told he has heart failure.  (25 Oct 2021 04:29)    Medications amLODIPine   Tablet 10 milliGRAM(s) Oral daily  aspirin  chewable 81 milliGRAM(s) Oral daily  carvedilol 25 milliGRAM(s) Oral every 12 hours  Dakins Solution - Full Strength 1 Application(s) Topical two times a day  ferrous    sulfate 325 milliGRAM(s) Oral daily  heparin   Injectable 5000 Unit(s) SubCutaneous every 8 hours  insulin glargine Injectable (LANTUS) 10 Unit(s) SubCutaneous at bedtime  insulin lispro (ADMELOG) corrective regimen sliding scale   SubCutaneous three times a day before meals  insulin lispro (ADMELOG) corrective regimen sliding scale   SubCutaneous at bedtime  insulin lispro Injectable (ADMELOG) 3 Unit(s) SubCutaneous three times a day before meals  pantoprazole    Tablet 40 milliGRAM(s) Oral before breakfast  senna 2 Tablet(s) Oral at bedtime  sodium chloride 0.9%. 1000 milliLiter(s) IV Continuous <Continuous>  vancomycin  IVPB 1000 milliGRAM(s) IV Intermittent daily    FHNo pertinent family history in first degree relatives    No pertinent family history in first degree relatives    ,   PMHDiabetes    No pertinent past medical history    Diabetes mellitus    Hypertension    Diabetic foot ulcer    Obesity       PSHNo significant past surgical history    History of amputation of toe        Labs                          7.9    9.24  )-----------( 381      ( 02 Nov 2021 05:54 )             23.8      11-02    141  |  110<H>  |  43<H>  ----------------------------<  169<H>  4.5   |  26  |  1.89<H>    Ca    8.9      02 Nov 2021 05:54    TPro  8.5<H>  /  Alb  1.9<L>  /  TBili  0.2  /  DBili  x   /  AST  29  /  ALT  30  /  AlkPhos  89  11-01     Vital Signs Last 24 Hrs  T(C): 36.7 (02 Nov 2021 05:50), Max: 37 (01 Nov 2021 21:39)  T(F): 98.1 (02 Nov 2021 05:50), Max: 98.6 (01 Nov 2021 21:39)  HR: 69 (02 Nov 2021 05:50) (60 - 69)  BP: 125/59 (02 Nov 2021 05:50) (125/59 - 150/70)  BP(mean): --  RR: 17 (02 Nov 2021 05:50) (17 - 18)  SpO2: 99% (02 Nov 2021 05:50) (95% - 99%)  Sedimentation Rate, Erythrocyte: 104 mm/Hr (10-25-21 @ 19:38)  Sedimentation Rate, Erythrocyte: 104 mm/Hr (10-25-21 @ 13:07)         C-Reactive Protein, Serum: 66 mg/L (10-26-21 @ 02:58)  C-Reactive Protein, Serum: 75 mg/L (10-26-21 @ 01:59)   WBC Count: 9.24 K/uL (11-02-21 @ 05:54)    PHYSICAL EXAM  GEN: MALU COURTNEY is a pleasant well-nourished, well developed 56y Male in no acute distress, alert awake, and oriented to person, place and time.   LE Focused:  Patient presents with a surgical shoe on his left foot and normal shoegear on the right. Patient is unassisted and unaccompanied. Patient is AOx3 and NAD.    Vasc:  DP pulses non-palpable b/l. PT pulses non-palpable b/l. Dopler exam revealed biphasic DP and PT pulses b/l. Skin temperature was warm to warm to the LLE and warm to cool to the RLE. Generalized non-pitting edema appreciated to the LLE. CFT <3 seconds to the b/l distal hallux.  Derm: Left infracalcaneal wound noted with somagen graft intact and well adhered with staples. Wound measured 6.0 cm x 5.0 cm x 1.0 cm. Malodor appreciated. No drainage. No tunneling. Fibrogranular wound base with normal periwound. Suture noted to L lateral calcaneus. No streaking or erythema noted to LLE. Cicatrix noted to left 5th ray. Fissure noted to left distal hallux and posterior heels b/l.  11/1: Graft intact, staples intact, odor noted. No clinical signs if infection noted   11/2: graft intact, staples were removed today, no clinical signs of infection noted, malodorous   Neuro: Light touch sensation absent b/l. Protective sensation grossly diminished b/l.  MSK: Muscle strength 5/5 for all pedal muscle groups b/l. No significant symptomatic limitations upon pedal joint ROM exams b/l. Patient had excessive amount of ankle joint dorsiflexion b/l. Left 5th ray amputation noted.      Imaging:    EXAM:  MR FOOT LT                          PROCEDURE DATE:  10/26/2021      INTERPRETATION:  EXAMINATION: MRI of the left ankle/hindfoot without contrast    CLINICAL INFORMATION: Left foot ulcer. Evaluate for osteomyelitis.    TECHNIQUE: Multiplanar, multisequential MR imaging was performed.    FINDINGS: There is a cutaneous wound along the plantar aspect of the calcaneus with adjacent skin thickening and confluent subcutaneous soft tissue infiltration that may be secondary to cellulitis/phlegmon and/or ill-defined scar tissue. There is nonspecific high T2 signal throughout the adjacent lateral half of the calcaneus without associated bony destruction or confluent low T1 marrow signal. The findings are nonspecific and may be secondaryto stress reaction and/or osteomyelitis.    There is also edema signal throughout the talus talus, likely stress reaction, although, osteomyelitis cannot entirely excluded. There appears to be a small loose body in the anterior tibiotalar recess. Patient is status post transmetatarsal amputation of the fifth ray. There is a small to moderate-sized tibiotalar joint effusion. 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    There is diffuse fatty atrophy and high T2 signal of musculature, consistent with denervation. There is marked thickening and poor definition of the central cord of the plantar fascia with adjacent bony productive changes. There is also severe thickening/tendinosis of the noninsertional portion ofthe partially imaged Achilles tendon.    There is diffuse subcutaneous soft tissue edema.    IMPRESSION: Cutaneous wound along the plantar aspect of the calcaneus with adjacent confluent subcutaneous soft tissue infiltration that may be secondary to phlegmon/infection and/or scarring. Correlate clinically.  Marrow edema signal throughout the adjacent lateral aspect of the calcaneus may be secondary to stress reaction and/or osteomyelitis.  Edema in the talus is favored to be secondary to stress reaction.  Severe insertional Achilles tendinosis.  Marked thickening and poor definition of the central cord of plantar fascia with adjacent bony productive changes.      EXAM:  XR FOOT COMP MIN 3 VIEWS LT                          PROCEDURE DATE:  10/25/2021      INTERPRETATION:  LEFT foot    CLINICAL INFORMATION: LEFT heel ulcer. Assess osteomyelitis.    TECHNIQUE: AP,lateral and oblique views.    FINDINGS:  Large  heel soft tissue ulceration.. Adjacent calcaneus shows no gross osteolysis..  There is diffuse soft tissue swelling.  Status post fifth transmetatarsal amputation.  Degenerative arthrosis of the phalanges.    IMPRESSION:    Large heel soft tissue ulceration..  No radiographic evidence of osteomyelitis.    If osteomyelitis is considered  despite conservative therapy, and soft tissue / bone infection requires further assessment, follow-up MRI recommended.      Cultures:   Specimen Source: .Surgical Swab left foot wound (10.25.21 @ 20:57) Culture Results:   Culture yields >4 types of aerobic and/or anaerobic bacteria   Call client services within 7 days if further workup is clinically   indicated. Culture includes   Few Methicillin Resistant Staphylococcus aureus (10.25.21 @ 20:57)     Specimen Source: .Tissue Other, Left calcaneus bone biopsy (10.27.21 @ 20:58) Gram Stain:     Bone pathology 10/31  No polymorphonuclear cells seen per low power field   No organisms seen per oil power field (10.27.21 @ 20:58)   1 Bone biopsy left calcaneus   Final Diagnosis   Bone, left calcaneus; biopsy:   - Remodeling bone with focal marrow fibrosis and chronic inflammation.   - Unremarkable periosteal tissue and fibrocartilage with degenerative   changes.   - There is no evidence of acute osteomyelitis.

## 2021-11-02 NOTE — PROGRESS NOTE ADULT - ASSESSMENT
51 y/o M with Pmhx  of Diabetes, HTN (not on meds), obesity, PSH of Left foot 5th digit amputation, who presented to the ED with complaints of chronic left heel ulcer for 2-3 months. MRI revealed possible OM ; followed by Podiatry and s/p debridement 10/27 with bone biopsy and graft placement . Cultures positive for MRSA; started on vancomycin and ID consulted& following.   Surgical path from 10/27 negative for OM.    Pt. was recommended [facility], choices were given by CM.  d/w ID path findings, recc to transition to PO Doxy 100mg PO BID x 1 week.  Pt. with wound vac, removed by podiatrist 11/1, wound vac ordered by CM. clear for discharge from podiatry stand point. Awaiting auth.

## 2021-11-02 NOTE — PROGRESS NOTE ADULT - SUBJECTIVE AND OBJECTIVE BOX
NP Note discussed with  Primary Attending    INTERVAL HPI/OVERNIGHT EVENTS: no new complaints    MEDICATIONS  (STANDING):  amLODIPine   Tablet 10 milliGRAM(s) Oral daily  aspirin  chewable 81 milliGRAM(s) Oral daily  carvedilol 25 milliGRAM(s) Oral every 12 hours  Dakins Solution - Full Strength 1 Application(s) Topical two times a day  ferrous    sulfate 325 milliGRAM(s) Oral daily  heparin   Injectable 5000 Unit(s) SubCutaneous every 8 hours  insulin glargine Injectable (LANTUS) 10 Unit(s) SubCutaneous at bedtime  insulin lispro (ADMELOG) corrective regimen sliding scale   SubCutaneous three times a day before meals  insulin lispro (ADMELOG) corrective regimen sliding scale   SubCutaneous at bedtime  insulin lispro Injectable (ADMELOG) 3 Unit(s) SubCutaneous three times a day before meals  pantoprazole    Tablet 40 milliGRAM(s) Oral before breakfast  senna 2 Tablet(s) Oral at bedtime  sodium chloride 0.9%. 1000 milliLiter(s) (80 mL/Hr) IV Continuous <Continuous>  vancomycin  IVPB 1000 milliGRAM(s) IV Intermittent daily    MEDICATIONS  (PRN):      __________________________________________________  REVIEW OF SYSTEMS:    CONSTITUTIONAL: No fever,   EYES: no acute visual disturbances  NECK: No pain or stiffness  RESPIRATORY: No cough; No shortness of breath  CARDIOVASCULAR: No chest pain, no palpitations  GASTROINTESTINAL: No pain. No nausea or vomiting; No diarrhea   NEUROLOGICAL: No headache or numbness, no tremors  MUSCULOSKELETAL: No joint pain, no muscle pain  GENITOURINARY: no dysuria, no frequency, no hesitancy  PSYCHIATRY: no depression , no anxiety  ALL OTHER  ROS negative        Vital Signs Last 24 Hrs  T(C): 36.7 (02 Nov 2021 05:50), Max: 37 (01 Nov 2021 21:39)  T(F): 98.1 (02 Nov 2021 05:50), Max: 98.6 (01 Nov 2021 21:39)  HR: 69 (02 Nov 2021 05:50) (60 - 69)  BP: 125/59 (02 Nov 2021 05:50) (125/59 - 150/70)  BP(mean): --  RR: 17 (02 Nov 2021 05:50) (17 - 18)  SpO2: 99% (02 Nov 2021 05:50) (95% - 99%)    ________________________________________________  PHYSICAL EXAM:  GENERAL: NAD  HEENT: Normocephalic;  conjunctivae and sclerae clear; moist mucous membranes;   NECK : supple  CHEST/LUNG: Clear to auscultation bilaterally with good air entry   HEART: S1 S2  regular; no murmurs, gallops or rubs  ABDOMEN: Soft, Nontender, Nondistended; Bowel sounds present  EXTREMITIES: no cyanosis; no edema; no calf tenderness  SKIN: warm and dry; no rash, left foot dressing c/d/i  NERVOUS SYSTEM:  Awake and alert; Oriented  to place, person and time ; no new deficits    _________________________________________________  LABS:                        7.9    9.24  )-----------( 381      ( 02 Nov 2021 05:54 )             23.8     11-02    141  |  110<H>  |  43<H>  ----------------------------<  169<H>  4.5   |  26  |  1.89<H>    Ca    8.9      02 Nov 2021 05:54    TPro  8.5<H>  /  Alb  1.9<L>  /  TBili  0.2  /  DBili  x   /  AST  29  /  ALT  30  /  AlkPhos  89  11-01        CAPILLARY BLOOD GLUCOSE      POCT Blood Glucose.: 166 mg/dL (02 Nov 2021 08:04)  POCT Blood Glucose.: 167 mg/dL (01 Nov 2021 22:02)  POCT Blood Glucose.: 114 mg/dL (01 Nov 2021 16:48)  POCT Blood Glucose.: 234 mg/dL (01 Nov 2021 11:38)        RADIOLOGY & ADDITIONAL TESTS:    Imaging  Reviewed:  YES    Consultant(s) Notes Reviewed:   YES      Plan of care was discussed with patient and /or primary care giver; all questions and concerns were addressed

## 2021-11-03 ENCOUNTER — TRANSCRIPTION ENCOUNTER (OUTPATIENT)
Age: 56
End: 2021-11-03

## 2021-11-03 VITALS
SYSTOLIC BLOOD PRESSURE: 138 MMHG | HEART RATE: 74 BPM | RESPIRATION RATE: 18 BRPM | TEMPERATURE: 98 F | OXYGEN SATURATION: 100 % | DIASTOLIC BLOOD PRESSURE: 70 MMHG

## 2021-11-03 PROBLEM — E11.621 TYPE 2 DIABETES MELLITUS WITH FOOT ULCER: Chronic | Status: ACTIVE | Noted: 2021-10-25

## 2021-11-03 PROBLEM — E66.9 OBESITY, UNSPECIFIED: Chronic | Status: ACTIVE | Noted: 2021-10-25

## 2021-11-03 PROBLEM — I10 ESSENTIAL (PRIMARY) HYPERTENSION: Chronic | Status: ACTIVE | Noted: 2021-10-25

## 2021-11-03 PROBLEM — E11.9 TYPE 2 DIABETES MELLITUS WITHOUT COMPLICATIONS: Chronic | Status: ACTIVE | Noted: 2021-10-25

## 2021-11-03 LAB
GLUCOSE BLDC GLUCOMTR-MCNC: 130 MG/DL — HIGH (ref 70–99)
GLUCOSE BLDC GLUCOMTR-MCNC: 137 MG/DL — HIGH (ref 70–99)
GLUCOSE BLDC GLUCOMTR-MCNC: 162 MG/DL — HIGH (ref 70–99)
GLUCOSE BLDC GLUCOMTR-MCNC: 201 MG/DL — HIGH (ref 70–99)

## 2021-11-03 PROCEDURE — 85610 PROTHROMBIN TIME: CPT

## 2021-11-03 PROCEDURE — 73620 X-RAY EXAM OF FOOT: CPT

## 2021-11-03 PROCEDURE — 87635 SARS-COV-2 COVID-19 AMP PRB: CPT

## 2021-11-03 PROCEDURE — 85730 THROMBOPLASTIN TIME PARTIAL: CPT

## 2021-11-03 PROCEDURE — 83036 HEMOGLOBIN GLYCOSYLATED A1C: CPT

## 2021-11-03 PROCEDURE — 87075 CULTR BACTERIA EXCEPT BLOOD: CPT

## 2021-11-03 PROCEDURE — 82962 GLUCOSE BLOOD TEST: CPT

## 2021-11-03 PROCEDURE — 73630 X-RAY EXAM OF FOOT: CPT

## 2021-11-03 PROCEDURE — 86769 SARS-COV-2 COVID-19 ANTIBODY: CPT

## 2021-11-03 PROCEDURE — 73718 MRI LOWER EXTREMITY W/O DYE: CPT

## 2021-11-03 PROCEDURE — 84443 ASSAY THYROID STIM HORMONE: CPT

## 2021-11-03 PROCEDURE — 87040 BLOOD CULTURE FOR BACTERIA: CPT

## 2021-11-03 PROCEDURE — 71045 X-RAY EXAM CHEST 1 VIEW: CPT

## 2021-11-03 PROCEDURE — 83735 ASSAY OF MAGNESIUM: CPT

## 2021-11-03 PROCEDURE — 83605 ASSAY OF LACTIC ACID: CPT

## 2021-11-03 PROCEDURE — 80053 COMPREHEN METABOLIC PANEL: CPT

## 2021-11-03 PROCEDURE — 86803 HEPATITIS C AB TEST: CPT

## 2021-11-03 PROCEDURE — 87070 CULTURE OTHR SPECIMN AEROBIC: CPT

## 2021-11-03 PROCEDURE — 82436 ASSAY OF URINE CHLORIDE: CPT

## 2021-11-03 PROCEDURE — 83550 IRON BINDING TEST: CPT

## 2021-11-03 PROCEDURE — 86140 C-REACTIVE PROTEIN: CPT

## 2021-11-03 PROCEDURE — 85025 COMPLETE CBC W/AUTO DIFF WBC: CPT

## 2021-11-03 PROCEDURE — 84300 ASSAY OF URINE SODIUM: CPT

## 2021-11-03 PROCEDURE — 85027 COMPLETE CBC AUTOMATED: CPT

## 2021-11-03 PROCEDURE — 88311 DECALCIFY TISSUE: CPT

## 2021-11-03 PROCEDURE — 80061 LIPID PANEL: CPT

## 2021-11-03 PROCEDURE — 85652 RBC SED RATE AUTOMATED: CPT

## 2021-11-03 PROCEDURE — 82728 ASSAY OF FERRITIN: CPT

## 2021-11-03 PROCEDURE — 80048 BASIC METABOLIC PNL TOTAL CA: CPT

## 2021-11-03 PROCEDURE — 82570 ASSAY OF URINE CREATININE: CPT

## 2021-11-03 PROCEDURE — 36415 COLL VENOUS BLD VENIPUNCTURE: CPT

## 2021-11-03 PROCEDURE — 84100 ASSAY OF PHOSPHORUS: CPT

## 2021-11-03 PROCEDURE — 80202 ASSAY OF VANCOMYCIN: CPT

## 2021-11-03 PROCEDURE — 99285 EMERGENCY DEPT VISIT HI MDM: CPT

## 2021-11-03 PROCEDURE — 83540 ASSAY OF IRON: CPT

## 2021-11-03 PROCEDURE — 87186 SC STD MICRODIL/AGAR DIL: CPT

## 2021-11-03 PROCEDURE — 93306 TTE W/DOPPLER COMPLETE: CPT

## 2021-11-03 PROCEDURE — 84156 ASSAY OF PROTEIN URINE: CPT

## 2021-11-03 PROCEDURE — 88305 TISSUE EXAM BY PATHOLOGIST: CPT

## 2021-11-03 PROCEDURE — 82272 OCCULT BLD FECES 1-3 TESTS: CPT

## 2021-11-03 RX ADMIN — CARVEDILOL PHOSPHATE 25 MILLIGRAM(S): 80 CAPSULE, EXTENDED RELEASE ORAL at 05:37

## 2021-11-03 RX ADMIN — SENNA PLUS 2 TABLET(S): 8.6 TABLET ORAL at 21:18

## 2021-11-03 RX ADMIN — Medication 3 UNIT(S): at 12:08

## 2021-11-03 RX ADMIN — Medication 1: at 08:32

## 2021-11-03 RX ADMIN — Medication 1 APPLICATION(S): at 18:05

## 2021-11-03 RX ADMIN — CARVEDILOL PHOSPHATE 25 MILLIGRAM(S): 80 CAPSULE, EXTENDED RELEASE ORAL at 18:01

## 2021-11-03 RX ADMIN — SODIUM CHLORIDE 80 MILLILITER(S): 9 INJECTION INTRAMUSCULAR; INTRAVENOUS; SUBCUTANEOUS at 08:38

## 2021-11-03 RX ADMIN — INSULIN GLARGINE 10 UNIT(S): 100 INJECTION, SOLUTION SUBCUTANEOUS at 21:18

## 2021-11-03 RX ADMIN — Medication 2: at 12:08

## 2021-11-03 RX ADMIN — Medication 3 UNIT(S): at 08:29

## 2021-11-03 RX ADMIN — PANTOPRAZOLE SODIUM 40 MILLIGRAM(S): 20 TABLET, DELAYED RELEASE ORAL at 05:38

## 2021-11-03 RX ADMIN — Medication 250 MILLIGRAM(S): at 12:15

## 2021-11-03 RX ADMIN — AMLODIPINE BESYLATE 10 MILLIGRAM(S): 2.5 TABLET ORAL at 05:37

## 2021-11-03 RX ADMIN — HEPARIN SODIUM 5000 UNIT(S): 5000 INJECTION INTRAVENOUS; SUBCUTANEOUS at 16:08

## 2021-11-03 RX ADMIN — Medication 81 MILLIGRAM(S): at 12:14

## 2021-11-03 RX ADMIN — HEPARIN SODIUM 5000 UNIT(S): 5000 INJECTION INTRAVENOUS; SUBCUTANEOUS at 21:17

## 2021-11-03 RX ADMIN — Medication 325 MILLIGRAM(S): at 12:14

## 2021-11-03 RX ADMIN — HEPARIN SODIUM 5000 UNIT(S): 5000 INJECTION INTRAVENOUS; SUBCUTANEOUS at 05:38

## 2021-11-03 RX ADMIN — Medication 1 APPLICATION(S): at 05:38

## 2021-11-03 NOTE — PROGRESS NOTE ADULT - PROBLEM SELECTOR PROBLEM 8
Prophylactic measure
Discharge planning issues
Prophylactic measure
Prophylactic measure
Discharge planning issues
Discharge planning issues
Prophylactic measure

## 2021-11-03 NOTE — PROGRESS NOTE ADULT - PROBLEM SELECTOR PLAN 3
- Continue amlodipine 10mg po daily   - Continue Coreg 25 mg po q12   - Hold home lasix 20mg po daily for now in the setting of CARRIE   - Continue to monitor BP
- Continue amlodipine 10mg po daily   - Continue Coreg 25 mg po q12   - Hold home lasix 20mg po daily for now in the setting of ACRRIE   - Continue to monitor BP
- Continue amlodipine 10mg po daily   - Continue Coreg 25 mg po q12   - Hold home lasix 20mg po daily for now in the setting of CARRIE   - Continue to monitor BP
SCr 1.82  Avoid nephrotoxins  Follow up BMP in AM
SCr 1.82  Avoid nephrotoxins  Follow up BMP in AM
- Continue amlodipine 10mg po daily   - Continue Coreg 25 mg po q12   - Hold home lasix 20mg po daily for now in the setting of CARRIE   - Continue to monitor BP
SCr 1.85  Avoid nephrotoxins  Follow up BMP in AM
- Continue amlodipine 10mg po daily   - Continue Coreg 25 mg po q12   - Hold home lasix 20mg po daily for now in the setting of CARRIE   - Continue to monitor BP
- Continue amlodipine 10mg po daily   - Continue Coreg 25 mg po q12   - Hold home lasix 20mg po daily for now in the setting of CARRIE   - Continue to monitor BP

## 2021-11-03 NOTE — PROGRESS NOTE ADULT - PROBLEM SELECTOR PROBLEM 3
Hypertension
CARRIE (acute kidney injury)
Hypertension
CARRIE (acute kidney injury)
CARRIE (acute kidney injury)
Hypertension
Hypertension

## 2021-11-03 NOTE — PROGRESS NOTE ADULT - PROBLEM SELECTOR PROBLEM 7
Prophylactic measure
Obesity
Prophylactic measure
Obesity
Prophylactic measure
Obesity

## 2021-11-03 NOTE — CHART NOTE - NSCHARTNOTEFT_GEN_A_CORE
As per podiatry pt. may discharge with Dakins dressings, does not need wound vac at this time.  Appointment made for pt. at the podiatry clinic [facility] 11/17-10:15 am.  Pt. is medically optimized for discharge to [facility].

## 2021-11-03 NOTE — PROGRESS NOTE ADULT - ASSESSMENT
A:   s/p L heel wound debridement with somagen gratf application and bone biopsy 10/26/21  Left diabetic infracalcaneal ulcer  Diabetes Type 2 with peripheral neuropathy    P:   pt seen and evaluated  s/p left foot wound debridement with graft application, bone biopsy (10/27)  Culture of the wound shows MRSA prelim  Bone culture reviewed - no growth   Bone pathology- no OM   Verbal consent was obtained. Staples were removed. Pt tolerated the procedure well. Foot and ankle were cleaned with dakins. Dakins soaked gauze applied to heel wound, DSD/ACE   Pt stable from podiatry  Upon discharge please keep dressing clean, dry and intact  Pt to be nonwb to Left heel, pt can use knee scooter to offload heel   Recommend elevation of the left leg to help aid in post operative swelling   Appreciate ID consult  WCO: Applied dakins to 4x4 gauze, secure with sherrie, apply ACE. Pt does not need wound vac. Pt can leave without wound vac to [facility]  Pts NP has made f/u appointment for pt f/u visit to podiatry clinic   Follow up in outpatient podiatry clinic at [address] [place] [address] on Tuesday wed, or Thursday   Discussed with and evaluated bedside with attending Dr. Burton  A:   s/p L heel wound debridement with somagen gratf application and bone biopsy 10/26/21  Left diabetic infracalcaneal ulcer  Diabetes Type 2 with peripheral neuropathy    P:   pt seen and evaluated  s/p left foot wound debridement with graft application, bone biopsy (10/27)  Culture of the wound shows MRSA prelim  Bone culture reviewed - no growth   Bone pathology- no OM   Verbal consent was obtained. Staples were removed. Pt tolerated the procedure well. Foot and ankle were cleaned with dakins. Dakins soaked gauze applied to heel wound, DSD/ACE   Pt stable from podiatry  Upon discharge please keep dressing clean, dry and intact  Pt to be nonwb to Left heel, pt can use knee scooter to offload heel   Recommend elevation of the left leg to help aid in post operative swelling   Appreciate ID consult  WCO: Applied dakins to 4x4 gauze, secure with sherrie, apply ACE. Pt does not need wound vac. Pt can leave without wound vac to [facility]  Pts NP has made f/u appointment for pt f/u visit to podiatry clinic   Follow up in outpatient podiatry clinic at [address] [place] [address] on Tuesday wed, or Thursday   Discussed with and evaluated bedside with attending Dr. Magana

## 2021-11-03 NOTE — PROGRESS NOTE ADULT - PROBLEM SELECTOR PROBLEM 1
Diabetic foot ulcer

## 2021-11-03 NOTE — PROGRESS NOTE ADULT - PROBLEM SELECTOR PROBLEM 4
CARRIE (acute kidney injury)
Anemia
CARRIE (acute kidney injury)
CARRIE (acute kidney injury)
Anemia
CARRIE (acute kidney injury)
CARRIE (acute kidney injury)
Anemia
CARRIE (acute kidney injury)

## 2021-11-03 NOTE — PROGRESS NOTE ADULT - SUBJECTIVE AND OBJECTIVE BOX
NP Note discussed with  Primary Attending    INTERVAL HPI/OVERNIGHT EVENTS: no new complaints    MEDICATIONS  (STANDING):  amLODIPine   Tablet 10 milliGRAM(s) Oral daily  aspirin  chewable 81 milliGRAM(s) Oral daily  carvedilol 25 milliGRAM(s) Oral every 12 hours  Dakins Solution - Full Strength 1 Application(s) Topical two times a day  ferrous    sulfate 325 milliGRAM(s) Oral daily  heparin   Injectable 5000 Unit(s) SubCutaneous every 8 hours  insulin glargine Injectable (LANTUS) 10 Unit(s) SubCutaneous at bedtime  insulin lispro (ADMELOG) corrective regimen sliding scale   SubCutaneous three times a day before meals  insulin lispro (ADMELOG) corrective regimen sliding scale   SubCutaneous at bedtime  insulin lispro Injectable (ADMELOG) 3 Unit(s) SubCutaneous three times a day before meals  pantoprazole    Tablet 40 milliGRAM(s) Oral before breakfast  senna 2 Tablet(s) Oral at bedtime  sodium chloride 0.9%. 1000 milliLiter(s) (80 mL/Hr) IV Continuous <Continuous>  vancomycin  IVPB 1000 milliGRAM(s) IV Intermittent daily    MEDICATIONS  (PRN):    __________________________________________________  Vital Signs Last 24 Hrs  T(C): 36.9 (11-03-21 @ 21:30), Max: 37.1 (11-03-21 @ 05:46)  T(F): 98.4 (11-03-21 @ 21:30), Max: 98.8 (11-03-21 @ 05:46)  HR: 70 (11-03-21 @ 21:30) (58 - 70)  BP: 154/62 (11-03-21 @ 21:30) (129/55 - 154/62)  BP(mean): --  RR: 18 (11-03-21 @ 21:30) (17 - 18)  SpO2: 99% (11-03-21 @ 21:30) (98% - 99%)    ________________________________________________  PHYSICAL EXAM:  GENERAL: NAD  HEENT: Normocephalic;  conjunctivae and sclerae clear; moist mucous membranes;   NECK : supple  CHEST/LUNG: dec breath sounds at bases  HEART: S1 S2  regular; no murmurs, gallops or rubs  ABDOMEN: Soft, Nontender, Nondistended; Bowel sounds present  EXTREMITIES: no cyanosis; no edema; no calf tenderness  SKIN: warm and dry; no rash, left foot dressing c/d/i  NERVOUS SYSTEM:  Awake and alert;   _________________________________________________  LABS:           LABS:  11-02    141  |  110<H>  |  43<H>  ----------------------------<  169<H>  4.5   |  26  |  1.89<H>    Ca    8.9      02 Nov 2021 05:54      Creatinine Trend: 1.89 <--, 2.32 <--, 1.93 <--, 1.68 <--, 1.73 <--                        7.9    9.24  )-----------( 381      ( 02 Nov 2021 05:54 )             23.8     Urine Studies:

## 2021-11-03 NOTE — PROGRESS NOTE ADULT - ATTENDING COMMENTS
Seen at bedside.  Plan for OR today at 12 for debridement, bone biopsy and graft.  Risks and benefits addressed. Continue to medically optimize for procedure.
Seen at bedside.  d/c wound vac.  Stable for d/c to [facility].  will follow at outpatient
discussed management plan with acp covering

## 2021-11-03 NOTE — PROGRESS NOTE ADULT - ASSESSMENT
49 y/o M with Pmhx  of Diabetes, HTN (not on meds), obesity, PSH of Left foot 5th digit amputation, who presented to the ED with complaints of chronic left heel ulcer for 2-3 months. MRI revealed possible OM ; followed by Podiatry and s/p debridement 10/27 with bone biopsy and graft placement . Cultures positive for MRSA; started on vancomycin and ID consulted& following.   Surgical path from 10/27 negative for OM.    Pt. was recommended [facility], choices were given by CM.  d/w ID path findings, recc to transition to PO Doxy 100mg PO BID x 1 week.  Pt. with wound vac, removed by podiatrist 11/1, wound vac ordered by CM. clear for discharge from podiatry stand point. Awaiting auth.

## 2021-11-03 NOTE — PROGRESS NOTE ADULT - PROBLEM/PLAN-5
DISPLAY PLAN FREE TEXT
WDL
DISPLAY PLAN FREE TEXT

## 2021-11-03 NOTE — PROGRESS NOTE ADULT - PROBLEM SELECTOR PROBLEM 2
MRSA cellulitis
Hypertensive emergency
Hypertension
MRSA cellulitis
MRSA cellulitis
Hypertension

## 2021-11-03 NOTE — DISCHARGE NOTE NURSING/CASE MANAGEMENT/SOCIAL WORK - PATIENT PORTAL LINK FT
You can access the FollowMyHealth Patient Portal offered by [facility] by registering at the following website: [email or web address]. By joining [facility]’s FollowMyHealth portal, you will also be able to view your health information using other applications (apps) compatible with our system.

## 2021-11-03 NOTE — PROGRESS NOTE ADULT - PROVIDER SPECIALTY LIST ADULT
Internal Medicine
Internal Medicine
Infectious Disease
Infectious Disease
Podiatry
Infectious Disease
Podiatry
Internal Medicine

## 2021-11-03 NOTE — PROGRESS NOTE ADULT - REASON FOR ADMISSION
Diabetic foot ulcer

## 2021-11-03 NOTE — PROGRESS NOTE ADULT - SUBJECTIVE AND OBJECTIVE BOX
Podiatry Interval HPI: Pt seen bedside resting comfortably s/p left foot wound debridement with graft application (10/27). Pt denies any pain to the foot.  States he is not ambulating to left foot. States he is waiting for rehab placement options. Denies constitutional symptoms. No other pedal complaints.     Podiatry HPI: 55 y/o diabetic male presents to the emergency department for a left heel wound. Patient states he was told to come to the ED by his podiatrist, Dr. Woodard. Patient states he has had the heel ulcer for 2 months. Patient states that the wound has progressively worsened in the last two months. Patient denies any pain to the wound. Patient states he has been following up with his podiatrist regularly for the past 8 months. Patient states he had a toe removed on his left foot several months ago. Patient admits he has diabetic neuropathy. Patient admits his recent blood sugar test was above 200. Patient denies smoking or drinking. Patient denies constitutional symptoms, denies trauma. No further pedal complaints.    HPI: Pt is a 49 y/o M with PMH of Diabetes (on insulin), Htn (says not on meds, but pharmacy states amlodipine, and carvedilol), anemia, obesity, PSH of Left foot 5th digit amputation, who presented to the ED with complaints of chronic left heel ulcer for 2-3 months. Patient states it has not been healing well. He has been seeing a podiatrist Dr. Ruffin, who sent him to the hospital for further evaluation. Pt says that he has sometimes seen some purple discharge from the wound, he denies any fevers or chills. He also denies any limited range of motion, difficulty with ambulation, pain at the site, or at the ankle joint. He denies any other complaints.     Upon further questioning after lab review. Patient endorses that his PCP told him to see a nephrologist for his kidney but he hadn't had the opportunity to go. He also knew about anemia and endorses some episodes of bleeding both painless BRBPR and black tarry stools around 8 months ago. Has never had a colonoscopy or endoscopy. Patient denies ever having a echocardiogram, or being told he has heart failure.  (25 Oct 2021 04:29)    Medications amLODIPine   Tablet 10 milliGRAM(s) Oral daily  aspirin  chewable 81 milliGRAM(s) Oral daily  carvedilol 25 milliGRAM(s) Oral every 12 hours  Dakins Solution - Full Strength 1 Application(s) Topical two times a day  ferrous    sulfate 325 milliGRAM(s) Oral daily  heparin   Injectable 5000 Unit(s) SubCutaneous every 8 hours  insulin glargine Injectable (LANTUS) 10 Unit(s) SubCutaneous at bedtime  insulin lispro (ADMELOG) corrective regimen sliding scale   SubCutaneous three times a day before meals  insulin lispro (ADMELOG) corrective regimen sliding scale   SubCutaneous at bedtime  insulin lispro Injectable (ADMELOG) 3 Unit(s) SubCutaneous three times a day before meals  pantoprazole    Tablet 40 milliGRAM(s) Oral before breakfast  senna 2 Tablet(s) Oral at bedtime  sodium chloride 0.9%. 1000 milliLiter(s) IV Continuous <Continuous>  vancomycin  IVPB 1000 milliGRAM(s) IV Intermittent daily    FHNo pertinent family history in first degree relatives    No pertinent family history in first degree relatives    ,   PMHDiabetes    No pertinent past medical history    Diabetes mellitus    Hypertension    Diabetic foot ulcer    Obesity       PSHNo significant past surgical history    History of amputation of toe        Labs                          7.9    9.24  )-----------( 381      ( 02 Nov 2021 05:54 )             23.8      11-02    141  |  110<H>  |  43<H>  ----------------------------<  169<H>  4.5   |  26  |  1.89<H>    Ca    8.9      02 Nov 2021 05:54       Vital Signs Last 24 Hrs  T(C): 37.1 (03 Nov 2021 05:46), Max: 37.1 (03 Nov 2021 05:46)  T(F): 98.8 (03 Nov 2021 05:46), Max: 98.8 (03 Nov 2021 05:46)  HR: 58 (03 Nov 2021 05:46) (58 - 73)  BP: 129/55 (03 Nov 2021 05:46) (129/55 - 153/62)  BP(mean): --  RR: 17 (03 Nov 2021 05:46) (17 - 18)  SpO2: 99% (03 Nov 2021 05:46) (97% - 99%)  Sedimentation Rate, Erythrocyte: 104 mm/Hr (10-25-21 @ 19:38)  Sedimentation Rate, Erythrocyte: 104 mm/Hr (10-25-21 @ 13:07)         C-Reactive Protein, Serum: 66 mg/L (10-26-21 @ 02:58)  C-Reactive Protein, Serum: 75 mg/L (10-26-21 @ 01:59)       PHYSICAL EXAM  GEN: MALU COURTNEY is a pleasant well-nourished, well developed 56y Male in no acute distress, alert awake, and oriented to person, place and time.   LE Focused:  Patient presents with a surgical shoe on his left foot and normal shoegear on the right. Patient is unassisted and unaccompanied. Patient is AOx3 and NAD.    Vasc:  DP pulses non-palpable b/l. PT pulses non-palpable b/l. Dopler exam revealed biphasic DP and PT pulses b/l. Skin temperature was warm to warm to the LLE and warm to cool to the RLE. Generalized non-pitting edema appreciated to the LLE. CFT <3 seconds to the b/l distal hallux.  Derm: Left infracalcaneal wound noted with somagen graft intact and well adhered with staples. Wound measured 6.0 cm x 5.0 cm x 1.0 cm. Malodor appreciated. No drainage. No tunneling. Fibrogranular wound base with normal periwound. Suture noted to L lateral calcaneus. No streaking or erythema noted to LLE. Cicatrix noted to left 5th ray. Fissure noted to left distal hallux and posterior heels b/l.  11/1: Graft intact, staples intact, odor noted. No clinical signs if infection noted   11/3: malodor present, graft intact, yellow drainage noted   Neuro: Light touch sensation absent b/l. Protective sensation grossly diminished b/l.  MSK: Muscle strength 5/5 for all pedal muscle groups b/l. No significant symptomatic limitations upon pedal joint ROM exams b/l. Patient had excessive amount of ankle joint dorsiflexion b/l. Left 5th ray amputation noted.      Imaging:    EXAM:  MR FOOT LT                          PROCEDURE DATE:  10/26/2021      INTERPRETATION:  EXAMINATION: MRI of the left ankle/hindfoot without contrast    CLINICAL INFORMATION: Left foot ulcer. Evaluate for osteomyelitis.    TECHNIQUE: Multiplanar, multisequential MR imaging was performed.    FINDINGS: There is a cutaneous wound along the plantar aspect of the calcaneus with adjacent skin thickening and confluent subcutaneous soft tissue infiltration that may be secondary to cellulitis/phlegmon and/or ill-defined scar tissue. There is nonspecific high T2 signal throughout the adjacent lateral half of the calcaneus without associated bony destruction or confluent low T1 marrow signal. The findings are nonspecific and may be secondaryto stress reaction and/or osteomyelitis.    There is also edema signal throughout the talus talus, likely stress reaction, although, osteomyelitis cannot entirely excluded. There appears to be a small loose body in the anterior tibiotalar recess. Patient is status post transmetatarsal amputation of the fifth ray. There is a small to moderate-sized tibiotalar joint effusion. 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    There is diffuse fatty atrophy and high T2 signal of musculature, consistent with denervation. There is marked thickening and poor definition of the central cord of the plantar fascia with adjacent bony productive changes. There is also severe thickening/tendinosis of the noninsertional portion ofthe partially imaged Achilles tendon.    There is diffuse subcutaneous soft tissue edema.    IMPRESSION: Cutaneous wound along the plantar aspect of the calcaneus with adjacent confluent subcutaneous soft tissue infiltration that may be secondary to phlegmon/infection and/or scarring. Correlate clinically.  Marrow edema signal throughout the adjacent lateral aspect of the calcaneus may be secondary to stress reaction and/or osteomyelitis.  Edema in the talus is favored to be secondary to stress reaction.  Severe insertional Achilles tendinosis.  Marked thickening and poor definition of the central cord of plantar fascia with adjacent bony productive changes.      EXAM:  XR FOOT COMP MIN 3 VIEWS LT                          PROCEDURE DATE:  10/25/2021      INTERPRETATION:  LEFT foot    CLINICAL INFORMATION: LEFT heel ulcer. Assess osteomyelitis.    TECHNIQUE: AP,lateral and oblique views.    FINDINGS:  Large  heel soft tissue ulceration.. Adjacent calcaneus shows no gross osteolysis..  There is diffuse soft tissue swelling.  Status post fifth transmetatarsal amputation.  Degenerative arthrosis of the phalanges.    IMPRESSION:    Large heel soft tissue ulceration..  No radiographic evidence of osteomyelitis.    If osteomyelitis is considered  despite conservative therapy, and soft tissue / bone infection requires further assessment, follow-up MRI recommended.      Cultures:   Specimen Source: .Surgical Swab left foot wound (10.25.21 @ 20:57) Culture Results:   Culture yields >4 types of aerobic and/or anaerobic bacteria   Call client services within 7 days if further workup is clinically   indicated. Culture includes   Few Methicillin Resistant Staphylococcus aureus (10.25.21 @ 20:57)     Specimen Source: .Tissue Other, Left calcaneus bone biopsy (10.27.21 @ 20:58) Gram Stain:     Bone pathology 10/31  No polymorphonuclear cells seen per low power field   No organisms seen per oil power field (10.27.21 @ 20:58)   1 Bone biopsy left calcaneus   Final Diagnosis   Bone, left calcaneus; biopsy:   - Remodeling bone with focal marrow fibrosis and chronic inflammation.   - Unremarkable periosteal tissue and fibrocartilage with degenerative   changes.   - There is no evidence of acute osteomyelitis.

## 2021-11-03 NOTE — PROGRESS NOTE ADULT - PROBLEM SELECTOR PLAN 6
Follow up nutrition consult  Continue with supportive care
A1C 8.5 (Pt takes Tresiba and Humalog at home)  - Blood glucose within acceptable limits on current inpatient regimen.  -Continue Lantus 10mg SQ  QHS  - Continue premeal amelog 3 units  -Continue Admelog sliding scale insulin coverage  -Continue blood  glucose monitoring QAC and QHS  -Continue consistent  carb diet
Seen by Nutrition with education provided.   Continue with supportive care
A1C 8.5 (Pt takes Tresiba and Humalog at home)  - Blood glucose within acceptable limits on current inpatient regimen.  -Continue Lantus 10mg SQ  QHS  - Continue premeal amelog 3 units  -Continue Admelog sliding scale insulin coverage  -Continue blood  glucose monitoring QAC and QHS  -Continue consistent  carb diet
Follow up nutrition consult  Continue with supportive care

## 2021-11-03 NOTE — PROGRESS NOTE ADULT - PROBLEM SELECTOR PLAN 7
DVT PPX: Heparin  GI PPX: PPI
Seen by Nutrition 10/28 with education provided.   Continue with supportive care
DVT PPX: Heparin  GI PPX: PPI
DVT PPX: Heparin  GI PPX: PPI
Seen by Nutrition 10/28 with education provided.   Continue with supportive care

## 2021-11-17 ENCOUNTER — OUTPATIENT (OUTPATIENT)
Dept: OUTPATIENT SERVICES | Facility: HOSPITAL | Age: 56
LOS: 1 days | End: 2021-11-17
Payer: MEDICAID

## 2021-11-17 ENCOUNTER — APPOINTMENT (OUTPATIENT)
Dept: WOUND CARE | Facility: CLINIC | Age: 56
End: 2021-11-17

## 2021-11-17 VITALS
HEIGHT: 75 IN | SYSTOLIC BLOOD PRESSURE: 135 MMHG | TEMPERATURE: 97.2 F | RESPIRATION RATE: 18 BRPM | WEIGHT: 315 LBS | DIASTOLIC BLOOD PRESSURE: 69 MMHG | OXYGEN SATURATION: 97 % | BODY MASS INDEX: 39.17 KG/M2 | HEART RATE: 69 BPM

## 2021-11-17 DIAGNOSIS — I10 ESSENTIAL (PRIMARY) HYPERTENSION: ICD-10-CM

## 2021-11-17 DIAGNOSIS — Z89.429 ACQUIRED ABSENCE OF OTHER TOE(S), UNSPECIFIED SIDE: Chronic | ICD-10-CM

## 2021-11-17 DIAGNOSIS — Z00.00 ENCOUNTER FOR GENERAL ADULT MEDICAL EXAMINATION WITHOUT ABNORMAL FINDINGS: ICD-10-CM

## 2021-11-17 PROCEDURE — G0463: CPT

## 2021-11-17 PROCEDURE — 11042 DBRDMT SUBQ TIS 1ST 20SQCM/<: CPT

## 2021-11-17 RX ORDER — CARVEDILOL 25 MG/1
25 TABLET, FILM COATED ORAL
Refills: 0 | Status: ACTIVE | COMMUNITY

## 2021-11-17 RX ORDER — AMLODIPINE BESYLATE 5 MG/1
TABLET ORAL
Refills: 0 | Status: ACTIVE | COMMUNITY

## 2021-11-17 RX ORDER — FERROUS GLUCONATE 324(38)MG
324 (38 FE) TABLET ORAL
Refills: 0 | Status: ACTIVE | COMMUNITY

## 2021-11-17 RX ORDER — ACETAMINOPHEN 325 MG/1
TABLET, FILM COATED ORAL
Refills: 0 | Status: ACTIVE | COMMUNITY

## 2021-11-17 RX ORDER — INSULIN LISPRO 100 U/ML
100 INJECTION, SOLUTION INTRAVENOUS; SUBCUTANEOUS
Refills: 0 | Status: ACTIVE | COMMUNITY

## 2021-11-17 RX ORDER — DOXYCYCLINE HYCLATE 100 MG/1
100 CAPSULE ORAL
Refills: 0 | Status: ACTIVE | COMMUNITY

## 2021-11-17 RX ORDER — FUROSEMIDE 20 MG/1
20 TABLET ORAL
Refills: 0 | Status: ACTIVE | COMMUNITY

## 2021-11-17 RX ORDER — ASPIRIN 325 MG/1
TABLET, FILM COATED ORAL
Refills: 0 | Status: ACTIVE | COMMUNITY

## 2021-11-17 RX ORDER — INSULIN DEGLUDEC INJECTION 100 U/ML
INJECTION, SOLUTION SUBCUTANEOUS
Refills: 0 | Status: ACTIVE | COMMUNITY

## 2021-11-17 NOTE — ASSESSMENT
[FreeTextEntry1] : LE Focused:  Patient presents with a surgical shoe on his left foot and normal shoegear on the right. he is acompanied by helper and using a wheelchair. \par \par Vasc:  DP pulses non-palpable b/l. PT pulses non-palpable b/l. Dopler exam revealed biphasic DP and PT pulses b/l. Skin temperature was warm to warm to the LLE and warm to cool to the RLE. Generalized non-pitting edema appreciated to the LLE. \par Derm: Left infracalcaneal wound noted no graft present, one sutre remains. Wound measured 4.3 cm x 5.1cm x 1.0 cm with undermining. no Malodor appreciated. minimal drainage. No tunneling. 90% granular wound base wound base with macerated periwound. no erythema or signs of infection. Cicatrix noted to left 5th ray. Fissure noted to left distal hallux and posterior heels b/l.\par Neuro: Light touch sensation absent b/l. \par MSK: Muscle strength 5/5 for all pedal muscle groups b/l. No significant symptomatic limitations upon pedal joint ROM exams b/l. Patient had excessive amount of ankle joint dorsiflexion b/l. Left 5th ray amputation noted.\par \par \par A: \par left heel ulcer \par \par P:\par Patient seen and evaluated\par scrubbed foot with chlohexadine scrub \par hospital records reviewed\par excisional debridement with curette down to and including subcutaneous layer until healthy bleeding achieved \par removed suture with 11 blade without incident \par dressed foot with betadine DSD \par c/w surgical shoe\par c.w NWB to LLE\par RTC 1 week

## 2021-11-17 NOTE — HISTORY OF PRESENT ILLNESS
[FreeTextEntry1] : PAtient presents to clinic for a first time visit after been seen at [facility] as an inpatient. Patient has DM and underwent debridement and somagen graft application 10/27 for his left heel ulcer. Patient is currently in [facility] and states dressing is being changed every other day. He states there is a lot of drainage. He states that he has no pain. Denies nausea, vomitting, sob, cp, fever, chills

## 2021-11-18 DIAGNOSIS — L97.422 NON-PRESSURE CHRONIC ULCER OF LEFT HEEL AND MIDFOOT WITH FAT LAYER EXPOSED: ICD-10-CM

## 2022-03-16 NOTE — PRE-OP CHECKLIST - HEIGHT IN CM
PRE-OP DATA and Check List - GI:  Procedure: Colonoscopy (40228)   Diagnosis: COLON CANCER SCREENING  Tentative Date: Routine (next available or patient preference)  Tentative Time: To be determined  Duration of Procedure: 30 min  Anesthesia: MAC    Pre-Op Needed: no    Do not take vitamins/herbal supplements for 7 days prior to procedure. This includes omega-3's, fish oils, and iron supplements.    Patient should hold NSAID such as ibuprofen, aleve, advil, meloxicam, naproxen, aspirin, etc for 3 days prior to procedure. It is okay to use tylenol during this time.      
190.5

## 2022-10-24 ENCOUNTER — APPOINTMENT (OUTPATIENT)
Dept: OPHTHALMOLOGY | Facility: CLINIC | Age: 57
End: 2022-10-24
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
